# Patient Record
Sex: FEMALE | Race: OTHER | HISPANIC OR LATINO | Employment: OTHER | URBAN - METROPOLITAN AREA
[De-identification: names, ages, dates, MRNs, and addresses within clinical notes are randomized per-mention and may not be internally consistent; named-entity substitution may affect disease eponyms.]

---

## 2017-01-04 ENCOUNTER — HOSPITAL ENCOUNTER (OUTPATIENT)
Dept: CT IMAGING | Facility: HOSPITAL | Age: 62
Discharge: HOME | End: 2017-01-04
Payer: COMMERCIAL

## 2017-01-04 ENCOUNTER — GENERIC CONVERSION - ENCOUNTER (OUTPATIENT)
Dept: OTHER | Facility: OTHER | Age: 62
End: 2017-01-04

## 2017-01-04 DIAGNOSIS — R91.1 COIN LESION: ICD-10-CM

## 2017-01-04 DIAGNOSIS — R05.9 COUGH: ICD-10-CM

## 2017-01-04 PROCEDURE — 71250 CT THORAX DX C-: CPT

## 2017-01-16 ENCOUNTER — TRANSCRIBE ORDERS (OUTPATIENT)
Dept: ADMINISTRATIVE | Facility: HOSPITAL | Age: 62
End: 2017-01-16

## 2017-01-16 ENCOUNTER — ALLSCRIPTS OFFICE VISIT (OUTPATIENT)
Dept: OTHER | Facility: OTHER | Age: 62
End: 2017-01-16

## 2017-01-16 DIAGNOSIS — R05.9 COUGH: Primary | ICD-10-CM

## 2017-01-16 DIAGNOSIS — R93.89 ABNORMAL RADIOLOGICAL FINDINGS IN SKIN AND SUBCUTANEOUS TISSUE: ICD-10-CM

## 2017-01-18 ENCOUNTER — GENERIC CONVERSION - ENCOUNTER (OUTPATIENT)
Dept: OTHER | Facility: OTHER | Age: 62
End: 2017-01-18

## 2017-01-18 ENCOUNTER — HOSPITAL ENCOUNTER (OUTPATIENT)
Dept: PULMONOLOGY | Facility: HOSPITAL | Age: 62
Discharge: HOME | End: 2017-01-18
Payer: COMMERCIAL

## 2017-01-18 DIAGNOSIS — R93.89 ABNORMAL RADIOLOGICAL FINDINGS IN SKIN AND SUBCUTANEOUS TISSUE: ICD-10-CM

## 2017-01-18 DIAGNOSIS — R05.9 COUGH: ICD-10-CM

## 2017-01-18 PROCEDURE — 94729 DIFFUSING CAPACITY: CPT

## 2017-01-18 PROCEDURE — 94760 N-INVAS EAR/PLS OXIMETRY 1: CPT

## 2017-01-18 PROCEDURE — 94060 EVALUATION OF WHEEZING: CPT

## 2017-01-18 PROCEDURE — 94726 PLETHYSMOGRAPHY LUNG VOLUMES: CPT

## 2017-01-18 RX ORDER — SODIUM CHLORIDE FOR INHALATION 0.9 %
VIAL, NEBULIZER (ML) INHALATION
Status: DISCONTINUED
Start: 2017-01-18 | End: 2017-01-22 | Stop reason: HOSPADM

## 2017-01-31 ENCOUNTER — ALLSCRIPTS OFFICE VISIT (OUTPATIENT)
Dept: OTHER | Facility: OTHER | Age: 62
End: 2017-01-31

## 2017-02-03 ENCOUNTER — GENERIC CONVERSION - ENCOUNTER (OUTPATIENT)
Dept: OTHER | Facility: OTHER | Age: 62
End: 2017-02-03

## 2017-02-07 ENCOUNTER — ANESTHESIA EVENT (OUTPATIENT)
Dept: GASTROENTEROLOGY | Facility: HOSPITAL | Age: 62
End: 2017-02-07
Payer: COMMERCIAL

## 2017-02-08 ENCOUNTER — DOCUMENTATION (OUTPATIENT)
Dept: RESPIRATORY THERAPY | Facility: HOSPITAL | Age: 62
End: 2017-02-08

## 2017-02-08 ENCOUNTER — HOSPITAL ENCOUNTER (OUTPATIENT)
Facility: HOSPITAL | Age: 62
Setting detail: OUTPATIENT SURGERY
Discharge: HOME | End: 2017-02-08
Attending: INTERNAL MEDICINE | Admitting: INTERNAL MEDICINE
Payer: COMMERCIAL

## 2017-02-08 ENCOUNTER — ANESTHESIA (OUTPATIENT)
Dept: GASTROENTEROLOGY | Facility: HOSPITAL | Age: 62
End: 2017-02-08
Payer: COMMERCIAL

## 2017-02-08 VITALS
DIASTOLIC BLOOD PRESSURE: 68 MMHG | HEIGHT: 65 IN | TEMPERATURE: 97.8 F | BODY MASS INDEX: 37.15 KG/M2 | OXYGEN SATURATION: 98 % | RESPIRATION RATE: 20 BRPM | WEIGHT: 223 LBS | HEART RATE: 89 BPM | SYSTOLIC BLOOD PRESSURE: 129 MMHG

## 2017-02-08 DIAGNOSIS — R93.89 ABNORMAL CAT SCAN: ICD-10-CM

## 2017-02-08 LAB
EOSINOPHIL NFR FLD MANUAL: 1 %
LYMPHOCYTES NFR BLD AUTO: 9 %
MONO+MESO NFR FLD MANUAL: 80 %
MONOCYTES NFR BLD AUTO: 3 %
NEUTS SEG NFR BLD AUTO: 7 %
TOTAL CELLS COUNTED SPEC: 100
WBC # FLD MANUAL: 63 /UL

## 2017-02-08 PROCEDURE — 87075 CULTR BACTERIA EXCEPT BLOOD: CPT | Performed by: INTERNAL MEDICINE

## 2017-02-08 PROCEDURE — 87206 SMEAR FLUORESCENT/ACID STAI: CPT | Performed by: INTERNAL MEDICINE

## 2017-02-08 PROCEDURE — 87205 SMEAR GRAM STAIN: CPT | Performed by: INTERNAL MEDICINE

## 2017-02-08 PROCEDURE — 88112 CYTOPATH CELL ENHANCE TECH: CPT | Performed by: INTERNAL MEDICINE

## 2017-02-08 PROCEDURE — 87102 FUNGUS ISOLATION CULTURE: CPT | Performed by: INTERNAL MEDICINE

## 2017-02-08 PROCEDURE — 87116 MYCOBACTERIA CULTURE: CPT | Performed by: INTERNAL MEDICINE

## 2017-02-08 PROCEDURE — 87252 VIRUS INOCULATION TISSUE: CPT | Performed by: INTERNAL MEDICINE

## 2017-02-08 PROCEDURE — 88305 TISSUE EXAM BY PATHOLOGIST: CPT | Performed by: INTERNAL MEDICINE

## 2017-02-08 PROCEDURE — 89051 BODY FLUID CELL COUNT: CPT | Performed by: INTERNAL MEDICINE

## 2017-02-08 PROCEDURE — 87070 CULTURE OTHR SPECIMN AEROBIC: CPT | Performed by: INTERNAL MEDICINE

## 2017-02-08 RX ORDER — SODIUM CHLORIDE 9 MG/ML
20 INJECTION, SOLUTION INTRAVENOUS CONTINUOUS
Status: DISCONTINUED | OUTPATIENT
Start: 2017-02-08 | End: 2017-02-08 | Stop reason: HOSPADM

## 2017-02-08 RX ORDER — PROPOFOL 10 MG/ML
INJECTION, EMULSION INTRAVENOUS AS NEEDED
Status: DISCONTINUED | OUTPATIENT
Start: 2017-02-08 | End: 2017-02-08 | Stop reason: SURG

## 2017-02-08 RX ORDER — KETAMINE HYDROCHLORIDE 100 MG/ML
INJECTION, SOLUTION INTRAMUSCULAR; INTRAVENOUS AS NEEDED
Status: DISCONTINUED | OUTPATIENT
Start: 2017-02-08 | End: 2017-02-08 | Stop reason: SURG

## 2017-02-08 RX ORDER — LIDOCAINE HYDROCHLORIDE 10 MG/ML
INJECTION, SOLUTION INFILTRATION; PERINEURAL AS NEEDED
Status: DISCONTINUED | OUTPATIENT
Start: 2017-02-08 | End: 2017-02-08 | Stop reason: SURG

## 2017-02-08 RX ADMIN — PROPOFOL 100 MG: 10 INJECTION, EMULSION INTRAVENOUS at 13:33

## 2017-02-08 RX ADMIN — PROPOFOL 50 MG: 10 INJECTION, EMULSION INTRAVENOUS at 13:36

## 2017-02-08 RX ADMIN — PROPOFOL 50 MG: 10 INJECTION, EMULSION INTRAVENOUS at 13:42

## 2017-02-08 RX ADMIN — PROPOFOL 50 MG: 10 INJECTION, EMULSION INTRAVENOUS at 13:39

## 2017-02-08 RX ADMIN — SODIUM CHLORIDE 20 ML/HR: 0.9 INJECTION, SOLUTION INTRAVENOUS at 12:47

## 2017-02-08 RX ADMIN — KETAMINE HYDROCHLORIDE 25 MG: 100 INJECTION, SOLUTION INTRAMUSCULAR; INTRAVENOUS at 13:33

## 2017-02-08 RX ADMIN — PROPOFOL 50 MG: 10 INJECTION, EMULSION INTRAVENOUS at 13:45

## 2017-02-08 RX ADMIN — LIDOCAINE HYDROCHLORIDE 100 MG: 10 INJECTION, SOLUTION INFILTRATION; PERINEURAL at 13:33

## 2017-02-10 LAB
BACTERIA SPEC ANAEROBE CULT: NORMAL
BACTERIA SPEC BFLD CULT: NORMAL
GRAM STN SPEC: NORMAL

## 2017-02-17 LAB — VIRUS SPEC CULT: NORMAL

## 2017-03-02 DIAGNOSIS — R93.89 ABNORMAL CAT SCAN: Primary | ICD-10-CM

## 2017-03-13 LAB — FUNGUS SPEC CULT: NORMAL

## 2017-03-23 ENCOUNTER — TRANSCRIBE ORDERS (OUTPATIENT)
Dept: ADMINISTRATIVE | Facility: HOSPITAL | Age: 62
End: 2017-03-23

## 2017-03-23 ENCOUNTER — ALLSCRIPTS OFFICE VISIT (OUTPATIENT)
Dept: OTHER | Facility: OTHER | Age: 62
End: 2017-03-23

## 2017-03-23 DIAGNOSIS — J84.09 SIMPLE PULMONARY ALVEOLITIS (HCC): Primary | ICD-10-CM

## 2017-04-11 LAB
MYCOBACTERIUM SPEC CULT: ABNORMAL
MYCOBACTERIUM SPEC CULT: ABNORMAL
RHODAMINE-AURAMINE STN SPEC: ABNORMAL

## 2017-04-19 ENCOUNTER — GENERIC CONVERSION - ENCOUNTER (OUTPATIENT)
Dept: OTHER | Facility: OTHER | Age: 62
End: 2017-04-19

## 2017-06-26 DIAGNOSIS — J84.9 INTERSTITIAL PULMONARY DISEASE (HCC): ICD-10-CM

## 2018-01-11 NOTE — MISCELLANEOUS
Message  Mailed colono letter to patients home address  Tasked PCPs office  {Resulted order in [employer]  Updated pre-visit planning to reflect 10 year follow up  }       Active Problems    1  Encounter for colorectal cancer screening (V76 51) (Z12 11,Z12 12)   2  Lung nodule (793 11) (R91 1)   3  Obesity (278 00) (E66 9)   4  Psoriatic arthropathy (696 0) (L40 50)   5  Sinus infection (473 9) (J32 9)   6  Urinary tract infection (599 0) (N39 0)    Current Meds   1  Ciprofloxacin HCl - 250 MG Oral Tablet; Take 1 every 12 hours; Therapy: 25XVL0669 to (Last Rx:25Omg2895)  Requested for: 52MAJ2274 Ordered   2  Folic Acid 1 MG Oral Tablet; Therapy: (Recorded:64Cpt9907) to Recorded   3  Methotrexate 2 5 MG Oral Tablet; TAKE 6 TABLETS WEEKLY; Therapy: 00ZNU4176 to (Last Rx:30Mar2016) Ordered   4  Nystatin 174589 UNIT/GM External Cream; APPLY  AND RUB  IN A THIN FILM TO   AFFECTED AREAS TWICE DAILY  (AM AND PM) 1 tube per week for 2 weeks; Therapy: 74AFC6927 to (Last Rx:18Trw7362)  Requested for: 77Xqm3242 Ordered    Allergies    1  Aspirin TABS   2  Sulfa Drugs    Plan  Encounter for colorectal cancer screening    · COLONOSCOPY ; every 10 years;  Last 84HHN3030; Next 35Xae6568; Status:Active    Signatures   Electronically signed by : Meir Romero, ; Sep  1 2016  4:10PM EST                       (Author)

## 2018-01-12 NOTE — RESULT NOTES
Verified Results  * CT CHEST WO CONTRAST 69ZOZ9005 08:14AM Ingrid Reddynut     Test Name Result Flag Reference   CT CHEST WO CONTRAST (Report)     CT CHEST WITHOUT IV CONTRAST     INDICATION: History of lung nodule  Cough and chest tightness  Former smoker  COMPARISON: CT chest 5/5/2015     TECHNIQUE: CT examination of the chest was performed without intravenous contrast  Axial, sagittal and coronal reformatted images were submitted for interpretation  Coronal thick section MIP (maximal intensity projection) images were also created  This examination, like all CT scans performed in the [facility], was performed utilizing techniques to minimize radiation dose exposure, including the use of iterative reconstruction and automated exposure control  FINDINGS:     LUNGS: Mild progression of peripheral interlobular septal thickening with areas of peripheral groundglass and alveolar consolidation particularly in the right lung  Previously seen 2 mm left upper lobe nodule is no longer identified  No    endotracheal/endobronchial lesion  PLEURA: Unremarkable  HEART/GREAT VESSELS: Unremarkable for patient's age  MEDIASTINUM AND JAIME: Unremarkable  CHEST WALL AND LOWER NECK: Unremarkable  VISUALIZED STRUCTURES IN THE UPPER ABDOMEN: Unremarkable  OSSEOUS STRUCTURES: No acute fracture  No destructive osseous lesion  IMPRESSION:     Mild progression of peripheral interlobular septal thickening with areas of peripheral groundglass and alveolar consolidation particularly in the right lung  Findings are nonspecific but could be seen in the setting of chronic infection or BOOP  Previously seen 2 mm left upper lobe nodule is no longer identified  ##sigslh##sigslh       Workstation performed: [id number]     Signed by:    Carlos Gusman MD   1/4/17       Plan  Chronic cough, Lung nodule    · *1 - [facility] Physician Referral  Consult Only: the expectation is that  the referring provider will communicate back to the patient on treatment options  Evaluation and Treatment: the expectation is that the referred to provider will  communicate back to the patient on treatment options    Status: Active  Requested for:  84VDW3600  Care Summary provided  : Yes    Discussion/Summary   ct shows progression of lung process, want her  to see a lung specialist, look for  a slip

## 2018-01-12 NOTE — PROGRESS NOTES
Assessment    1  Encounter for preventive health examination (V70 0) (Z00 00)   2  Psoriatic arthropathy (696 0) (L40 50)   3  Obesity (278 00) (E66 9)   4  Encounter for screening mammogram for malignant neoplasm of breast (V76 12)   (Z12 31)   5  Screening for malignant neoplasm of cervix (V76 2) (Z12 4)   6  Encounter for colorectal cancer screening (V76 51) (Z12 11,Z12 12)    Plan  Encounter for colorectal cancer screening, Screening for malignant neoplasm of cervix    · 1 - Ingrid Monsivais MD  (General Surgery) Physician Referral  Consult  Status: Active   Requested for: 98AVN4913  Care Summary provided  : Yes  Health Maintenance    · *VB-Depression Screening; Status:Resulted - Requires Verification;   Done: 70AUO9602  11:20AM   · Follow-up visit in 1 year Evaluation and Treatment  Follow-up  Status: Hold For -  Scheduling  Requested for: 25EBD8048   · Alcohol misuse can be a problem with advancing age ; Status:Complete;   Done:  28WHE3781 11:21AM   · Always use a seat belt and shoulder strap when riding or driving a motor vehicle ;  Status:Complete;   Done: 89CEC9046 11:21AM   · Brush your teeth freq1 and floss at least once a day ; Status:Complete;   Done:  14HUR5707 11:21AM   · Decreasing the stress in your life may help your condition improve ; Status:Complete;    Done: 63TYU3563 11:21AM   · Stretch and warm up your muscles during the first 10 minutes , then cool down your  muscles for the last 10 minutes of exercise ; Status:Complete;   Done: 20VGW1850  11:21AM   · Use a sun block product with an SPF of 15 or more ; Status:Complete;   Done:  10PSV1353 11:21AM   · We recommend a colonoscopy ; Status:Complete;   Done: 38XRJ4684 11:21AM   · We recommend routine visits to a dentist ; Status:Complete;   Done: 21EAM6905 11:21AM   · Call [phone] if: You find a new or different kind of lump in your breast ;  Status:Complete;   Done: 33AIS4506 11:21AM   · Call [phone] if:  You have any bleeding from the vagina ; Status:Complete;    Done: 39OVB5585 11:21AM   · Call [phone] if: You have any warning signs of skin cancer ; Status:Complete;    Done: 23ELR2956 11:21AM   · Call 911 if: You experience a new kind of chest pain (angina) or pressure ;  Status:Complete;   Done: 54NYG8403 11:21AM   · [place] - Padmini Alexandra MD, Carolina Garibay  (Obstetrics/Gynecology) Physician Referral  Consult  Status:  Hold For - Scheduling  Requested for: 28XKH3609  Care Summary provided  : Yes   · Stop: Fluzone Quadrivalent Intramuscular Suspension   · Tdap; INJECT 0 5  ML Intramuscular; To Be Done: 54WDM8855   · Zoster (Zostavax); INJECT 0 5  ML Intramuscular; To Be Done: 48CWV2997  Health Maintenance, Encounter for screening mammogram for malignant neoplasm of  breast    · MAMMO SCREENING BILATERAL W 3D & CAD; Status:Hold For - Scheduling; Requested  for:30Mar2016; Health Maintenance, Obesity    · Diets that are low in carbohydrates and high in protein are very popular for weight loss ;  Status:Complete;   Done: 97YWQ7917 11:21AM   · Eat a low fat and low cholesterol diet ; Status:Complete;   Done: 35OQU5144 11:21AM  Obesity    · Call [phone] if: You are considering suicide ; Status:Complete;   Done:  23DAS2850 11:22AM   · Call [phone] if: You are having difficulty sleeping (insomnia) ; Status:Complete;    Done: 31SZG3768 11:22AM   · Call [phone] if: You are urinating too frequently ; Status:Complete;   Done:  74UWV6559 11:22AM   · Call [phone] if: You feel thirsty most of the time ; Status:Complete;   Done:  36QQE9456 11:22AM   · Call [phone] if: You feel your heart is beating very fast or skipping beats ;  Status:Complete;   Done: 26VIP7841 11:22AM   · Call [phone] if: You have feelings of extreme sadness and feelings of  hopelessness ; Status:Complete;   Done: 23KKP8581 11:22AM   · Call [phone] if:  You have pain in your abdomen ; Status:Complete;   Done:  29TNR8638 11:22AM   · Call [phone] if: You have symptoms of sleep apnea ; Status:Complete;   Done:  87UFE0361 11:22AM   · Call 911 if: You have sudden or severe chest pain with shortness of breath, rapid  breathing, or cough ; Status:Complete;   Done: 13BVR7735 11:22AM   · Begin or continue regular aerobic exercise  Gradually work up to at least 3 sessions of 30  minutes of exercise a week ; Status:Complete;   Done: 67GZG9059 11:22AM   · Some eating tips that can help you lose weight ; Status:Complete;   Done: 41CHS7463  11:22AM   · Stretch and warm up your muscles during the first 10 minutes , then cool down your  muscles for the last 10 minutes of exercise ; Status:Complete;   Done: 80BXX6923  11:22AM  Psoriatic arthropathy    · Methotrexate 2 5 MG Oral Tablet; TAKE 6 TABLETS WEEKLY    Discussion/Summary  health maintenance visit Currently, she eats a healthy diet and has an inadequate exercise regimen  the risks and benefits of cervical cancer screening were discussed cervical cancer screening is managed by Dr Jules Muñoz referred back Breast cancer screening: the risks and benefits of breast cancer screening were discussed, self breast exam technique was taught, monthly self breast exam was advised and mammogram has been ordered  Colorectal cancer screening: the risks and benefits of colorectal cancer screening were discussed and colonoscopy has been ordered  Osteoporosis screening: the risks and benefits of osteoporosis screening were discussed  The immunizations will be given as outlined in the orders  She was advised to be evaluated by an optometrist  Advice and education were given regarding aerobic exercise, calcium supplements, vitamin D supplements, cardiovascular risk reduction, self skin examination, helmet use and seat belt use  Patient discussion: discussed with the patient  Chief Complaint  CK UP  REVIEW BLOOD WORK      History of Present Illness  HM, Adult Female:  The patient is being seen for a health maintenance evaluation  The last health maintenance visit was 2 year(s) ago  Social History: Household members include spouse  She is   Work status: homemaker  The patient has never smoked cigarettes  She reports never drinking alcohol  She has never used illicit drugs  General Health: The patient's health since the last visit is described as good  She has regular dental visits  She denies vision problems  She denies hearing loss  Immunizations status: not up to date  Lifestyle:  She consumes a diverse and healthy diet  She has weight concerns  She does not exercise regularly  She does not use tobacco  She denies alcohol use  She denies drug use  Reproductive health: the patient is postmenopausal   she is sexually active  Screening: Cervical cancer screening includes a pap smear performed 3 yrs ago  Breast cancer screening includes a mammogram performed 2 yrs ago, a clinical breast exam performed 3 yrs ago and monthly breast self-exams performed  She hasn't been previously screened for colorectal cancer  Metabolic screening includes lipid profile performed 2 weeks ago, glucose screening performed 2 weeks ago, thyroid function test performed 2 weeks ago and no previous DEXA  Cardiovascular risk factors: obesity and family history of cardiovascular disease, but no hypertension, no diabetes, no high LDL cholesterol, no low HDL cholesterol, no stress, no tobacco use, no illicit drug use and no sedentary lifestyle  General health risks: no previous breast cancer, no abnormal cervical cytology, no positive screening for human papilloma virus, no hormone therapy, no diethylstilbestrol (NATE) exposure in utero, no previous colon polyps, no inflammatory bowel disease and no osteoporosis risk factors  Safety elements used: seat belt, safe driving habits, sunscreen and smoke detector, but no carbon monoxide detector     HPI: Patient is here for well checkup Patient is overall doing OK Patient is not up to date with immunizations and needs screening tests patient last labs were revieewed      Review of Systems    Constitutional: no fever, not feeling poorly, no chills and not feeling tired  Eyes: no eye pain and no eyesight problems  ENT: no complaints of earache, no loss of hearing, no nose bleeds, no nasal discharge, no sore throat, no hoarseness  Cardiovascular: no chest pain, no intermittent leg claudication, no palpitations and no lower extremity edema  Respiratory: No complaints of shortness of breath, no wheezing, no cough, no SOB on exertion, no orthopnea, no PND  Gastrointestinal: No complaints of abdominal pain, no constipation, no nausea or vomiting, no diarrhea, no bloody stools  Genitourinary: no dysuria and no incontinence  Musculoskeletal: no arthralgias and no myalgias  Integumentary: no rashes  Neurological: No complaints of headache, no confusion, no convulsions, no numbness, no dizziness or fainting, no tingling, no limb weakness, no difficulty walking  Psychiatric: Not suicidal, no sleep disturbance, no anxiety or depression, no change in personality, no emotional problems  Active Problems    1  Lung nodule (793 11) (R91 1)   2   Obesity (278 00) (E66 9)    Past Medical History    · History of Acute upper respiratory infection (465 9) (J06 9)   · History of Allergic rhinitis (477 9) (J30 9)   · History of Bilateral knee pain (719 46) (M25 561,M25 562)   · History of Candidiasis of breast (112 89) (B37 89)   · History of Community acquired pneumonia (486) (J18 9)   · History of abnormal weight loss (V13 89) (N99 361)   · History of atopic dermatitis (V13 3) (Z87 2)   · History of diarrhea (V12 79) (Z87 898)   · History of pneumonia (V12 61) (Z87 01)   · History of sinusitis (V12 69) (Z87 09)   · History of Pain in right foot (729 5) (M79 671)   · History of Synovitis Of Ankle/Foot (727 06)   · Urinary tract infection (599 0) (N39 0)    Surgical History    · History of Complete Colonoscopy   · History of Hernia Repair    Family History    · Family history of Hypertension (V17 49)   · Family history of Hypothyroidism    · Family history of Breast Cancer (V16 3)    · Family history of Arthritis (V17 7)   · Denied: Family history of Osteoporosis    Social History    · Denied: History of Alcohol Use (History)   · Caffeine Use   · Denied: History of Drug Use   · Never A Smoker   · Denied: History of Tobacco Use   · Uses Safety Equipment - Seatbelts    Current Meds   1  Nystatin 368142 UNIT/GM External Cream; APPLY  AND RUB  IN A THIN FILM TO   AFFECTED AREAS TWICE DAILY  (AM AND PM) 1 tube per week for 2 weeks; Therapy: 46CHO7908 to (Last Rx:53Oqf5293)  Requested for: 27Jan4122 Ordered    Allergies    1  Aspirin TABS   2  Sulfa Drugs    Vitals   Recorded: 78QVV0289 26:26HA   Systolic 710   Diastolic 78   Height 5 ft 6 in   Weight 220 lb 2 oz   BMI Calculated 35 53   BSA Calculated 2 08     Physical Exam    Constitutional   General appearance: No acute distress, well appearing and well nourished  Eyes   Conjunctiva and lids: No swelling, erythema or discharge  Pupils and irises: Equal, round, reactive to light  Ears, Nose, Mouth, and Throat   External inspection of ears and nose: Normal     Otoscopic examination: Tympanic membranes translucent with normal light reflex  Canals patent without erythema  Hearing: Normal     Nasal mucosa, septum, and turbinates: Normal without edema or erythema  Lips, teeth, and gums: Normal, good dentition  Oropharynx: Normal with no erythema, edema, exudate or lesions  Neck   Neck: Supple, symmetric, trachea midline, no masses  Thyroid: Normal, no thyromegaly  Pulmonary   Respiratory effort: No increased work of breathing or signs of respiratory distress  Auscultation of lungs: Clear to auscultation  Cardiovascular   Auscultation of heart: Normal rate and rhythm, normal S1 and S2, no murmurs  Carotid pulses: 2+ bilaterally  Abdominal aorta: Normal     Examination of extremities for edema and/or varicosities: Normal     Abdomen   Abdomen: Non-tender, no masses  Liver and spleen: No hepatomegaly or splenomegaly  Lymphatic   Palpation of lymph nodes in neck: No lymphadenopathy  Musculoskeletal   Gait and station: Normal     Skin   Skin and subcutaneous tissue: Normal without rashes or lesions  Palpation of skin and subcutaneous tissue: Normal turgor  Neurologic   Cranial nerves: Cranial nerves II-XII intact  Psychiatric   Judgment and insight: Normal     Orientation to person, place, and time: Normal     Recent and remote memory: Intact      Mood and affect: Normal        Signatures   Electronically signed by : Juan Antonio Almaraz DO; Mar 30 2016 11:25AM EST                       (Author)

## 2018-01-13 VITALS
SYSTOLIC BLOOD PRESSURE: 124 MMHG | WEIGHT: 222 LBS | DIASTOLIC BLOOD PRESSURE: 78 MMHG | BODY MASS INDEX: 36.94 KG/M2 | TEMPERATURE: 98.7 F

## 2018-01-14 VITALS
HEIGHT: 64 IN | BODY MASS INDEX: 37.41 KG/M2 | OXYGEN SATURATION: 99 % | RESPIRATION RATE: 16 BRPM | DIASTOLIC BLOOD PRESSURE: 80 MMHG | HEART RATE: 76 BPM | SYSTOLIC BLOOD PRESSURE: 126 MMHG | WEIGHT: 219.13 LBS | TEMPERATURE: 96.3 F

## 2018-01-14 VITALS
HEIGHT: 64 IN | OXYGEN SATURATION: 100 % | TEMPERATURE: 99.5 F | WEIGHT: 223.5 LBS | BODY MASS INDEX: 38.16 KG/M2 | RESPIRATION RATE: 18 BRPM | DIASTOLIC BLOOD PRESSURE: 58 MMHG | SYSTOLIC BLOOD PRESSURE: 116 MMHG | HEART RATE: 113 BPM

## 2018-01-17 NOTE — MISCELLANEOUS
Message   Date: 19 Apr 2017 8:32 AM EST, Recorded By: Shay Briceño For: Jacquline Cabot: [place] Beyer, Self   Phone: [phone] (Home)   Reason: Medical Record Request   Mina Vigil called the office on 4/18/17, requesting that we send pulmonary records to her rheumatologist    i faxed 2 office notes, a spirogram and 6MWT to Dr Edison Case thru the [employer] program today  fax [phone]        Active Problems    1  Abnormal chest CT (793 2) (R93 8)   2  Abnormal PFTs (794 2) (R94 2)   3  Chronic cough (786 2) (R05)   4  ILD (interstitial lung disease) (515) (J84 9)   5  Lung nodule (793 11) (R91 1)   6  Obesity (278 00) (E66 9)   7  Psoriatic arthropathy (696 0) (L40 50)    Current Meds   1  ProAir  (90 Base) MCG/ACT Inhalation Aerosol Solution; 2 puffs 4 times per day   as needed; Therapy: 16Cxo7700 to (Last Rx:87Vyt9852)  Requested for: 87Cdo3832 Ordered    Allergies    1  Aspirin TABS   2   Sulfa Drugs    Signatures   Electronically signed by : Moncho Hanson, ; Apr 19 2017  8:35AM EST                       (Author)

## 2018-01-18 NOTE — MISCELLANEOUS
Message   Date: 17 Aug 2016 8:07 AM EST, Recorded By: Rosalind Khan For: [facility],Team   Routine post colonoscopy call placed to patient  Procedure done 8/16/16  Patient answered and states she is doing well  Informed her that her next colonoscopy will be due in 10 years and she will receive a letter in the mail shortly with the reminder but she will need to remember to call in 10 years to schedule an appointment  Patient verbalized understand and had no questions or concerns  Active Problems    1  Encounter for colorectal cancer screening (V76 51) (Z12 11,Z12 12)   2  Lung nodule (793 11) (R91 1)   3  Obesity (278 00) (E66 9)   4  Psoriatic arthropathy (696 0) (L40 50)   5  Sinus infection (473 9) (J32 9)   6  Urinary tract infection (599 0) (N39 0)    Current Meds   1  Ciprofloxacin HCl - 250 MG Oral Tablet; Take 1 every 12 hours; Therapy: 80JHL9977 to (Last Rx:09Wbf7754)  Requested for: 89HKD6640 Ordered   2  Folic Acid 1 MG Oral Tablet; Therapy: (Recorded:19Bzk5710) to Recorded   3  Methotrexate 2 5 MG Oral Tablet; TAKE 6 TABLETS WEEKLY; Therapy: 44SOC5588 to (Last Rx:30Mar2016) Ordered   4  Nystatin 907229 UNIT/GM External Cream; APPLY  AND RUB  IN A THIN FILM TO   AFFECTED AREAS TWICE DAILY  (AM AND PM) 1 tube per week for 2 weeks; Therapy: 20AWF9691 to (Last Rx:62Squ8772)  Requested for: 55Lsg2989 Ordered    Allergies    1  Aspirin TABS   2   Sulfa Drugs    Signatures   Electronically signed by : Thelma Solorzano, ; Aug 17 2016  8:09AM EST                       (Author)

## 2018-03-07 NOTE — PROGRESS NOTES
History of Present Illness    Revaccination   Vaccine Information: Vaccine(s) Given (names): ZOSTER  Vaccine(s) Given (names): PNEUMOVAX  Spoke with patient regarding vaccine out of temperature range  Action(s): Revaccination declined  Pt called (attempt 1): [id number] 9036 BL  Revaccination Completed: not immunization  Active Problems    1  Abnormal chest CT (793 2) (R93 8)   2  Abnormal PFTs (794 2) (R94 2)   3  Chronic cough (786 2) (R05)   4  Lung nodule (793 11) (R91 1)   5  Obesity (278 00) (E66 9)   6  Psoriatic arthropathy (696 0) (L40 50)    Immunizations  Influenza --- Lila De Guzman: Temporarily Deferred: Pt refuses   PPSV --- Lila De Guzman: Hold For Documentation, 11-May-2015   Tdap --- Lila De Guzman: 30-Mar-2016   Zoster --- Series1: 30-Mar-2016     Current Meds   1  Fluticasone Propionate 50 MCG/ACT Nasal Suspension; USE 2 SPRAYS IN EACH   NOSTRIL ONCE DAILY   2  Folic Acid 1 MG Oral Tablet   3  Methotrexate 2 5 MG Oral Tablet; TAKE 6 TABLETS WEEKLY   4  ProAir  (90 Base) MCG/ACT Inhalation Aerosol Solution; 2 puffs 4 times per day   as needed    Allergies    1  Aspirin TABS   2   Sulfa Drugs    Future Appointments    Date/Time Provider Specialty Site   03/23/2017 02:00 PM Desi Rush MD Pulmonary Medicine [facility]     Signatures   Electronically signed by : Amaury Ruiz DO; Feb  3 2017 11:12AM EST                       (Author)

## 2018-05-08 ENCOUNTER — OFFICE VISIT (OUTPATIENT)
Dept: FAMILY MEDICINE CLINIC | Facility: CLINIC | Age: 63
End: 2018-05-08
Payer: COMMERCIAL

## 2018-05-08 ENCOUNTER — HOSPITAL ENCOUNTER (OUTPATIENT)
Dept: RADIOLOGY | Facility: HOSPITAL | Age: 63
Discharge: HOME | End: 2018-05-08
Payer: COMMERCIAL

## 2018-05-08 VITALS
DIASTOLIC BLOOD PRESSURE: 78 MMHG | SYSTOLIC BLOOD PRESSURE: 124 MMHG | TEMPERATURE: 98.6 F | HEIGHT: 65 IN | WEIGHT: 211.2 LBS | BODY MASS INDEX: 35.19 KG/M2

## 2018-05-08 DIAGNOSIS — J40 BRONCHITIS: ICD-10-CM

## 2018-05-08 DIAGNOSIS — R94.2 ABNORMAL PFTS: ICD-10-CM

## 2018-05-08 DIAGNOSIS — J84.9 ILD (INTERSTITIAL LUNG DISEASE) (HCC): ICD-10-CM

## 2018-05-08 DIAGNOSIS — J84.9 ILD (INTERSTITIAL LUNG DISEASE) (HCC): Primary | ICD-10-CM

## 2018-05-08 PROBLEM — J84.89 BOOP (BRONCHIOLITIS OBLITERANS WITH ORGANIZING PNEUMONIA) (HCC): Status: ACTIVE | Noted: 2017-04-05

## 2018-05-08 PROBLEM — M35.00 SJOGREN'S SYNDROME (HCC): Status: ACTIVE | Noted: 2017-04-20

## 2018-05-08 PROBLEM — R76.8 ANA POSITIVE: Status: ACTIVE | Noted: 2017-08-03

## 2018-05-08 PROBLEM — R93.89 ABNORMAL CHEST CT: Status: ACTIVE | Noted: 2017-01-04

## 2018-05-08 PROCEDURE — 99214 OFFICE O/P EST MOD 30 MIN: CPT | Performed by: FAMILY MEDICINE

## 2018-05-08 PROCEDURE — 71046 X-RAY EXAM CHEST 2 VIEWS: CPT

## 2018-05-08 RX ORDER — LEVOFLOXACIN 500 MG/1
500 TABLET, FILM COATED ORAL EVERY 24 HOURS
Qty: 10 TABLET | Refills: 0 | Status: SHIPPED | OUTPATIENT
Start: 2018-05-08 | End: 2018-05-18

## 2018-05-08 RX ORDER — MAGNESIUM OXIDE/MAG AA CHELATE 133 MG
133 TABLET ORAL
COMMUNITY

## 2018-05-08 RX ORDER — ALBUTEROL SULFATE 90 UG/1
2 AEROSOL, METERED RESPIRATORY (INHALATION) 4 TIMES DAILY PRN
COMMUNITY
Start: 2016-12-22

## 2018-05-08 RX ORDER — BROMPHENIRAMINE MALEATE, PSEUDOEPHEDRINE HYDROCHLORIDE, AND DEXTROMETHORPHAN HYDROBROMIDE 2; 30; 10 MG/5ML; MG/5ML; MG/5ML
5 SYRUP ORAL 4 TIMES DAILY PRN
Qty: 240 ML | Refills: 0 | Status: SHIPPED | OUTPATIENT
Start: 2018-05-08 | End: 2018-11-06 | Stop reason: SDUPTHER

## 2018-05-08 RX ORDER — MONTELUKAST SODIUM 10 MG/1
10 TABLET ORAL
Qty: 90 TABLET | Refills: 0 | Status: SHIPPED | OUTPATIENT
Start: 2018-05-08

## 2018-05-08 RX ORDER — NICOTINE POLACRILEX 2 MG
GUM BUCCAL
COMMUNITY

## 2018-05-08 NOTE — PROGRESS NOTES
Assessment/Plan:         Diagnoses and all orders for this visit:    ILD (interstitial lung disease) ([facility] [address] )  -     XR chest pa & lateral; Future  -     Complete pulmonary function test; Future    Abnormal PFTs  -     Complete pulmonary function test; Future    Bronchitis  -     levofloxacin (LEVAQUIN) 500 mg tablet; Take 1 tablet (500 mg total) by mouth every 24 hours for 10 days  -     brompheniramine-pseudoephedrine-DM 30-2-10 MG/5ML syrup; Take 5 mL by mouth 4 (four) times a day as needed for congestion or cough  -     XR chest pa & lateral; Future  -     montelukast (SINGULAIR) 10 mg tablet; Take 1 tablet (10 mg total) by mouth daily at bedtime    Other orders  -     Biotin 1 MG CAPS; Take by mouth  -     Specialty Vitamins Products (MAGNESIUM, AMINO ACID CHELATE,) 133 MG tablet; Take 133 mg by mouth  -     albuterol (PROAIR HFA) 90 mcg/act inhaler; Inhale 2 puffs 4 (four) times a day as needed          Subjective:   Chief Complaint   Patient presents with    Cough    Nasal Congestion    Earache     x  1 wk           Patient ID: Lonnie Plata is a 57 y o  female  56 yo female c/o productive cough , sinus congetion, dyspnea fatigue for ave a week  Also , has had a chronic dry cough for over a year  Last year she was diagnose [facility] interstitial lung disease, thought to be from methotrexate  This was discontinued, and she initially improved, was suppose to follow up with a chest xray, pft's and repeat eval  With pulmonary  Unfortunately, she failed to do so  The following portions of the patient's history were reviewed and updated as appropriate: allergies, current medications, past family history, past medical history, past social history, past surgical history and problem list     Review of Systems   Constitutional: Positive for activity change, appetite change, chills and fatigue  Negative for diaphoresis, fever and unexpected weight change  HENT: Positive for postnasal drip and rhinorrhea  Negative for congestion, ear pain, hearing loss, nosebleeds, sinus pressure, sore throat and tinnitus  Eyes: Negative for photophobia, pain, discharge, redness and visual disturbance  Respiratory: Positive for cough, chest tightness, shortness of breath and wheezing  Cardiovascular: Negative for chest pain, palpitations and leg swelling  Denies DUNN   Gastrointestinal: Negative for abdominal pain, blood in stool, constipation, diarrhea, nausea and vomiting  Endocrine: Negative  Genitourinary: Negative for difficulty urinating, dysuria, frequency, hematuria and urgency  Denies dysmenorrhea, menstrual difficulties   Musculoskeletal: Negative for arthralgias, joint swelling and myalgias  Skin: Negative for rash and wound  Denies skin lesions, change in birthmarks   Allergic/Immunologic: Negative for environmental allergies, food allergies and immunocompromised state  Neurological: Negative for dizziness, tremors, seizures, syncope, weakness, numbness and headaches  Hematological: Negative  Psychiatric/Behavioral: Negative for behavioral problems, confusion, decreased concentration and sleep disturbance  The patient is not nervous/anxious  Objective:      /78   Temp 98 6 °F (37 °C)   Ht 5' 5" (1 651 m)   Wt 95 8 kg (211 lb 3 2 oz)   BMI 35 15 kg/m²          Physical Exam   Constitutional: She is oriented to person, place, and time  She appears well-developed and well-nourished  No distress  Obese   HENT:   Head: Normocephalic and atraumatic  Right Ear: External ear normal    Left Ear: External ear normal    Nose: Nose normal    Mouth/Throat: Oropharynx is clear and moist    Eyes: Conjunctivae and EOM are normal  Pupils are equal, round, and reactive to light  Neck: Normal range of motion  Neck supple  No JVD present  No tracheal deviation present  No thyromegaly present  Cardiovascular: Normal rate, regular rhythm and normal heart sounds      No murmur heard   Pulmonary/Chest: Effort normal  She has decreased breath sounds in the left upper field, the left middle field and the left lower field  She has wheezes in the right upper field, the right middle field, the right lower field, the left upper field, the left middle field and the left lower field  She has no rales  Abdominal: Soft  Bowel sounds are normal  She exhibits no mass  There is no tenderness  There is no rebound and no guarding  Musculoskeletal: Normal range of motion  She exhibits no edema or tenderness  Lymphadenopathy:     She has no cervical adenopathy  Neurological: She is alert and oriented to person, place, and time  She has normal reflexes  No cranial nerve deficit  Skin: Skin is warm and dry  No lesion and no rash noted  Psychiatric: She has a normal mood and affect  Judgment normal    Nursing note and vitals reviewed

## 2018-05-09 NOTE — PATIENT INSTRUCTIONS
Patient is overdue for workup, will treat for an acute bronchitis get her back on some other medications and follow-up with studies

## 2018-05-10 ENCOUNTER — TELEPHONE (OUTPATIENT)
Dept: FAMILY MEDICINE CLINIC | Facility: CLINIC | Age: 63
End: 2018-05-10

## 2018-05-10 DIAGNOSIS — J40 BRONCHITIS: Primary | ICD-10-CM

## 2018-05-10 RX ORDER — CEFUROXIME AXETIL 500 MG/1
500 TABLET ORAL EVERY 12 HOURS SCHEDULED
Qty: 20 TABLET | Refills: 0 | Status: SHIPPED | OUTPATIENT
Start: 2018-05-10 | End: 2018-05-20

## 2018-05-11 ENCOUNTER — TELEPHONE (OUTPATIENT)
Dept: FAMILY MEDICINE CLINIC | Facility: CLINIC | Age: 63
End: 2018-05-11

## 2018-06-04 RX ORDER — ALBUTEROL SULFATE 2.5 MG/3ML
2.5 SOLUTION RESPIRATORY (INHALATION) ONCE AS NEEDED
Status: CANCELLED | OUTPATIENT
Start: 2018-06-04

## 2018-06-05 ENCOUNTER — HOSPITAL ENCOUNTER (OUTPATIENT)
Dept: PULMONOLOGY | Facility: HOSPITAL | Age: 63
Discharge: HOME | End: 2018-06-05

## 2018-08-02 ENCOUNTER — TRANSITIONAL CARE MANAGEMENT (OUTPATIENT)
Dept: FAMILY MEDICINE CLINIC | Facility: CLINIC | Age: 63
End: 2018-08-02

## 2018-08-10 ENCOUNTER — OFFICE VISIT (OUTPATIENT)
Dept: FAMILY MEDICINE CLINIC | Facility: CLINIC | Age: 63
End: 2018-08-10

## 2018-08-10 VITALS
TEMPERATURE: 95.2 F | SYSTOLIC BLOOD PRESSURE: 104 MMHG | OXYGEN SATURATION: 95 % | HEIGHT: 65 IN | WEIGHT: 200 LBS | BODY MASS INDEX: 33.32 KG/M2 | DIASTOLIC BLOOD PRESSURE: 70 MMHG

## 2018-08-10 DIAGNOSIS — M35.00 SJOGREN'S SYNDROME, WITH UNSPECIFIED ORGAN INVOLVEMENT (HCC): ICD-10-CM

## 2018-08-10 DIAGNOSIS — L40.50 PSORIATIC ARTHRITIS (HCC): ICD-10-CM

## 2018-08-10 DIAGNOSIS — J84.89 BOOP (BRONCHIOLITIS OBLITERANS WITH ORGANIZING PNEUMONIA) (HCC): Primary | ICD-10-CM

## 2018-08-10 DIAGNOSIS — J84.9 ILD (INTERSTITIAL LUNG DISEASE) (HCC): ICD-10-CM

## 2018-08-10 PROCEDURE — 99495 TRANSJ CARE MGMT MOD F2F 14D: CPT | Performed by: FAMILY MEDICINE

## 2018-08-10 NOTE — ASSESSMENT & PLAN NOTE
Biopsies pending, pulmonary considering using an immune modulator if the Sjogren's is found to be in her lungs

## 2018-08-10 NOTE — ASSESSMENT & PLAN NOTE
Follow-up with Rheumatology  Rheumatology in pulmonary are in contact with each other, there may use a joint immune modulator to help with both of her inflammatory issues

## 2018-08-10 NOTE — ASSESSMENT & PLAN NOTE
Bronchoscopic cultures were negative  Biopsy is pending  Patient has follow-up scheduled with Pulmonary  They are thinking about using some type of immune modulator for her, follow up as scheduled  I will see her in 3 months to see how she is doing with all this

## 2018-08-10 NOTE — PROGRESS NOTES
Assessment/Plan:     BO[facility] ([facility]olitis obliterans with organizing pneumonia) [facility] )  Bronchoscopic cultures were negative  Biopsy is pending  Patient has follow-up scheduled with Pulmonary  They are thinking about using some type of immune modulator for her, follow up as scheduled  I will see her in 3 months to see how she is doing with all this  ILD (interstitial lung disease) ([facility] )  Follow-up with Pulmonary as scheduled  Psoriatic arthritis ([facility] )  Follow-up with Rheumatology  Rheumatology in pulmonary are in contact with each other, there may use a joint immune modulator to help with both of her inflammatory issues  Sjogren's syndrome ([facility] )  Biopsies pending, pulmonary considering using an immune modulator if the Sjogren's is found to be in her lungs  Diagnoses and all orders for this visit:    BOOP (bronchiolitis obliterans with organizing pneumonia) ([facility] )    ILD (interstitial lung disease) ([facility] )    Sjogren's syndrome, with unspecified organ involvement ([facility] )    Psoriatic arthritis ([facility] )         Subjective:  No chief complaint on file  Patient ID: Barry Rehman is a 57 y o  female  Patient is a 68-year-old female seen in follow-up after recent hospitalization at [facility] for BOOP  She has had issues with chronic cough, and got profoundly short of breath and had some chest discomfort  She was admitted and evaluated, seen by multiple specialist   Biopsy from her bronchoscopy is still pending however the cultures were negative  Review of Systems   Constitutional: Positive for activity change and fatigue  Negative for chills, diaphoresis, fever and unexpected weight change  HENT: Negative for congestion, ear pain, hearing loss, nosebleeds, postnasal drip, rhinorrhea, sinus pressure, sore throat and tinnitus  Eyes: Negative for photophobia, pain, discharge, redness and visual disturbance     Respiratory: Negative for cough (Improved), chest tightness, shortness of breath (Improved) and wheezing  Cardiovascular: Negative for chest pain, palpitations and leg swelling  Denies DUNN   Gastrointestinal: Negative for abdominal pain, blood in stool, constipation, diarrhea, nausea and vomiting  Endocrine: Negative  Genitourinary: Negative for difficulty urinating, dysuria, frequency, hematuria and urgency  Denies dysmenorrhea, menstrual difficulties   Musculoskeletal: Positive for arthralgias and joint swelling  Negative for myalgias  Skin: Negative for rash and wound  Denies skin lesions, change in birthmarks   Allergic/Immunologic: Negative for environmental allergies, food allergies and immunocompromised state  Neurological: Negative for dizziness, tremors, seizures, syncope, weakness, numbness and headaches  Hematological: Negative  Psychiatric/Behavioral: Negative for behavioral problems, confusion, decreased concentration and sleep disturbance  The patient is not nervous/anxious  Objective:     Physical Exam   Constitutional: She is oriented to person, place, and time  She appears well-developed and well-nourished  No distress  HENT:   Head: Normocephalic and atraumatic  Right Ear: External ear normal    Left Ear: External ear normal    Nose: Nose normal    Mouth/Throat: Oropharynx is clear and moist    Eyes: Conjunctivae and EOM are normal  Pupils are equal, round, and reactive to light  Neck: Normal range of motion  Neck supple  No JVD present  No tracheal deviation present  No thyromegaly present  Cardiovascular: Normal rate, regular rhythm and normal heart sounds  No murmur heard  Pulmonary/Chest: Effort normal and breath sounds normal  She has no wheezes  She has no rales  Abdominal: Soft  Bowel sounds are normal  She exhibits no mass  There is no tenderness  There is no rebound and no guarding  Musculoskeletal: Normal range of motion  She exhibits no edema or tenderness     Arthritic changes of the hands and knees  Lymphadenopathy:     She has no cervical adenopathy  Neurological: She is alert and oriented to person, place, and time  She has normal reflexes  No cranial nerve deficit  Skin: Skin is warm and dry  No lesion and no rash noted  Psychiatric: She has a normal mood and affect  Judgment normal    Nursing note and vitals reviewed  Vitals:    08/10/18 0955   BP: 104/70   Temp: (!) 95 2 °F (35 1 °C)   Weight: 90 7 kg (200 lb)   Height: 5' 5" (1 651 m)       Transitional Care Management Review:  Shaquille Rice is a 57 y o  female here for TCM follow up       During the TCM phone call patient stated:    Date and time hospital follow up call was made:  8/2/2018  3:17 PM  Hospital care reviewed:  Records reviewed  Patient was hopsitalized at:  [facility]  Date of admission:  7/29/18  Date of discharge:  8/2/18  Diagnosis:  CHEST PAIN, DYSPNEA  Disposition:  Home  Were the patients medicaitons reviewed and updated:  No  Current symptoms:  None  Post hospital issues:  None  Should patient be enrolled in anticoag monitoring?:  No  Scheduled for follow up?:  Yes  Did you obtain your prescribed medications:  Yes  Do you need help managing your perscriptions or medications:  No  Is transportation to your appointments needed:  No  I have advised the patient to call PCP with any new or worsening symptoms (please type in name along with any credentials):  Jonelle Britt,

## 2018-11-05 ENCOUNTER — OFFICE VISIT (OUTPATIENT)
Dept: FAMILY MEDICINE CLINIC | Facility: CLINIC | Age: 63
End: 2018-11-05
Payer: COMMERCIAL

## 2018-11-05 VITALS
DIASTOLIC BLOOD PRESSURE: 78 MMHG | HEIGHT: 65 IN | WEIGHT: 189 LBS | BODY MASS INDEX: 31.49 KG/M2 | SYSTOLIC BLOOD PRESSURE: 122 MMHG

## 2018-11-05 DIAGNOSIS — Z12.39 SCREENING FOR MALIGNANT NEOPLASM OF BREAST: ICD-10-CM

## 2018-11-05 DIAGNOSIS — Z00.00 MEDICARE ANNUAL WELLNESS VISIT, INITIAL: ICD-10-CM

## 2018-11-05 DIAGNOSIS — R76.0 ANTI-CARDIOLIPIN ANTIBODY POSITIVE: ICD-10-CM

## 2018-11-05 DIAGNOSIS — M35.00 SJOGREN'S SYNDROME, WITH UNSPECIFIED ORGAN INVOLVEMENT (HCC): ICD-10-CM

## 2018-11-05 DIAGNOSIS — J84.9 ILD (INTERSTITIAL LUNG DISEASE) (HCC): Primary | ICD-10-CM

## 2018-11-05 DIAGNOSIS — Z78.0 POSTMENOPAUSAL: ICD-10-CM

## 2018-11-05 DIAGNOSIS — L40.50 PSORIATIC ARTHRITIS (HCC): ICD-10-CM

## 2018-11-05 DIAGNOSIS — R76.8 ANA POSITIVE: ICD-10-CM

## 2018-11-05 PROCEDURE — 4040F PNEUMOC VAC/ADMIN/RCVD: CPT | Performed by: FAMILY MEDICINE

## 2018-11-05 PROCEDURE — 1036F TOBACCO NON-USER: CPT | Performed by: FAMILY MEDICINE

## 2018-11-05 PROCEDURE — G0438 PPPS, INITIAL VISIT: HCPCS | Performed by: FAMILY MEDICINE

## 2018-11-05 PROCEDURE — 3008F BODY MASS INDEX DOCD: CPT | Performed by: FAMILY MEDICINE

## 2018-11-05 PROCEDURE — 99214 OFFICE O/P EST MOD 30 MIN: CPT | Performed by: FAMILY MEDICINE

## 2018-11-05 NOTE — PROGRESS NOTES
Assessment and Plan:    Problem List Items Addressed This Visit        Digestive    Sjogren's syndrome [facility])     Follow-up with Rheumatology  Recheck in 6 months            Respiratory    ILD (interstitial lung disease) ([facility] [address] ) - Primary     I will refill her cough medicine for now  Follow up with Pulmonary  Musculoskeletal and Integument    Psoriatic arthritis ([facility] [address] )     Continue to follow up with Rheumatology  Other    MALINI positive    Anti-cardiolipin antibody positive      Other Visit Diagnoses     Medicare annual wellness visit, initial        Screening for malignant neoplasm of breast        Relevant Orders    Mammo screening bilateral w cad    Postmenopausal        Relevant Orders    DXA bone density spine hip and pelvis        Health Maintenance Due   Topic Date Due    Depression Screening PHQ  1955    INFLUENZA VACCINE  07/01/2018    Fall Risk  10/08/2018    Urinary Incontinence Screening  10/08/2018    Pneumococcal PPSV23/PCV13 65+ Years / Low and Medium Risk (1 of 2 - PCV13) 10/08/2018         HPI:  Lavon Álvarez is a 70 y o  female here for her Initial Wellness Visit      Patient Active Problem List   Diagnosis    Abnormal chest CT    Abnormal PFTs    Adult BMI 30+    MALINI positive    BOOP (bronchiolitis obliterans with organizing pneumonia) ([facility] [address] )    [facility]    Primary osteoarthritis of both knees    P[facility]    Sj[facility]ren's syndrome [facility]    Anti-cardiolipin antibody positive     Past Medical History:   Diagnosis Date    Allergic rhinitis     [id number]  RESOLVED    Candidiasis of breast     [id number] RESOLVED    Diverticulosis     Hemorrhoids     Lung nodule     Pneumonia     PONV (postoperative nausea and vomiting)     Psoriatic arthritis ([facility] [address] )      Past Surgical History:   Procedure Laterality Date    BREAST LUMPECTOMY Left     COLONOSCOPY      AL BRONCHOSCOPY,DIAGNOSTIC N/A 2/8/2017    Procedure: BRONCHOSCOPY FLEXIBLE;  Surgeon: Jasbir Humphrey MD;  Location: [facility]; Service: Pulmonary    WV COLONOSCOPY FLX DX W/COLLJ SPEC WHEN PFRMD N/A 8/16/2016    Procedure: COLONOSCOPY;  Surgeon: Thiago Saini MD;  [facility]; Service: General    UMBILICAL HERNIA REPAIR       Family History   Problem Relation Age of Onset    Hypertension Mother     Hypothyroidism Mother     Breast cancer Sister     Arthritis Family      History   Smoking Status    Former Smoker    Packs/day: 0 50    Years: 15 00   Smokeless Tobacco    Never Used     Comment: DENIED HISTORY OF TOBACCO USE     History   Alcohol Use No      History   Drug Use No       Current Outpatient Prescriptions   Medication Sig Dispense Refill    albuterol (PROAIR HFA) 90 mcg/act inhaler Inhale 2 puffs 4 (four) times a day as needed      Biotin 1 MG CAPS Take by mouth      Chromium Picolinate (CHROMIUM PICOLATE PO) Take by mouth daily   COD LIVER OIL PO Take by mouth daily   folic acid (FOLVITE) 1 mg tablet Take 1 mg by mouth daily   ibuprofen (MOTRIN) 600 mg tablet Take 600 mg by mouth every 6 (six) hours as needed for mild pain   montelukast (SINGULAIR) 10 mg tablet Take 1 tablet (10 mg total) by mouth daily at bedtime 90 tablet 0    Multiple Minerals-Vitamins (CALCIUM-MAGNESIUM-ZINC-D3) TABS Take by mouth daily   Multiple Vitamins-Minerals (MULTIVITAMIN WITH MINERALS) tablet Take 1 tablet by mouth daily   omega-3-acid ethyl esters (LOVAZA) 1 g capsule Take 2 g by mouth 2 (two) times a day   Specialty Vitamins Products (MAGNESIUM, AMINO ACID CHELATE,) 133 MG tablet Take 133 mg by mouth      brompheniramine-pseudoephedrine-DM 30-2-10 MG/5ML syrup Take 5 mL by mouth 4 (four) times a day as needed for congestion or cough (Patient not taking: Reported on 11/5/2018 ) 240 mL 0     No current facility-administered medications for this visit        Allergies   Allergen Reactions    Aspirin GI Intolerance     nausea    Sulfa Antibiotics Rash     Immunization History   Administered Date(s) Administered    Influenza Split High Dose Preservative Free IM 10/30/2018    Influenza TIV (IM) 1955    Pneumococcal Conjugate 13-Valent 10/30/2018    Tdap 03/30/2016    Zoster 03/30/2016       Patient Care Team:  Marcos Canavan, DO as PCP - General  Henrene Gallus, MD Leroy Corners, MD Marcos Canavan,     Medicare Screening Tests and Risk Assessments:      Health Risk Assessment:  Patient rates overall health as very good  Patient feels that their physical health rating is Slightly better  Eyesight was rated as Same  Hearing was rated as Same  Patient feels that their emotional and mental health rating is Same  Pain experienced by patient in the last 7 days has been None  Patient states that she has experienced no weight loss or gain in last 6 months  Emotional/Mental Health:  Patient has been feeling nervous/anxious  PHQ-9 Depression Screening:    Frequency of the following problems over the past two weeks:      1  Little interest or pleasure in doing things: 0 - not at all      2  Feeling down, depressed, or hopeless: 0 - not at all  PHQ-2 Score: 0          Broken Bones/Falls: Fall Risk Assessment:    In the past year, patient has experienced: History of falling in past year     Number of falls: 1  Patient does not feel she is unsteady standing  Patient is not taking medication that can cause feelings of lightheadedness or tiredness  Patient often has no need to rush to the toilet  Chronic conditions that may contribute to falls musculoskeletal diseases  Bladder/Bowel:  Patient has not leaked urine accidently in the last six months  Patient reports no loss of bowel control  Immunizations:  Patient has had a flu vaccination within the last year  Patient has received a pneumonia shot  Patient has received a shingles shot  Home Safety:  Patient does not have trouble with stairs inside or outside of their home  Patient currently reports that there are no safety hazards present in home, working smoke alarms, working carbon monoxide detectors  Preventative Screenings:   Breast cancer screening performed, colon cancer screen completed, cholesterol screen completed, glaucoma eye exam completed,     Nutrition:  Current diet: Low Carb with servings of the following:    Medications:  Patient is able to manage medications  Lifestyle Choices:  Patient reports no tobacco use  Patient has smoked or used tobacco in the past   Patient has stopped her tobacco use  Patient reports no alcohol use  Patient drives a vehicle  Patient wears seat belt  Activities of Daily Living:  Can get out of bed by his or her self, able to dress self, able to make own meals, able to do own shopping, able to bathe self, can do own laundry/housekeeping, can manage own money, pay bills and track expenses    Previous Hospitalizations:  Hospitalization or ED visit in past 12 months  Number of hospitalizations within the last year: 1-2        Advanced Directives:  Patient has not decided on power of   Patient has not completed advanced directive  Preventative Screening/Counseling:      Cardiovascular:      General: Risks and Benefits Discussed      Comments: Patient has referral to Cardiology          Diabetes:      General: Screening Current          Colorectal Cancer:      General: Screening Current          Breast Cancer:      General: Risks and Benefits Discussed          Cervical Cancer:      General: Screening Not Indicated          Osteoporosis:      General: Risks and Benefits Discussed      Due for studies: DXA Appendicular          AAA:      General: Screening Not Indicated          Glaucoma:      General: Screening Current          HIV:      General: Screening Not Indicated          Hepatitis C:      General: Screening Current        Advanced Directives:   Patient has no living will for healthcare, Information on ACP and/or AD provided  5 wishes given  End of life assessment reviewed with patient  Provider agrees with end of life decisions   Additional Comments: Patient does not have any advanced directives at this time  I spoke to her at length, at this point in her life she would like to be resuscitated if she is found unconscious without a pulse  However, if she is deemed terminal or non recoverable she would want to have the life support withdrawn and to be made comfortable

## 2018-11-05 NOTE — PROGRESS NOTES
Assessment/Plan:    BOOP (bronchiolitis obliterans with organizing pneumonia) ([facility] )  Follow up with Pulmonary as scheduled  Recheck in this office in 6 months    Sjogren's syndrome [facility])  Follow-up with Rheumatology  Recheck in 6 months    ILD (interstitial lung disease) ([facility] )  I will refill her cough medicine for now  Follow up with Pulmonary  Psoriatic arthritis ([place] [address] )  Continue to follow up with Rheumatology  Diagnoses and all orders for this visit:    ILD (interstitial lung disease) ([facility] )    Psoriatic arthritis ([place] [address] )    Sjogren's syndrome, with unspecified organ involvement ([place] [address] )    MALINI positive    Anti-cardiolipin antibody positive    Medicare annual wellness visit, initial    Screening for malignant neoplasm of breast  -     Mammo screening bilateral w cad; Future    Postmenopausal  -     DXA bone density spine hip and pelvis; Future          Subjective:      Patient ID: Tashia Minor is a 70 y o  female  Patient is a 53-year-old female presenting to the office for follow-up on her arthritis, lung disease, and general health  She got a pneumonia shot and a flu shot at her rheumatologist's office 2 weeks ago  Overall her lung function is improving a little bit  Joints continue to bother her but they are not debilitating  The following portions of the patient's history were reviewed and updated as appropriate: allergies, current medications, past family history, past medical history, past social history, past surgical history and problem list     Review of Systems   Constitutional: Negative for activity change, chills, diaphoresis, fatigue, fever and unexpected weight change  HENT: Negative for congestion, ear pain, hearing loss, nosebleeds, postnasal drip, rhinorrhea, sinus pressure, sore throat and tinnitus  Eyes: Negative for photophobia, pain, discharge, redness and visual disturbance  Respiratory: Positive for cough   Negative for choking, chest tightness, shortness of breath and wheezing  Cardiovascular: Negative for chest pain, palpitations and leg swelling  Denies DUNN   Gastrointestinal: Negative for abdominal pain, blood in stool, constipation, diarrhea, nausea and vomiting  Endocrine: Negative  Genitourinary: Negative for difficulty urinating, dysuria, frequency, hematuria and urgency  Last mammogram was over 2 years ago  Musculoskeletal: Positive for arthralgias, gait problem, joint swelling and myalgias  Skin: Negative for rash and wound  Denies skin lesions, change in birthmarks   Allergic/Immunologic: Negative for environmental allergies, food allergies and immunocompromised state  Neurological: Negative for dizziness, tremors, seizures, syncope, weakness, numbness and headaches  Hematological: Negative  Psychiatric/Behavioral: Negative for behavioral problems, confusion, decreased concentration and sleep disturbance  The patient is not nervous/anxious  Objective:      /78   Ht 5' 5" (1 651 m)   Wt 85 7 kg (189 lb)   BMI 31 45 kg/m²          Physical Exam   Constitutional: She is oriented to person, place, and time  She appears well-developed and well-nourished  No distress  Over weight   HENT:   Head: Normocephalic and atraumatic  Right Ear: External ear normal    Left Ear: External ear normal    Nose: Nose normal    Mouth/Throat: Oropharynx is clear and moist    Eyes: Pupils are equal, round, and reactive to light  Conjunctivae and EOM are normal    Neck: Normal range of motion  Neck supple  No JVD present  No tracheal deviation present  No thyromegaly present  Cardiovascular: Normal rate, regular rhythm and normal heart sounds  No murmur heard  Pulmonary/Chest: Effort normal and breath sounds normal  She has no wheezes  She has no rales  Abdominal: Soft  Bowel sounds are normal  She exhibits no mass  There is no tenderness  There is no rebound and no guarding  Musculoskeletal: Normal range of motion   She exhibits no edema or tenderness  Arthritic changes of her hands, knees  Lymphadenopathy:     She has no cervical adenopathy  Neurological: She is alert and oriented to person, place, and time  She has normal reflexes  No cranial nerve deficit  Skin: Skin is warm and dry  No lesion and no rash noted  Psychiatric: She has a normal mood and affect  Judgment normal    Nursing note and vitals reviewed

## 2018-11-06 ENCOUNTER — TELEPHONE (OUTPATIENT)
Dept: FAMILY MEDICINE CLINIC | Facility: CLINIC | Age: 63
End: 2018-11-06

## 2018-11-06 DIAGNOSIS — J40 BRONCHITIS: ICD-10-CM

## 2018-11-06 RX ORDER — BROMPHENIRAMINE MALEATE, PSEUDOEPHEDRINE HYDROCHLORIDE, AND DEXTROMETHORPHAN HYDROBROMIDE 2; 30; 10 MG/5ML; MG/5ML; MG/5ML
5 SYRUP ORAL 4 TIMES DAILY PRN
Qty: 240 ML | Refills: 0 | Status: SHIPPED | OUTPATIENT
Start: 2018-11-06 | End: 2019-01-15

## 2019-01-03 ENCOUNTER — OFFICE VISIT (OUTPATIENT)
Dept: FAMILY MEDICINE CLINIC | Facility: CLINIC | Age: 64
End: 2019-01-03
Payer: COMMERCIAL

## 2019-01-03 ENCOUNTER — HOSPITAL ENCOUNTER (OUTPATIENT)
Dept: RADIOLOGY | Facility: HOSPITAL | Age: 64
Discharge: HOME | End: 2019-01-03
Payer: COMMERCIAL

## 2019-01-03 VITALS
OXYGEN SATURATION: 99 % | DIASTOLIC BLOOD PRESSURE: 62 MMHG | WEIGHT: 181.25 LBS | SYSTOLIC BLOOD PRESSURE: 116 MMHG | BODY MASS INDEX: 30.2 KG/M2 | HEIGHT: 65 IN

## 2019-01-03 DIAGNOSIS — M79.641 RIGHT HAND PAIN: ICD-10-CM

## 2019-01-03 DIAGNOSIS — J84.9 ILD (INTERSTITIAL LUNG DISEASE) (HCC): ICD-10-CM

## 2019-01-03 DIAGNOSIS — R22.31 LOCALIZED SWELLING ON RIGHT HAND: ICD-10-CM

## 2019-01-03 DIAGNOSIS — M35.02 SJOGREN'S SYNDROME WITH LUNG INVOLVEMENT (HCC): ICD-10-CM

## 2019-01-03 DIAGNOSIS — M35.02 SJOGREN'S SYNDROME WITH LUNG INVOLVEMENT (HCC): Primary | ICD-10-CM

## 2019-01-03 PROBLEM — J84.89 BOOP (BRONCHIOLITIS OBLITERANS WITH ORGANIZING PNEUMONIA) (HCC): Status: RESOLVED | Noted: 2017-04-05 | Resolved: 2019-01-03

## 2019-01-03 PROCEDURE — 73130 X-RAY EXAM OF HAND: CPT

## 2019-01-03 PROCEDURE — 99214 OFFICE O/P EST MOD 30 MIN: CPT | Performed by: FAMILY MEDICINE

## 2019-01-03 RX ORDER — PREDNISONE 10 MG/1
TABLET ORAL
Qty: 30 TABLET | Refills: 0 | Status: SHIPPED | OUTPATIENT
Start: 2019-01-03 | End: 2019-01-15

## 2019-01-03 NOTE — PROGRESS NOTES
Assessment/Plan:    Right hand pain  Trial of prednisone, check an x-ray  ILD (interstitial lung disease) (HCC)  Lungs sound clear, pulse ox 99% despite her complaints of dyspnea  Sjogren's syndrome with lung involvement [facility])  Follow-up with Rheumatology scheduled for February  If no improvement, may need to be seen sooner  Diagnoses and all orders for this visit:    Sjogren's syndrome with lung involvement ([address] )  -     XR hand 3+ vw right; Future  -     predniSONE 10 mg tablet; 5 x 2 days, 4 x 2 days, 3 x 2 days,2 x 2 days, 1 x 2 days  ILD (interstitial lung disease) (HCC)    Right hand pain  -     XR hand 3+ vw right; Future  -     predniSONE 10 mg tablet; 5 x 2 days, 4 x 2 days, 3 x 2 days,2 x 2 days, 1 x 2 days  Localized swelling on right hand  -     XR hand 3+ vw right; Future  -     predniSONE 10 mg tablet; 5 x 2 days, 4 x 2 days, 3 x 2 days,2 x 2 days, 1 x 2 days  Subjective:   Chief Complaint   Patient presents with    Hand Pain     right-also,fifth finger swollen        Patient ID: Sammi Whittington is a 70 y o  female  Patient is a 69-year-old female presenting to the office complaining of swelling of her right hand and left 4th finger  Patient states that she had recently started CellCept from Rheumatology  Her hand swelled up considerably at that time  She stops the medication the left hand improved somewhat, the right hand improved a little bit  He continues to complain of swelling mostly around the MCPs, with decreased strength, and some warmth  She has longstanding history of Sjogren's disease with lung involvement, and psoriatic arthritis  Currently taking Plaquenil and p r n  Ibuprofen  Ibuprofen does help somewhat but upsets her stomach greatly          The following portions of the patient's history were reviewed and updated as appropriate: allergies, current medications, past medical history, past social history and problem list     Review of Systems Constitutional: Positive for activity change and appetite change  Negative for fatigue  HENT: Negative  Eyes: Negative for visual disturbance  Respiratory: Negative  Cardiovascular: Negative  Gastrointestinal: Negative  Endocrine: Negative  Genitourinary: Negative  Musculoskeletal: Positive for arthralgias and joint swelling  Negative for gait problem and myalgias  Skin: Negative for color change and wound  Allergic/Immunologic: Positive for immunocompromised state  Neurological: Negative  Hematological: Negative  Objective:      /62   Ht 5' 5" (1 651 m)   Wt 82 2 kg (181 lb 4 oz)   SpO2 99%   BMI 30 16 kg/m²          Physical Exam   Constitutional: She is oriented to person, place, and time  She appears well-developed and well-nourished  No distress  HENT:   Head: Normocephalic and atraumatic  Right Ear: External ear normal    Left Ear: External ear normal    Nose: Nose normal    Mouth/Throat: Oropharynx is clear and moist    Eyes: Pupils are equal, round, and reactive to light  Conjunctivae and EOM are normal    Neck: Normal range of motion  Neck supple  No JVD present  No tracheal deviation present  No thyromegaly present  Cardiovascular: Normal rate, regular rhythm and normal heart sounds  No murmur heard  Pulmonary/Chest: Effort normal and breath sounds normal  She has no wheezes  She has no rales  Abdominal: Soft  Bowel sounds are normal  There is no tenderness  There is no rebound  Musculoskeletal: Normal range of motion  She exhibits edema (Over the MCPs on the right hand and the left 4th MCP PIP )  She exhibits no tenderness  Decreased hand strength  Decreased range of motion  Lymphadenopathy:     She has no cervical adenopathy  Neurological: She is alert and oriented to person, place, and time  She has normal reflexes  No cranial nerve deficit  Skin: Skin is warm and dry  No lesion and no rash noted   There is erythema (Of the right hand over the MCPs and proximal fingers  )  Psychiatric: She has a normal mood and affect  Judgment normal    Nursing note and vitals reviewed

## 2019-01-15 ENCOUNTER — OFFICE VISIT (OUTPATIENT)
Dept: FAMILY MEDICINE CLINIC | Facility: CLINIC | Age: 64
End: 2019-01-15
Payer: COMMERCIAL

## 2019-01-15 VITALS
HEIGHT: 65 IN | SYSTOLIC BLOOD PRESSURE: 110 MMHG | DIASTOLIC BLOOD PRESSURE: 72 MMHG | BODY MASS INDEX: 31.65 KG/M2 | TEMPERATURE: 97.5 F | WEIGHT: 190 LBS

## 2019-01-15 DIAGNOSIS — J01.00 ACUTE NON-RECURRENT MAXILLARY SINUSITIS: Primary | ICD-10-CM

## 2019-01-15 DIAGNOSIS — J84.9 ILD (INTERSTITIAL LUNG DISEASE) (HCC): ICD-10-CM

## 2019-01-15 PROCEDURE — 1160F RVW MEDS BY RX/DR IN RCRD: CPT | Performed by: FAMILY MEDICINE

## 2019-01-15 PROCEDURE — 3008F BODY MASS INDEX DOCD: CPT | Performed by: FAMILY MEDICINE

## 2019-01-15 PROCEDURE — 99213 OFFICE O/P EST LOW 20 MIN: CPT | Performed by: FAMILY MEDICINE

## 2019-01-15 RX ORDER — BROMPHENIRAMINE MALEATE, PSEUDOEPHEDRINE HYDROCHLORIDE, AND DEXTROMETHORPHAN HYDROBROMIDE 2; 30; 10 MG/5ML; MG/5ML; MG/5ML
5 SYRUP ORAL 4 TIMES DAILY PRN
Qty: 240 ML | Refills: 0 | Status: SHIPPED | OUTPATIENT
Start: 2019-01-15 | End: 2019-04-09

## 2019-01-15 RX ORDER — CEFDINIR 300 MG/1
300 CAPSULE ORAL EVERY 12 HOURS SCHEDULED
Qty: 20 CAPSULE | Refills: 0 | Status: SHIPPED | OUTPATIENT
Start: 2019-01-15 | End: 2019-01-25

## 2019-01-15 NOTE — PROGRESS NOTES
Assessment/Plan:    ILD (interstitial lung disease) ([facility] )  Follow-up with Pulmonary early next month as scheduled  Has a CT scan scheduled for later this week  Diagnoses and all orders for this visit:    Acute non-recurrent maxillary sinusitis  Comments:  Take medication as directed, call if symptoms do not improve  Orders:  -     cefdinir (OMNICEF) 300 mg capsule; Take 1 capsule (300 mg total) by mouth every 12 (twelve) hours for 10 days  -     brompheniramine-pseudoephedrine-DM 30-2-10 MG/5ML syrup; Take 5 mL by mouth 4 (four) times a day as needed for congestion or cough    ILD (interstitial lung disease) ([facility] )          Subjective:   Chief Complaint   Patient presents with    Cold Like Symptoms     x 1 week          Patient ID: Earney Hashimoto is a 70 y o  female  Patient is a 47-year-old female presenting to the office complaining of sinus pressure, cough, rhinitis, postnasal drainage, difficulty breathing through her nose for over a week  Took some over-the-counter medication without much success  Has had some low-grade temperatures and chills as well  The following portions of the patient's history were reviewed and updated as appropriate: allergies, current medications, past family history, past medical history, past social history, past surgical history and problem list     Review of Systems   Constitutional: Positive for activity change, appetite change, chills, fatigue and fever  HENT: Positive for congestion, postnasal drip, rhinorrhea, sinus pain, sinus pressure and sore throat  Negative for ear pain  Eyes: Negative for visual disturbance  Respiratory: Positive for cough  Negative for chest tightness and shortness of breath  Cardiovascular: Negative  Gastrointestinal: Negative  Endocrine: Positive for cold intolerance and heat intolerance  Genitourinary: Negative  Musculoskeletal: Negative for myalgias  Skin: Negative for rash     Allergic/Immunologic: Negative for immunocompromised state  Neurological: Negative for dizziness, weakness, light-headedness and numbness  Hematological: Negative for adenopathy  Does not bruise/bleed easily  Psychiatric/Behavioral: Negative for dysphoric mood and sleep disturbance  The patient is not nervous/anxious            Objective:      /72   Temp 97 5 °F (36 4 °C)   Ht 5' 5" (1 651 m)   Wt 86 2 kg (190 lb)   BMI 31 62 kg/m²          Physical Exam

## 2019-01-15 NOTE — ASSESSMENT & PLAN NOTE
Follow-up with Pulmonary early next month as scheduled  Has a CT scan scheduled for later this week

## 2019-02-18 ENCOUNTER — HOSPITAL ENCOUNTER (OUTPATIENT)
Dept: BONE DENSITY | Facility: CLINIC | Age: 64
Discharge: HOME | End: 2019-02-18
Payer: COMMERCIAL

## 2019-02-18 ENCOUNTER — HOSPITAL ENCOUNTER (OUTPATIENT)
Dept: MAMMOGRAPHY | Facility: CLINIC | Age: 64
Discharge: HOME | End: 2019-02-18
Payer: COMMERCIAL

## 2019-02-18 VITALS — BODY MASS INDEX: 31.65 KG/M2 | WEIGHT: 190 LBS | HEIGHT: 65 IN

## 2019-02-18 DIAGNOSIS — Z78.0 POSTMENOPAUSAL: ICD-10-CM

## 2019-02-18 DIAGNOSIS — Z12.39 SCREENING FOR MALIGNANT NEOPLASM OF BREAST: ICD-10-CM

## 2019-02-18 PROCEDURE — 77080 DXA BONE DENSITY AXIAL: CPT

## 2019-02-18 PROCEDURE — 77067 SCR MAMMO BI INCL CAD: CPT

## 2019-02-26 ENCOUNTER — TRANSCRIBE ORDERS (OUTPATIENT)
Dept: ADMINISTRATIVE | Facility: HOSPITAL | Age: 64
End: 2019-02-26

## 2019-02-26 ENCOUNTER — APPOINTMENT (OUTPATIENT)
Dept: LAB | Facility: HOSPITAL | Age: 64
End: 2019-02-26
Payer: COMMERCIAL

## 2019-02-26 DIAGNOSIS — M35.02 SICCA SYNDROME WITH LUNG INVOLVEMENT (HCC): Primary | ICD-10-CM

## 2019-02-26 DIAGNOSIS — Z11.59 NEED FOR HEPATITIS C SCREENING TEST: ICD-10-CM

## 2019-02-26 DIAGNOSIS — M35.02 SICCA SYNDROME WITH LUNG INVOLVEMENT (HCC): ICD-10-CM

## 2019-02-26 LAB
HBV CORE AB SER QL: NORMAL
HBV SURFACE AB SER-ACNC: 3.2 MIU/ML
HBV SURFACE AG SER QL: NORMAL
HCV AB SER QL: NORMAL

## 2019-02-26 PROCEDURE — 86706 HEP B SURFACE ANTIBODY: CPT

## 2019-02-26 PROCEDURE — 36415 COLL VENOUS BLD VENIPUNCTURE: CPT

## 2019-02-26 PROCEDURE — 83516 IMMUNOASSAY NONANTIBODY: CPT

## 2019-02-26 PROCEDURE — 87340 HEPATITIS B SURFACE AG IA: CPT

## 2019-02-26 PROCEDURE — 85613 RUSSELL VIPER VENOM DILUTED: CPT

## 2019-02-26 PROCEDURE — 86480 TB TEST CELL IMMUN MEASURE: CPT

## 2019-02-26 PROCEDURE — 86704 HEP B CORE ANTIBODY TOTAL: CPT

## 2019-02-26 PROCEDURE — 86146 BETA-2 GLYCOPROTEIN ANTIBODY: CPT

## 2019-02-26 PROCEDURE — 86147 CARDIOLIPIN ANTIBODY EA IG: CPT

## 2019-02-26 PROCEDURE — 86235 NUCLEAR ANTIGEN ANTIBODY: CPT

## 2019-02-26 PROCEDURE — 86803 HEPATITIS C AB TEST: CPT

## 2019-02-27 LAB
CARDIOLIPIN IGA SER IA-ACNC: <9 APL U/ML (ref 0–11)
CARDIOLIPIN IGG SER IA-ACNC: 19 GPL U/ML (ref 0–14)
CARDIOLIPIN IGM SER IA-ACNC: <9 MPL U/ML (ref 0–12)
GAMMA INTERFERON BACKGROUND BLD IA-ACNC: 0.04 IU/ML
M TB IFN-G BLD-IMP: NEGATIVE
M TB IFN-G CD4+ BCKGRND COR BLD-ACNC: 0 IU/ML
M TB IFN-G CD4+ BCKGRND COR BLD-ACNC: 0.01 IU/ML
MITOGEN IGNF BCKGRD COR BLD-ACNC: >10 IU/ML

## 2019-02-28 LAB
B2 GLYCOPROT1 IGA SER-ACNC: <9 GPI IGA UNITS (ref 0–25)
B2 GLYCOPROT1 IGG SER-ACNC: <9 GPI IGG UNITS (ref 0–20)
B2 GLYCOPROT1 IGM SER-ACNC: <9 GPI IGM UNITS (ref 0–32)

## 2019-03-01 LAB — ENA PM/SCL AB SER-ACNC: <2 EU/ML

## 2019-03-05 LAB — SCREEN DRVVT: NORMAL S

## 2019-03-17 LAB — MISCELLANEOUS LAB TEST RESULT: NORMAL

## 2019-04-05 LAB
MISCELLANEOUS LAB TEST RESULT: NORMAL
MISCELLANEOUS LAB TEST RESULT: NORMAL

## 2019-04-09 ENCOUNTER — TELEPHONE (OUTPATIENT)
Dept: FAMILY MEDICINE CLINIC | Facility: CLINIC | Age: 64
End: 2019-04-09

## 2019-04-09 DIAGNOSIS — R68.89 ABNORMAL FINDING ON SCREENING PROCEDURE: Primary | ICD-10-CM

## 2019-04-11 ENCOUNTER — APPOINTMENT (OUTPATIENT)
Dept: LAB | Facility: HOSPITAL | Age: 64
End: 2019-04-11
Payer: COMMERCIAL

## 2019-04-11 ENCOUNTER — TRANSCRIBE ORDERS (OUTPATIENT)
Dept: ADMINISTRATIVE | Facility: HOSPITAL | Age: 64
End: 2019-04-11

## 2019-04-11 DIAGNOSIS — M35.02 SJOGREN'S SYNDROME WITH LUNG INVOLVEMENT (HCC): ICD-10-CM

## 2019-04-11 DIAGNOSIS — M35.02 SJOGREN'S SYNDROME WITH LUNG INVOLVEMENT (HCC): Primary | ICD-10-CM

## 2019-04-11 DIAGNOSIS — Z51.81 THERAPEUTIC DRUG MONITORING: ICD-10-CM

## 2019-04-11 LAB
BASOPHILS # BLD AUTO: 0.01 THOUSANDS/ΜL (ref 0–0.1)
BASOPHILS NFR BLD AUTO: 0 % (ref 0–1)
EOSINOPHIL # BLD AUTO: 0.02 THOUSAND/ΜL (ref 0–0.61)
EOSINOPHIL NFR BLD AUTO: 0 % (ref 0–6)
ERYTHROCYTE [DISTWIDTH] IN BLOOD BY AUTOMATED COUNT: 14.9 % (ref 11.6–15.1)
HCT VFR BLD AUTO: 37.2 % (ref 34.8–46.1)
HGB BLD-MCNC: 11.8 G/DL (ref 11.5–15.4)
IMM GRANULOCYTES # BLD AUTO: 0.01 THOUSAND/UL (ref 0–0.2)
IMM GRANULOCYTES NFR BLD AUTO: 0 % (ref 0–2)
LYMPHOCYTES # BLD AUTO: 0.94 THOUSANDS/ΜL (ref 0.6–4.47)
LYMPHOCYTES NFR BLD AUTO: 19 % (ref 14–44)
MCH RBC QN AUTO: 30.2 PG (ref 26.8–34.3)
MCHC RBC AUTO-ENTMCNC: 31.7 G/DL (ref 31.4–37.4)
MCV RBC AUTO: 95 FL (ref 82–98)
MONOCYTES # BLD AUTO: 0.5 THOUSAND/ΜL (ref 0.17–1.22)
MONOCYTES NFR BLD AUTO: 10 % (ref 4–12)
NEUTROPHILS # BLD AUTO: 3.47 THOUSANDS/ΜL (ref 1.85–7.62)
NEUTS SEG NFR BLD AUTO: 71 % (ref 43–75)
NRBC BLD AUTO-RTO: 0 /100 WBCS
PLATELET # BLD AUTO: 227 THOUSANDS/UL (ref 149–390)
PMV BLD AUTO: 9.3 FL (ref 8.9–12.7)
RBC # BLD AUTO: 3.91 MILLION/UL (ref 3.81–5.12)
WBC # BLD AUTO: 4.95 THOUSAND/UL (ref 4.31–10.16)

## 2019-04-11 PROCEDURE — 85025 COMPLETE CBC W/AUTO DIFF WBC: CPT

## 2019-04-11 PROCEDURE — 36415 COLL VENOUS BLD VENIPUNCTURE: CPT

## 2019-04-23 ENCOUNTER — HOSPITAL ENCOUNTER (OUTPATIENT)
Dept: NON INVASIVE DIAGNOSTICS | Facility: CLINIC | Age: 64
Discharge: HOME | End: 2019-04-23
Payer: COMMERCIAL

## 2019-04-23 DIAGNOSIS — R68.89 ABNORMAL FINDING ON SCREENING PROCEDURE: ICD-10-CM

## 2019-04-23 PROCEDURE — 93925 LOWER EXTREMITY STUDY: CPT | Performed by: SURGERY

## 2019-04-23 PROCEDURE — 93925 LOWER EXTREMITY STUDY: CPT

## 2019-04-23 PROCEDURE — 93922 UPR/L XTREMITY ART 2 LEVELS: CPT | Performed by: SURGERY

## 2019-04-23 PROCEDURE — 93923 UPR/LXTR ART STDY 3+ LVLS: CPT

## 2019-05-20 ENCOUNTER — OFFICE VISIT (OUTPATIENT)
Dept: FAMILY MEDICINE CLINIC | Facility: CLINIC | Age: 64
End: 2019-05-20
Payer: COMMERCIAL

## 2019-05-20 VITALS
SYSTOLIC BLOOD PRESSURE: 100 MMHG | TEMPERATURE: 95 F | HEIGHT: 65 IN | BODY MASS INDEX: 29.91 KG/M2 | WEIGHT: 179.5 LBS | DIASTOLIC BLOOD PRESSURE: 70 MMHG

## 2019-05-20 DIAGNOSIS — H61.23 BILATERAL IMPACTED CERUMEN: ICD-10-CM

## 2019-05-20 DIAGNOSIS — J00 COMMON COLD: Primary | ICD-10-CM

## 2019-05-20 PROCEDURE — 99213 OFFICE O/P EST LOW 20 MIN: CPT | Performed by: FAMILY MEDICINE

## 2019-05-20 RX ORDER — BROMPHENIRAMINE MALEATE, PSEUDOEPHEDRINE HYDROCHLORIDE, AND DEXTROMETHORPHAN HYDROBROMIDE 2; 30; 10 MG/5ML; MG/5ML; MG/5ML
5 SYRUP ORAL 4 TIMES DAILY PRN
Qty: 240 ML | Refills: 0 | Status: SHIPPED | OUTPATIENT
Start: 2019-05-20 | End: 2019-05-28

## 2019-05-22 ENCOUNTER — TELEPHONE (OUTPATIENT)
Dept: FAMILY MEDICINE CLINIC | Facility: CLINIC | Age: 64
End: 2019-05-22

## 2019-05-22 DIAGNOSIS — J32.9 SINUSITIS, UNSPECIFIED CHRONICITY, UNSPECIFIED LOCATION: Primary | ICD-10-CM

## 2019-05-22 RX ORDER — CEFUROXIME AXETIL 500 MG/1
500 TABLET ORAL EVERY 12 HOURS SCHEDULED
Qty: 20 TABLET | Refills: 0 | Status: SHIPPED | OUTPATIENT
Start: 2019-05-22 | End: 2019-05-28

## 2019-05-28 ENCOUNTER — OFFICE VISIT (OUTPATIENT)
Dept: FAMILY MEDICINE CLINIC | Facility: CLINIC | Age: 64
End: 2019-05-28
Payer: COMMERCIAL

## 2019-05-28 ENCOUNTER — DOCTOR'S OFFICE (OUTPATIENT)
Dept: URBAN - METROPOLITAN AREA CLINIC 136 | Facility: CLINIC | Age: 64
Setting detail: OPHTHALMOLOGY
End: 2019-05-28
Payer: COMMERCIAL

## 2019-05-28 ENCOUNTER — RX ONLY (RX ONLY)
Age: 64
End: 2019-05-28

## 2019-05-28 VITALS
SYSTOLIC BLOOD PRESSURE: 110 MMHG | HEIGHT: 65 IN | TEMPERATURE: 98.4 F | BODY MASS INDEX: 29.82 KG/M2 | WEIGHT: 179 LBS | DIASTOLIC BLOOD PRESSURE: 72 MMHG

## 2019-05-28 DIAGNOSIS — M35.00: ICD-10-CM

## 2019-05-28 DIAGNOSIS — Z79.891: ICD-10-CM

## 2019-05-28 DIAGNOSIS — H11.063: ICD-10-CM

## 2019-05-28 DIAGNOSIS — H10.13 ALLERGIC CONJUNCTIVITIS OF BOTH EYES: ICD-10-CM

## 2019-05-28 DIAGNOSIS — J30.1 SEASONAL ALLERGIC RHINITIS DUE TO POLLEN: Primary | ICD-10-CM

## 2019-05-28 PROCEDURE — 92004 COMPRE OPH EXAM NEW PT 1/>: CPT | Performed by: OPTOMETRIST

## 2019-05-28 PROCEDURE — 99214 OFFICE O/P EST MOD 30 MIN: CPT | Performed by: FAMILY MEDICINE

## 2019-05-28 PROCEDURE — 1160F RVW MEDS BY RX/DR IN RCRD: CPT | Performed by: FAMILY MEDICINE

## 2019-05-28 PROCEDURE — 92134 CPTRZ OPH DX IMG PST SGM RTA: CPT | Performed by: OPTOMETRIST

## 2019-05-28 PROCEDURE — 1036F TOBACCO NON-USER: CPT | Performed by: FAMILY MEDICINE

## 2019-05-28 PROCEDURE — 3008F BODY MASS INDEX DOCD: CPT | Performed by: FAMILY MEDICINE

## 2019-05-28 RX ORDER — DESLORATADINE 5 MG/1
5 TABLET ORAL DAILY
Qty: 30 TABLET | Refills: 5 | Status: SHIPPED | OUTPATIENT
Start: 2019-05-28

## 2019-05-28 RX ORDER — OLOPATADINE HYDROCHLORIDE 1 MG/ML
1 SOLUTION/ DROPS OPHTHALMIC 2 TIMES DAILY
Qty: 5 ML | Refills: 2 | Status: SHIPPED | OUTPATIENT
Start: 2019-05-28

## 2019-05-28 ASSESSMENT — REFRACTION_AUTOREFRACTION
OS_SPHERE: +3.00
OS_AXIS: 099
OD_CYLINDER: -0.25
OD_AXIS: 118
OD_SPHERE: +2.25
OS_CYLINDER: -0.75

## 2019-05-28 ASSESSMENT — REFRACTION_CURRENTRX
OD_ADD: +2.50
OS_OVR_VA: 20/
OD_VPRISM_DIRECTION: BF
OS_CYLINDER: -0.75
OD_AXIS: 095
OD_CYLINDER: -0.50
OS_VPRISM_DIRECTION: BF
OS_OVR_VA: 20/
OS_ADD: +2.50
OD_OVR_VA: 20/
OD_SPHERE: +2.00
OD_OVR_VA: 20/
OS_OVR_VA: 20/
OS_AXIS: 080
OS_SPHERE: +2.75
OD_OVR_VA: 20/

## 2019-05-28 ASSESSMENT — VISUAL ACUITY
OD_BCVA: 20/20
OS_BCVA: 20/20-2

## 2019-05-28 ASSESSMENT — REFRACTION_MANIFEST
OS_CYLINDER: -0.75
OD_CYLINDER: -0.50
OS_VA1: 20/20
OS_VA1: 20/
OD_VA1: 20/20
OU_VA: 20/20
OD_VA2: 20/
OS_AXIS: 080
OD_ADD: +2.50
OD_VA3: 20/
OS_VA3: 20/
OD_VA2: 20/
OD_SPHERE: +2.00
OD_VA3: 20/
OD_AXIS: 095
OU_VA: 20/
OD_VA1: 20/
OS_VA2: 20/
OS_SPHERE: +2.75
OS_VA3: 20/
OS_VA2: 20/
OS_ADD: +2.50

## 2019-05-28 ASSESSMENT — TEAR BREAK UP TIME (TBUT)
OD_TBUT: 2+
OS_TBUT: 2+

## 2019-05-28 ASSESSMENT — CONFRONTATIONAL VISUAL FIELD TEST (CVF)
OD_FINDINGS: FULL
OS_FINDINGS: FULL

## 2019-05-28 ASSESSMENT — SPHEQUIV_DERIVED
OS_SPHEQUIV: 2.375
OD_SPHEQUIV: 1.75
OD_SPHEQUIV: 2.125
OS_SPHEQUIV: 2.625

## 2019-05-28 ASSESSMENT — CORNEAL PTERYGIUM
OD_PTERYGIUM: NASAL 1MM
OS_PTERYGIUM: NASAL 1MM

## 2019-06-18 ENCOUNTER — OPTICAL OFFICE (OUTPATIENT)
Dept: URBAN - METROPOLITAN AREA CLINIC 143 | Facility: CLINIC | Age: 64
Setting detail: OPHTHALMOLOGY
End: 2019-06-18
Payer: COMMERCIAL

## 2019-06-18 DIAGNOSIS — H52.223: ICD-10-CM

## 2019-06-18 PROCEDURE — V2201 LENS SPHERE BIFOCAL 4.12-7.0: HCPCS | Performed by: OPTOMETRIST

## 2019-06-18 PROCEDURE — V2020 VISION SVCS FRAMES PURCHASES: HCPCS | Performed by: OPTOMETRIST

## 2019-06-18 PROCEDURE — V2744 TINT PHOTOCHROMATIC LENS/ES: HCPCS | Performed by: OPTOMETRIST

## 2019-09-03 ENCOUNTER — TRANSCRIBE ORDERS (OUTPATIENT)
Dept: ADMINISTRATIVE | Facility: HOSPITAL | Age: 64
End: 2019-09-03

## 2019-09-03 ENCOUNTER — APPOINTMENT (OUTPATIENT)
Dept: LAB | Facility: HOSPITAL | Age: 64
End: 2019-09-03
Payer: COMMERCIAL

## 2019-09-03 DIAGNOSIS — Z51.81 ENCOUNTER FOR THERAPEUTIC DRUG MONITORING: ICD-10-CM

## 2019-09-03 DIAGNOSIS — R76.8 ANA POSITIVE: ICD-10-CM

## 2019-09-03 DIAGNOSIS — Z51.81 ENCOUNTER FOR THERAPEUTIC DRUG MONITORING: Primary | ICD-10-CM

## 2019-09-03 LAB
ALBUMIN SERPL BCP-MCNC: 3.7 G/DL (ref 3.5–5)
ALP SERPL-CCNC: 110 U/L (ref 46–116)
ALT SERPL W P-5'-P-CCNC: 25 U/L (ref 12–78)
ANION GAP SERPL CALCULATED.3IONS-SCNC: 8 MMOL/L (ref 4–13)
AST SERPL W P-5'-P-CCNC: 26 U/L (ref 5–45)
BACTERIA UR QL AUTO: ABNORMAL /HPF
BASOPHILS # BLD AUTO: 0.01 THOUSANDS/ΜL (ref 0–0.1)
BASOPHILS NFR BLD AUTO: 0 % (ref 0–1)
BILIRUB SERPL-MCNC: 0.32 MG/DL (ref 0.2–1)
BILIRUB UR QL STRIP: NEGATIVE
BUN SERPL-MCNC: 17 MG/DL (ref 5–25)
C3 SERPL-MCNC: 94 MG/DL (ref 90–180)
C4 SERPL-MCNC: 15 MG/DL (ref 10–40)
CALCIUM SERPL-MCNC: 9.2 MG/DL (ref 8.3–10.1)
CHLORIDE SERPL-SCNC: 105 MMOL/L (ref 100–108)
CLARITY UR: CLEAR
CO2 SERPL-SCNC: 29 MMOL/L (ref 21–32)
COLOR UR: YELLOW
CREAT SERPL-MCNC: 0.62 MG/DL (ref 0.6–1.3)
EOSINOPHIL # BLD AUTO: 0.03 THOUSAND/ΜL (ref 0–0.61)
EOSINOPHIL NFR BLD AUTO: 1 % (ref 0–6)
ERYTHROCYTE [DISTWIDTH] IN BLOOD BY AUTOMATED COUNT: 14.1 % (ref 11.6–15.1)
GFR SERPL CREATININE-BSD FRML MDRD: 95 ML/MIN/1.73SQ M
GLUCOSE P FAST SERPL-MCNC: 84 MG/DL (ref 65–99)
GLUCOSE UR STRIP-MCNC: NEGATIVE MG/DL
HCT VFR BLD AUTO: 37.3 % (ref 34.8–46.1)
HGB BLD-MCNC: 11.5 G/DL (ref 11.5–15.4)
HGB UR QL STRIP.AUTO: NEGATIVE
IMM GRANULOCYTES # BLD AUTO: 0.01 THOUSAND/UL (ref 0–0.2)
IMM GRANULOCYTES NFR BLD AUTO: 0 % (ref 0–2)
KETONES UR STRIP-MCNC: NEGATIVE MG/DL
LEUKOCYTE ESTERASE UR QL STRIP: NEGATIVE
LYMPHOCYTES # BLD AUTO: 0.74 THOUSANDS/ΜL (ref 0.6–4.47)
LYMPHOCYTES NFR BLD AUTO: 20 % (ref 14–44)
MCH RBC QN AUTO: 30.7 PG (ref 26.8–34.3)
MCHC RBC AUTO-ENTMCNC: 30.8 G/DL (ref 31.4–37.4)
MCV RBC AUTO: 100 FL (ref 82–98)
MONOCYTES # BLD AUTO: 0.45 THOUSAND/ΜL (ref 0.17–1.22)
MONOCYTES NFR BLD AUTO: 12 % (ref 4–12)
NEUTROPHILS # BLD AUTO: 2.44 THOUSANDS/ΜL (ref 1.85–7.62)
NEUTS SEG NFR BLD AUTO: 67 % (ref 43–75)
NITRITE UR QL STRIP: POSITIVE
NON-SQ EPI CELLS URNS QL MICRO: ABNORMAL /HPF
NRBC BLD AUTO-RTO: 0 /100 WBCS
PH UR STRIP.AUTO: 6.5 [PH]
PLATELET # BLD AUTO: 200 THOUSANDS/UL (ref 149–390)
PMV BLD AUTO: 8.9 FL (ref 8.9–12.7)
POTASSIUM SERPL-SCNC: 4.3 MMOL/L (ref 3.5–5.3)
PROT SERPL-MCNC: 8.1 G/DL (ref 6.4–8.2)
PROT UR STRIP-MCNC: NEGATIVE MG/DL
RBC # BLD AUTO: 3.75 MILLION/UL (ref 3.81–5.12)
RBC #/AREA URNS AUTO: ABNORMAL /HPF
SODIUM SERPL-SCNC: 142 MMOL/L (ref 136–145)
SP GR UR STRIP.AUTO: 1.01 (ref 1–1.03)
UROBILINOGEN UR QL STRIP.AUTO: 0.2 E.U./DL
WBC # BLD AUTO: 3.68 THOUSAND/UL (ref 4.31–10.16)
WBC #/AREA URNS AUTO: ABNORMAL /HPF

## 2019-09-03 PROCEDURE — 85025 COMPLETE CBC W/AUTO DIFF WBC: CPT

## 2019-09-03 PROCEDURE — 81001 URINALYSIS AUTO W/SCOPE: CPT | Performed by: INTERNAL MEDICINE

## 2019-09-03 PROCEDURE — 86160 COMPLEMENT ANTIGEN: CPT

## 2019-09-03 PROCEDURE — 80053 COMPREHEN METABOLIC PANEL: CPT

## 2019-09-03 PROCEDURE — 36415 COLL VENOUS BLD VENIPUNCTURE: CPT

## 2019-11-07 ENCOUNTER — TRANSCRIBE ORDERS (OUTPATIENT)
Dept: ADMINISTRATIVE | Facility: HOSPITAL | Age: 64
End: 2019-11-07

## 2019-11-07 ENCOUNTER — APPOINTMENT (OUTPATIENT)
Dept: LAB | Facility: HOSPITAL | Age: 64
End: 2019-11-07
Payer: COMMERCIAL

## 2019-11-07 DIAGNOSIS — M35.02 SJOGREN'S SYNDROME WITH LUNG INVOLVEMENT (HCC): ICD-10-CM

## 2019-11-07 DIAGNOSIS — Z51.81 THERAPEUTIC DRUG MONITORING: Primary | ICD-10-CM

## 2019-11-07 DIAGNOSIS — Z51.81 THERAPEUTIC DRUG MONITORING: ICD-10-CM

## 2019-11-07 LAB
ALBUMIN SERPL BCP-MCNC: 3.9 G/DL (ref 3.5–5)
ALP SERPL-CCNC: 95 U/L (ref 46–116)
ALT SERPL W P-5'-P-CCNC: 20 U/L (ref 12–78)
ANION GAP SERPL CALCULATED.3IONS-SCNC: 6 MMOL/L (ref 4–13)
AST SERPL W P-5'-P-CCNC: 24 U/L (ref 5–45)
BASOPHILS # BLD AUTO: 0.01 THOUSANDS/ΜL (ref 0–0.1)
BASOPHILS NFR BLD AUTO: 0 % (ref 0–1)
BILIRUB SERPL-MCNC: 0.47 MG/DL (ref 0.2–1)
BUN SERPL-MCNC: 20 MG/DL (ref 5–25)
CALCIUM SERPL-MCNC: 9.3 MG/DL (ref 8.3–10.1)
CHLORIDE SERPL-SCNC: 104 MMOL/L (ref 100–108)
CO2 SERPL-SCNC: 31 MMOL/L (ref 21–32)
CREAT SERPL-MCNC: 0.56 MG/DL (ref 0.6–1.3)
EOSINOPHIL # BLD AUTO: 0.01 THOUSAND/ΜL (ref 0–0.61)
EOSINOPHIL NFR BLD AUTO: 0 % (ref 0–6)
ERYTHROCYTE [DISTWIDTH] IN BLOOD BY AUTOMATED COUNT: 14 % (ref 11.6–15.1)
GFR SERPL CREATININE-BSD FRML MDRD: 97 ML/MIN/1.73SQ M
GLUCOSE P FAST SERPL-MCNC: 86 MG/DL (ref 65–99)
HCT VFR BLD AUTO: 38.6 % (ref 34.8–46.1)
HGB BLD-MCNC: 11.8 G/DL (ref 11.5–15.4)
IMM GRANULOCYTES # BLD AUTO: 0 THOUSAND/UL (ref 0–0.2)
IMM GRANULOCYTES NFR BLD AUTO: 0 % (ref 0–2)
LYMPHOCYTES # BLD AUTO: 1.05 THOUSANDS/ΜL (ref 0.6–4.47)
LYMPHOCYTES NFR BLD AUTO: 35 % (ref 14–44)
MCH RBC QN AUTO: 30.3 PG (ref 26.8–34.3)
MCHC RBC AUTO-ENTMCNC: 30.6 G/DL (ref 31.4–37.4)
MCV RBC AUTO: 99 FL (ref 82–98)
MONOCYTES # BLD AUTO: 0.45 THOUSAND/ΜL (ref 0.17–1.22)
MONOCYTES NFR BLD AUTO: 15 % (ref 4–12)
NEUTROPHILS # BLD AUTO: 1.46 THOUSANDS/ΜL (ref 1.85–7.62)
NEUTS SEG NFR BLD AUTO: 50 % (ref 43–75)
NRBC BLD AUTO-RTO: 0 /100 WBCS
PLATELET # BLD AUTO: 242 THOUSANDS/UL (ref 149–390)
PMV BLD AUTO: 9.2 FL (ref 8.9–12.7)
POTASSIUM SERPL-SCNC: 4.3 MMOL/L (ref 3.5–5.3)
PROT SERPL-MCNC: 8.2 G/DL (ref 6.4–8.2)
RBC # BLD AUTO: 3.89 MILLION/UL (ref 3.81–5.12)
SODIUM SERPL-SCNC: 141 MMOL/L (ref 136–145)
WBC # BLD AUTO: 2.98 THOUSAND/UL (ref 4.31–10.16)

## 2019-11-07 PROCEDURE — 85025 COMPLETE CBC W/AUTO DIFF WBC: CPT

## 2019-11-07 PROCEDURE — 80053 COMPREHEN METABOLIC PANEL: CPT

## 2019-11-07 PROCEDURE — 36415 COLL VENOUS BLD VENIPUNCTURE: CPT

## 2020-01-10 ENCOUNTER — APPOINTMENT (OUTPATIENT)
Dept: LAB | Facility: HOSPITAL | Age: 65
End: 2020-01-10
Payer: COMMERCIAL

## 2020-01-10 ENCOUNTER — TRANSCRIBE ORDERS (OUTPATIENT)
Dept: ADMINISTRATIVE | Facility: HOSPITAL | Age: 65
End: 2020-01-10

## 2020-01-10 DIAGNOSIS — Z51.81 ENCOUNTER FOR THERAPEUTIC DRUG MONITORING: Primary | ICD-10-CM

## 2020-01-10 DIAGNOSIS — Z51.81 ENCOUNTER FOR THERAPEUTIC DRUG MONITORING: ICD-10-CM

## 2020-01-10 LAB
ALBUMIN SERPL BCP-MCNC: 4 G/DL (ref 3.5–5)
ALP SERPL-CCNC: 93 U/L (ref 46–116)
ALT SERPL W P-5'-P-CCNC: 19 U/L (ref 12–78)
ANION GAP SERPL CALCULATED.3IONS-SCNC: 8 MMOL/L (ref 4–13)
AST SERPL W P-5'-P-CCNC: 25 U/L (ref 5–45)
BASOPHILS # BLD AUTO: 0.01 THOUSANDS/ΜL (ref 0–0.1)
BASOPHILS NFR BLD AUTO: 0 % (ref 0–1)
BILIRUB SERPL-MCNC: 0.39 MG/DL (ref 0.2–1)
BUN SERPL-MCNC: 20 MG/DL (ref 5–25)
CALCIUM SERPL-MCNC: 9.5 MG/DL (ref 8.3–10.1)
CHLORIDE SERPL-SCNC: 103 MMOL/L (ref 100–108)
CO2 SERPL-SCNC: 32 MMOL/L (ref 21–32)
CREAT SERPL-MCNC: 0.6 MG/DL (ref 0.6–1.3)
EOSINOPHIL # BLD AUTO: 0.02 THOUSAND/ΜL (ref 0–0.61)
EOSINOPHIL NFR BLD AUTO: 1 % (ref 0–6)
ERYTHROCYTE [DISTWIDTH] IN BLOOD BY AUTOMATED COUNT: 14.2 % (ref 11.6–15.1)
GFR SERPL CREATININE-BSD FRML MDRD: 95 ML/MIN/1.73SQ M
GLUCOSE P FAST SERPL-MCNC: 90 MG/DL (ref 65–99)
HCT VFR BLD AUTO: 38.6 % (ref 34.8–46.1)
HGB BLD-MCNC: 12.1 G/DL (ref 11.5–15.4)
IMM GRANULOCYTES # BLD AUTO: 0.02 THOUSAND/UL (ref 0–0.2)
IMM GRANULOCYTES NFR BLD AUTO: 1 % (ref 0–2)
LYMPHOCYTES # BLD AUTO: 0.95 THOUSANDS/ΜL (ref 0.6–4.47)
LYMPHOCYTES NFR BLD AUTO: 24 % (ref 14–44)
MCH RBC QN AUTO: 30.7 PG (ref 26.8–34.3)
MCHC RBC AUTO-ENTMCNC: 31.3 G/DL (ref 31.4–37.4)
MCV RBC AUTO: 98 FL (ref 82–98)
MONOCYTES # BLD AUTO: 0.43 THOUSAND/ΜL (ref 0.17–1.22)
MONOCYTES NFR BLD AUTO: 11 % (ref 4–12)
NEUTROPHILS # BLD AUTO: 2.61 THOUSANDS/ΜL (ref 1.85–7.62)
NEUTS SEG NFR BLD AUTO: 63 % (ref 43–75)
NRBC BLD AUTO-RTO: 0 /100 WBCS
PLATELET # BLD AUTO: 239 THOUSANDS/UL (ref 149–390)
PMV BLD AUTO: 9 FL (ref 8.9–12.7)
POTASSIUM SERPL-SCNC: 4.5 MMOL/L (ref 3.5–5.3)
PROT SERPL-MCNC: 8.6 G/DL (ref 6.4–8.2)
RBC # BLD AUTO: 3.94 MILLION/UL (ref 3.81–5.12)
SODIUM SERPL-SCNC: 143 MMOL/L (ref 136–145)
WBC # BLD AUTO: 4.04 THOUSAND/UL (ref 4.31–10.16)

## 2020-01-10 PROCEDURE — 85025 COMPLETE CBC W/AUTO DIFF WBC: CPT

## 2020-01-10 PROCEDURE — 80053 COMPREHEN METABOLIC PANEL: CPT

## 2020-01-10 PROCEDURE — 36415 COLL VENOUS BLD VENIPUNCTURE: CPT

## 2020-06-15 ENCOUNTER — OFFICE VISIT (OUTPATIENT)
Dept: FAMILY MEDICINE CLINIC | Facility: CLINIC | Age: 65
End: 2020-06-15
Payer: COMMERCIAL

## 2020-06-15 VITALS
WEIGHT: 199 LBS | SYSTOLIC BLOOD PRESSURE: 128 MMHG | DIASTOLIC BLOOD PRESSURE: 78 MMHG | TEMPERATURE: 97.4 F | HEIGHT: 65 IN | BODY MASS INDEX: 33.15 KG/M2

## 2020-06-15 DIAGNOSIS — L40.50 PSORIATIC ARTHRITIS (HCC): Primary | ICD-10-CM

## 2020-06-15 DIAGNOSIS — M35.00 SJOGREN'S SYNDROME, WITH UNSPECIFIED ORGAN INVOLVEMENT (HCC): ICD-10-CM

## 2020-06-15 DIAGNOSIS — J84.9 ILD (INTERSTITIAL LUNG DISEASE) (HCC): ICD-10-CM

## 2020-06-15 DIAGNOSIS — R07.81 RIB PAIN ON LEFT SIDE: ICD-10-CM

## 2020-06-15 DIAGNOSIS — M35.02 SJOGREN'S SYNDROME WITH LUNG INVOLVEMENT (HCC): ICD-10-CM

## 2020-06-15 DIAGNOSIS — E55.9 VITAMIN D DEFICIENCY: ICD-10-CM

## 2020-06-15 DIAGNOSIS — R63.5 ABNORMAL WEIGHT GAIN: ICD-10-CM

## 2020-06-15 DIAGNOSIS — Z13.6 SCREENING FOR CARDIOVASCULAR CONDITION: ICD-10-CM

## 2020-06-15 PROCEDURE — 99214 OFFICE O/P EST MOD 30 MIN: CPT | Performed by: FAMILY MEDICINE

## 2020-06-15 PROCEDURE — 1036F TOBACCO NON-USER: CPT | Performed by: FAMILY MEDICINE

## 2020-06-15 PROCEDURE — 4040F PNEUMOC VAC/ADMIN/RCVD: CPT | Performed by: FAMILY MEDICINE

## 2020-06-15 PROCEDURE — 1160F RVW MEDS BY RX/DR IN RCRD: CPT | Performed by: FAMILY MEDICINE

## 2020-06-15 PROCEDURE — 3008F BODY MASS INDEX DOCD: CPT | Performed by: FAMILY MEDICINE

## 2020-06-16 ENCOUNTER — HOSPITAL ENCOUNTER (OUTPATIENT)
Dept: RADIOLOGY | Facility: HOSPITAL | Age: 65
Discharge: HOME | End: 2020-06-16
Payer: COMMERCIAL

## 2020-06-16 ENCOUNTER — APPOINTMENT (OUTPATIENT)
Dept: LAB | Facility: HOSPITAL | Age: 65
End: 2020-06-16
Payer: COMMERCIAL

## 2020-06-16 ENCOUNTER — TRANSCRIBE ORDERS (OUTPATIENT)
Dept: ADMINISTRATIVE | Facility: HOSPITAL | Age: 65
End: 2020-06-16

## 2020-06-16 DIAGNOSIS — R07.81 RIB PAIN ON LEFT SIDE: ICD-10-CM

## 2020-06-16 DIAGNOSIS — M35.02 SJOGREN'S SYNDROME WITH LUNG INVOLVEMENT (HCC): ICD-10-CM

## 2020-06-16 DIAGNOSIS — J84.9 INTERSTITIAL LUNG DISEASE (HCC): ICD-10-CM

## 2020-06-16 DIAGNOSIS — R63.5 ABNORMAL WEIGHT GAIN: ICD-10-CM

## 2020-06-16 DIAGNOSIS — E55.9 VITAMIN D DEFICIENCY: ICD-10-CM

## 2020-06-16 DIAGNOSIS — R79.89 ABNORMAL TSH: Primary | ICD-10-CM

## 2020-06-16 DIAGNOSIS — Z13.6 SCREENING FOR CARDIOVASCULAR CONDITION: ICD-10-CM

## 2020-06-16 DIAGNOSIS — J84.9 INTERSTITIAL LUNG DISEASE (HCC): Primary | ICD-10-CM

## 2020-06-16 LAB
25(OH)D3 SERPL-MCNC: 72.4 NG/ML (ref 30–100)
ALBUMIN SERPL BCP-MCNC: 3.7 G/DL (ref 3.5–5)
ALP SERPL-CCNC: 93 U/L (ref 46–116)
ALT SERPL W P-5'-P-CCNC: 15 U/L (ref 12–78)
ANION GAP SERPL CALCULATED.3IONS-SCNC: 8 MMOL/L (ref 4–13)
AST SERPL W P-5'-P-CCNC: 22 U/L (ref 5–45)
BASOPHILS # BLD AUTO: 0.02 THOUSANDS/ΜL (ref 0–0.1)
BASOPHILS NFR BLD AUTO: 1 % (ref 0–1)
BILIRUB SERPL-MCNC: 0.4 MG/DL (ref 0.2–1)
BUN SERPL-MCNC: 17 MG/DL (ref 5–25)
C3 SERPL-MCNC: 99.8 MG/DL (ref 90–180)
C4 SERPL-MCNC: 11 MG/DL (ref 10–40)
CALCIUM SERPL-MCNC: 9 MG/DL (ref 8.3–10.1)
CHLORIDE SERPL-SCNC: 105 MMOL/L (ref 100–108)
CHOLEST SERPL-MCNC: 149 MG/DL (ref 50–200)
CO2 SERPL-SCNC: 29 MMOL/L (ref 21–32)
CREAT SERPL-MCNC: 0.61 MG/DL (ref 0.6–1.3)
CRP SERPL QL: <3 MG/L
EOSINOPHIL # BLD AUTO: 0.04 THOUSAND/ΜL (ref 0–0.61)
EOSINOPHIL NFR BLD AUTO: 1 % (ref 0–6)
ERYTHROCYTE [DISTWIDTH] IN BLOOD BY AUTOMATED COUNT: 14 % (ref 11.6–15.1)
GFR SERPL CREATININE-BSD FRML MDRD: 95 ML/MIN/1.73SQ M
GLUCOSE P FAST SERPL-MCNC: 90 MG/DL (ref 65–99)
HCT VFR BLD AUTO: 35.8 % (ref 34.8–46.1)
HDLC SERPL-MCNC: 54 MG/DL
HGB BLD-MCNC: 11.1 G/DL (ref 11.5–15.4)
IMM GRANULOCYTES # BLD AUTO: 0.01 THOUSAND/UL (ref 0–0.2)
IMM GRANULOCYTES NFR BLD AUTO: 0 % (ref 0–2)
LDLC SERPL CALC-MCNC: 85 MG/DL (ref 0–100)
LYMPHOCYTES # BLD AUTO: 0.89 THOUSANDS/ΜL (ref 0.6–4.47)
LYMPHOCYTES NFR BLD AUTO: 22 % (ref 14–44)
MCH RBC QN AUTO: 31.2 PG (ref 26.8–34.3)
MCHC RBC AUTO-ENTMCNC: 31 G/DL (ref 31.4–37.4)
MCV RBC AUTO: 101 FL (ref 82–98)
MONOCYTES # BLD AUTO: 0.44 THOUSAND/ΜL (ref 0.17–1.22)
MONOCYTES NFR BLD AUTO: 11 % (ref 4–12)
NEUTROPHILS # BLD AUTO: 2.65 THOUSANDS/ΜL (ref 1.85–7.62)
NEUTS SEG NFR BLD AUTO: 65 % (ref 43–75)
NONHDLC SERPL-MCNC: 95 MG/DL
NRBC BLD AUTO-RTO: 0 /100 WBCS
PLATELET # BLD AUTO: 203 THOUSANDS/UL (ref 149–390)
PMV BLD AUTO: 8.9 FL (ref 8.9–12.7)
POTASSIUM SERPL-SCNC: 4.2 MMOL/L (ref 3.5–5.3)
PROT SERPL-MCNC: 8 G/DL (ref 6.4–8.2)
RBC # BLD AUTO: 3.56 MILLION/UL (ref 3.81–5.12)
SODIUM SERPL-SCNC: 142 MMOL/L (ref 136–145)
TRIGL SERPL-MCNC: 49 MG/DL
TSH SERPL DL<=0.05 MIU/L-ACNC: 4.14 UIU/ML (ref 0.36–3.74)
WBC # BLD AUTO: 4.05 THOUSAND/UL (ref 4.31–10.16)

## 2020-06-16 PROCEDURE — 84443 ASSAY THYROID STIM HORMONE: CPT

## 2020-06-16 PROCEDURE — 80053 COMPREHEN METABOLIC PANEL: CPT

## 2020-06-16 PROCEDURE — 86160 COMPLEMENT ANTIGEN: CPT

## 2020-06-16 PROCEDURE — 85025 COMPLETE CBC W/AUTO DIFF WBC: CPT

## 2020-06-16 PROCEDURE — 36415 COLL VENOUS BLD VENIPUNCTURE: CPT

## 2020-06-16 PROCEDURE — 86140 C-REACTIVE PROTEIN: CPT

## 2020-06-16 PROCEDURE — 80061 LIPID PANEL: CPT

## 2020-06-16 PROCEDURE — 71101 X-RAY EXAM UNILAT RIBS/CHEST: CPT

## 2020-06-16 PROCEDURE — 82306 VITAMIN D 25 HYDROXY: CPT

## 2020-06-18 DIAGNOSIS — J84.9 ILD (INTERSTITIAL LUNG DISEASE) (HCC): Primary | ICD-10-CM

## 2020-06-18 DIAGNOSIS — R07.81 RIB PAIN ON LEFT SIDE: ICD-10-CM

## 2020-08-03 ENCOUNTER — DOCTOR'S OFFICE (OUTPATIENT)
Dept: URBAN - METROPOLITAN AREA CLINIC 136 | Facility: CLINIC | Age: 65
Setting detail: OPHTHALMOLOGY
End: 2020-08-03
Payer: COMMERCIAL

## 2020-08-03 DIAGNOSIS — Z79.891: ICD-10-CM

## 2020-08-03 DIAGNOSIS — H52.4: ICD-10-CM

## 2020-08-03 DIAGNOSIS — H52.03: ICD-10-CM

## 2020-08-03 PROBLEM — H25.13 CATARACT NUCLEAR SCLEROSIS AGE RELATED; BOTH EYES: Status: ACTIVE | Noted: 2020-08-03

## 2020-08-03 PROBLEM — H11.063 PTERYGIUM; BOTH EYES: Status: ACTIVE | Noted: 2019-05-28
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
## 2020-08-03 PROBLEM — M35.00 KERATITIS SICCA/SJOGRENS: Status: ACTIVE | Noted: 2019-05-28

## 2020-08-03 PROCEDURE — 92014 COMPRE OPH EXAM EST PT 1/>: CPT | Performed by: OPTOMETRIST

## 2020-08-03 PROCEDURE — 92134 CPTRZ OPH DX IMG PST SGM RTA: CPT | Performed by: OPTOMETRIST

## 2020-08-03 PROCEDURE — 92015 DETERMINE REFRACTIVE STATE: CPT | Performed by: OPTOMETRIST

## 2020-08-03 ASSESSMENT — REFRACTION_MANIFEST
OS_CYLINDER: -0.75
OD_CYLINDER: -0.50
OD_ADD: +2.50
OS_AXIS: 080
OS_VA1: 20/20
OS_ADD: +2.50
OD_VA1: 20/20
OD_AXIS: 095
OU_VA: 20/20
OS_SPHERE: +3.00
OD_SPHERE: +2.25

## 2020-08-03 ASSESSMENT — TEAR BREAK UP TIME (TBUT)
OD_TBUT: 2+
OS_TBUT: 2+

## 2020-08-03 ASSESSMENT — REFRACTION_CURRENTRX
OS_VPRISM_DIRECTION: BF
OD_AXIS: 107
OD_ADD: +2.50
OD_CYLINDER: -0.50
OS_SPHERE: +2.75
OD_VPRISM_DIRECTION: BF
OS_CYLINDER: -0.75
OD_OVR_VA: 20/
OD_SPHERE: +2.00
OS_OVR_VA: 20/
OS_AXIS: 082
OS_ADD: +2.50

## 2020-08-03 ASSESSMENT — SPHEQUIV_DERIVED
OD_SPHEQUIV: 2.625
OS_SPHEQUIV: 3.125
OS_SPHEQUIV: 2.625
OD_SPHEQUIV: 2

## 2020-08-03 ASSESSMENT — VISUAL ACUITY
OD_BCVA: 20/20-2
OS_BCVA: 20/20-3

## 2020-08-03 ASSESSMENT — CONFRONTATIONAL VISUAL FIELD TEST (CVF)
OD_FINDINGS: FULL
OS_FINDINGS: FULL

## 2020-08-03 ASSESSMENT — REFRACTION_AUTOREFRACTION
OD_CYLINDER: -0.75
OS_SPHERE: +3.50
OD_SPHERE: +3.00
OS_CYLINDER: -0.75
OD_AXIS: 088
OS_AXIS: 103

## 2020-08-03 ASSESSMENT — CORNEAL PTERYGIUM
OS_PTERYGIUM: NASAL 1MM
OD_PTERYGIUM: NASAL 1MM

## 2020-08-12 ENCOUNTER — OPTICAL OFFICE (OUTPATIENT)
Dept: URBAN - METROPOLITAN AREA CLINIC 143 | Facility: CLINIC | Age: 65
Setting detail: OPHTHALMOLOGY
End: 2020-08-12
Payer: COMMERCIAL

## 2020-08-12 DIAGNOSIS — H52.223: ICD-10-CM

## 2020-08-12 PROCEDURE — V2203 LENS SPHCYL BIFOCAL 4.00D/.1: HCPCS | Performed by: OPTOMETRIST

## 2020-08-12 PROCEDURE — V2020 VISION SVCS FRAMES PURCHASES: HCPCS | Performed by: OPTOMETRIST

## 2020-10-20 ENCOUNTER — IMMUNIZATIONS (OUTPATIENT)
Dept: FAMILY MEDICINE CLINIC | Facility: CLINIC | Age: 65
End: 2020-10-20
Payer: COMMERCIAL

## 2020-10-20 DIAGNOSIS — Z23 NEEDS FLU SHOT: Primary | ICD-10-CM

## 2020-10-20 PROCEDURE — 90662 IIV NO PRSV INCREASED AG IM: CPT | Performed by: FAMILY MEDICINE

## 2020-10-20 PROCEDURE — G0008 ADMIN INFLUENZA VIRUS VAC: HCPCS | Performed by: FAMILY MEDICINE

## 2020-11-23 ENCOUNTER — TELEMEDICINE (OUTPATIENT)
Dept: FAMILY MEDICINE CLINIC | Facility: CLINIC | Age: 65
End: 2020-11-23
Payer: COMMERCIAL

## 2020-11-23 DIAGNOSIS — Z20.822 EXPOSURE TO COVID-19 VIRUS: Primary | ICD-10-CM

## 2020-11-23 DIAGNOSIS — Z20.822 EXPOSURE TO COVID-19 VIRUS: ICD-10-CM

## 2020-11-23 PROCEDURE — U0003 INFECTIOUS AGENT DETECTION BY NUCLEIC ACID (DNA OR RNA); SEVERE ACUTE RESPIRATORY SYNDROME CORONAVIRUS 2 (SARS-COV-2) (CORONAVIRUS DISEASE [COVID-19]), AMPLIFIED PROBE TECHNIQUE, MAKING USE OF HIGH THROUGHPUT TECHNOLOGIES AS DESCRIBED BY CMS-2020-01-R: HCPCS | Performed by: FAMILY MEDICINE

## 2020-11-23 PROCEDURE — 99213 OFFICE O/P EST LOW 20 MIN: CPT | Performed by: FAMILY MEDICINE

## 2020-11-23 PROCEDURE — 1160F RVW MEDS BY RX/DR IN RCRD: CPT | Performed by: FAMILY MEDICINE

## 2020-11-24 LAB — SARS-COV-2 RNA SPEC QL NAA+PROBE: NOT DETECTED

## 2020-12-03 ENCOUNTER — LAB (OUTPATIENT)
Dept: LAB | Facility: HOSPITAL | Age: 65
End: 2020-12-03
Payer: COMMERCIAL

## 2020-12-03 ENCOUNTER — TRANSCRIBE ORDERS (OUTPATIENT)
Dept: ADMINISTRATIVE | Facility: HOSPITAL | Age: 65
End: 2020-12-03

## 2020-12-03 DIAGNOSIS — E55.9 VITAMIN D DEFICIENCY: Primary | ICD-10-CM

## 2020-12-03 DIAGNOSIS — E55.9 VITAMIN D DEFICIENCY: ICD-10-CM

## 2020-12-03 LAB — 25(OH)D3 SERPL-MCNC: 73.5 NG/ML (ref 30–100)

## 2020-12-03 PROCEDURE — 36415 COLL VENOUS BLD VENIPUNCTURE: CPT

## 2020-12-03 PROCEDURE — 82306 VITAMIN D 25 HYDROXY: CPT

## 2020-12-29 ENCOUNTER — TELEPHONE (OUTPATIENT)
Dept: ADMINISTRATIVE | Facility: OTHER | Age: 65
End: 2020-12-29

## 2020-12-31 ENCOUNTER — OFFICE VISIT (OUTPATIENT)
Dept: FAMILY MEDICINE CLINIC | Facility: CLINIC | Age: 65
End: 2020-12-31
Payer: COMMERCIAL

## 2020-12-31 VITALS
WEIGHT: 211.6 LBS | HEIGHT: 65 IN | TEMPERATURE: 97.5 F | SYSTOLIC BLOOD PRESSURE: 124 MMHG | BODY MASS INDEX: 35.25 KG/M2 | DIASTOLIC BLOOD PRESSURE: 80 MMHG

## 2020-12-31 DIAGNOSIS — M35.02 SJOGREN'S SYNDROME WITH LUNG INVOLVEMENT (HCC): Primary | ICD-10-CM

## 2020-12-31 DIAGNOSIS — H93.12 TINNITUS OF LEFT EAR: ICD-10-CM

## 2020-12-31 DIAGNOSIS — M17.0 PRIMARY OSTEOARTHRITIS OF BOTH KNEES: ICD-10-CM

## 2020-12-31 DIAGNOSIS — R79.89 ABNORMAL TSH: ICD-10-CM

## 2020-12-31 DIAGNOSIS — I71.2 THORACIC AORTIC ANEURYSM WITHOUT RUPTURE (HCC): ICD-10-CM

## 2020-12-31 DIAGNOSIS — Z23 ENCOUNTER FOR IMMUNIZATION: ICD-10-CM

## 2020-12-31 DIAGNOSIS — J84.9 ILD (INTERSTITIAL LUNG DISEASE) (HCC): ICD-10-CM

## 2020-12-31 DIAGNOSIS — Z00.00 MEDICARE ANNUAL WELLNESS VISIT, INITIAL: ICD-10-CM

## 2020-12-31 DIAGNOSIS — Z13.6 SCREENING FOR CARDIOVASCULAR CONDITION: ICD-10-CM

## 2020-12-31 DIAGNOSIS — E55.9 VITAMIN D DEFICIENCY: ICD-10-CM

## 2020-12-31 PROBLEM — R93.89 ABNORMAL CHEST CT: Status: RESOLVED | Noted: 2017-01-04 | Resolved: 2020-12-31

## 2020-12-31 PROBLEM — I71.20 THORACIC AORTIC ANEURYSM WITHOUT RUPTURE: Status: ACTIVE | Noted: 2020-12-09

## 2020-12-31 PROCEDURE — 99214 OFFICE O/P EST MOD 30 MIN: CPT | Performed by: FAMILY MEDICINE

## 2020-12-31 PROCEDURE — 1160F RVW MEDS BY RX/DR IN RCRD: CPT | Performed by: FAMILY MEDICINE

## 2020-12-31 PROCEDURE — 3008F BODY MASS INDEX DOCD: CPT | Performed by: FAMILY MEDICINE

## 2020-12-31 PROCEDURE — G0439 PPPS, SUBSEQ VISIT: HCPCS | Performed by: FAMILY MEDICINE

## 2021-01-04 ENCOUNTER — TELEPHONE (OUTPATIENT)
Dept: FAMILY MEDICINE CLINIC | Facility: CLINIC | Age: 66
End: 2021-01-04

## 2021-01-04 DIAGNOSIS — J84.9 ILD (INTERSTITIAL LUNG DISEASE) (HCC): Primary | ICD-10-CM

## 2021-01-04 RX ORDER — BROMPHENIRAMINE MALEATE, PSEUDOEPHEDRINE HYDROCHLORIDE, AND DEXTROMETHORPHAN HYDROBROMIDE 2; 30; 10 MG/5ML; MG/5ML; MG/5ML
5 SYRUP ORAL 4 TIMES DAILY PRN
Qty: 240 ML | Refills: 0 | Status: SHIPPED | OUTPATIENT
Start: 2021-01-04 | End: 2021-03-25

## 2021-01-12 ENCOUNTER — LAB (OUTPATIENT)
Dept: LAB | Facility: HOSPITAL | Age: 66
End: 2021-01-12
Payer: COMMERCIAL

## 2021-01-12 DIAGNOSIS — Z13.6 SCREENING FOR CARDIOVASCULAR CONDITION: ICD-10-CM

## 2021-01-12 DIAGNOSIS — E55.9 VITAMIN D DEFICIENCY: ICD-10-CM

## 2021-01-12 DIAGNOSIS — R79.89 ABNORMAL TSH: ICD-10-CM

## 2021-01-12 LAB
25(OH)D3 SERPL-MCNC: 69.2 NG/ML (ref 30–100)
ALBUMIN SERPL BCP-MCNC: 4.1 G/DL (ref 3.5–5)
ALP SERPL-CCNC: 103 U/L (ref 46–116)
ALT SERPL W P-5'-P-CCNC: 23 U/L (ref 12–78)
ANION GAP SERPL CALCULATED.3IONS-SCNC: 6 MMOL/L (ref 4–13)
AST SERPL W P-5'-P-CCNC: 25 U/L (ref 5–45)
BASOPHILS # BLD AUTO: 0 THOUSANDS/ΜL (ref 0–0.1)
BASOPHILS NFR BLD AUTO: 0 % (ref 0–1)
BILIRUB SERPL-MCNC: 0.34 MG/DL (ref 0.2–1)
BUN SERPL-MCNC: 22 MG/DL (ref 5–25)
CALCIUM SERPL-MCNC: 9.7 MG/DL (ref 8.3–10.1)
CHLORIDE SERPL-SCNC: 102 MMOL/L (ref 100–108)
CHOLEST SERPL-MCNC: 171 MG/DL (ref 50–200)
CO2 SERPL-SCNC: 31 MMOL/L (ref 21–32)
CREAT SERPL-MCNC: 0.46 MG/DL (ref 0.6–1.3)
EOSINOPHIL # BLD AUTO: 0.01 THOUSAND/ΜL (ref 0–0.61)
EOSINOPHIL NFR BLD AUTO: 0 % (ref 0–6)
ERYTHROCYTE [DISTWIDTH] IN BLOOD BY AUTOMATED COUNT: 14 % (ref 11.6–15.1)
GFR SERPL CREATININE-BSD FRML MDRD: 103 ML/MIN/1.73SQ M
GLUCOSE P FAST SERPL-MCNC: 95 MG/DL (ref 65–99)
HCT VFR BLD AUTO: 38.3 % (ref 34.8–46.1)
HDLC SERPL-MCNC: 69 MG/DL
HGB BLD-MCNC: 11.9 G/DL (ref 11.5–15.4)
IMM GRANULOCYTES # BLD AUTO: 0.01 THOUSAND/UL (ref 0–0.2)
IMM GRANULOCYTES NFR BLD AUTO: 0 % (ref 0–2)
LDLC SERPL CALC-MCNC: 96 MG/DL (ref 0–100)
LYMPHOCYTES # BLD AUTO: 0.85 THOUSANDS/ΜL (ref 0.6–4.47)
LYMPHOCYTES NFR BLD AUTO: 19 % (ref 14–44)
MCH RBC QN AUTO: 30.2 PG (ref 26.8–34.3)
MCHC RBC AUTO-ENTMCNC: 31.1 G/DL (ref 31.4–37.4)
MCV RBC AUTO: 97 FL (ref 82–98)
MONOCYTES # BLD AUTO: 0.37 THOUSAND/ΜL (ref 0.17–1.22)
MONOCYTES NFR BLD AUTO: 8 % (ref 4–12)
NEUTROPHILS # BLD AUTO: 3.23 THOUSANDS/ΜL (ref 1.85–7.62)
NEUTS SEG NFR BLD AUTO: 73 % (ref 43–75)
NONHDLC SERPL-MCNC: 102 MG/DL
NRBC BLD AUTO-RTO: 0 /100 WBCS
PLATELET # BLD AUTO: 255 THOUSANDS/UL (ref 149–390)
PMV BLD AUTO: 8.8 FL (ref 8.9–12.7)
POTASSIUM SERPL-SCNC: 4.3 MMOL/L (ref 3.5–5.3)
PROT SERPL-MCNC: 9.2 G/DL (ref 6.4–8.2)
RBC # BLD AUTO: 3.94 MILLION/UL (ref 3.81–5.12)
SODIUM SERPL-SCNC: 139 MMOL/L (ref 136–145)
TRIGL SERPL-MCNC: 32 MG/DL
TSH SERPL DL<=0.05 MIU/L-ACNC: 1.84 UIU/ML (ref 0.36–3.74)
WBC # BLD AUTO: 4.47 THOUSAND/UL (ref 4.31–10.16)

## 2021-01-12 PROCEDURE — 85025 COMPLETE CBC W/AUTO DIFF WBC: CPT

## 2021-01-12 PROCEDURE — 80061 LIPID PANEL: CPT

## 2021-01-12 PROCEDURE — 80053 COMPREHEN METABOLIC PANEL: CPT

## 2021-01-12 PROCEDURE — 84443 ASSAY THYROID STIM HORMONE: CPT

## 2021-01-12 PROCEDURE — 82306 VITAMIN D 25 HYDROXY: CPT

## 2021-01-12 PROCEDURE — 36415 COLL VENOUS BLD VENIPUNCTURE: CPT

## 2021-01-29 ENCOUNTER — TELEPHONE (OUTPATIENT)
Dept: FAMILY MEDICINE CLINIC | Facility: CLINIC | Age: 66
End: 2021-01-29

## 2021-01-29 ENCOUNTER — TELEMEDICINE (OUTPATIENT)
Dept: FAMILY MEDICINE CLINIC | Facility: CLINIC | Age: 66
End: 2021-01-29
Payer: COMMERCIAL

## 2021-01-29 DIAGNOSIS — Z20.822 EXPOSURE TO COVID-19 VIRUS: Primary | ICD-10-CM

## 2021-01-29 DIAGNOSIS — Z20.822 EXPOSURE TO COVID-19 VIRUS: ICD-10-CM

## 2021-01-29 PROCEDURE — 99441 PR PHYS/QHP TELEPHONE EVALUATION 5-10 MIN: CPT | Performed by: FAMILY MEDICINE

## 2021-01-29 PROCEDURE — U0003 INFECTIOUS AGENT DETECTION BY NUCLEIC ACID (DNA OR RNA); SEVERE ACUTE RESPIRATORY SYNDROME CORONAVIRUS 2 (SARS-COV-2) (CORONAVIRUS DISEASE [COVID-19]), AMPLIFIED PROBE TECHNIQUE, MAKING USE OF HIGH THROUGHPUT TECHNOLOGIES AS DESCRIBED BY CMS-2020-01-R: HCPCS | Performed by: FAMILY MEDICINE

## 2021-01-29 PROCEDURE — U0005 INFEC AGEN DETEC AMPLI PROBE: HCPCS | Performed by: FAMILY MEDICINE

## 2021-01-29 NOTE — PROGRESS NOTES
COVID-19 Virtual Visit     Assessment/Plan:    Problem List Items Addressed This Visit     None         Disposition:     I referred patient to one of our centralized sites for a COVID-19 swab  Recommend vitamin-C 1000 mg daily, vitamin-D 1000 mg daily, zinc 50 mg daily  Isolated home with her   Hopefully will have the test results within 24-48 hours  [address]    Your healthcare provider and/or public health staff have evaluated you and have determined that you do not need to remain in the hospital at this time   At this time you can be isolated at home where you will be monitored by staff from your local or state health department  You should carefully follow the prevention and isolation steps below until a healthcare provider or local or state health department says that you can return to your normal activities  Stay home except to get medical care    People who are mildly ill with COVID-19 are able to isolate at home during their illness  You should restrict activities outside your home, except for getting medical care  Do not go to work, school, or public areas  Avoid using public transportation, ride-sharing, or taxis  Separate yourself from other people and animals in your home    People: As much as possible, you should stay in a specific room and away from other people in your home  Also, you should use a separate bathroom, if available  Animals: You should restrict contact with pets and other animals while you are sick with COVID-19, just like you would around other people  Although there have not been reports of pets or other animals becoming sick with COVID-19, it is still recommended that people sick with COVID-19 limit contact with animals until more information is known about the virus  When possible, have another member of your household care for your animals while you are sick   If you are sick with COVID-19, avoid contact with your pet, including petting, snuggling, being kissed or licked, and sharing food  If you must care for your pet or be around animals while you are sick, wash your hands before and after you interact with pets and wear a facemask  See COVID-19 and Animals for more information  Call ahead before visiting your doctor    If you have a medical appointment, call the healthcare provider and tell them that you have or may have COVID-19  This will help the healthcare provider's office take steps to keep other people from getting infected or exposed  Wear a facemask    You should wear a facemask when you are around other people (e g , sharing a room or vehicle) or pets and before you enter a healthcare provider's office  If you are not able to wear a facemask (for example, because it causes trouble breathing), then people who live with you should not stay in the same room with you, or they should wear a facemask if they enter your room  Cover your coughs and sneezes    Cover your mouth and nose with a tissue when you cough or sneeze  Throw used tissues in a lined trash can  Immediately wash your hands with soap and water for at least 20 seconds or, if soap and water are not available, clean your hands with an alcohol-based hand  that contains at least 60% alcohol  Clean your hands often    Wash your hands often with soap and water for at least 20 seconds, especially after blowing your nose, coughing, or sneezing; going to the bathroom; and before eating or preparing food  If soap and water are not readily available, use an alcohol-based hand  with at least 60% alcohol, covering all surfaces of your hands and rubbing them together until they feel dry  Soap and water are the best option if hands are visibly dirty  Avoid touching your eyes, nose, and mouth with unwashed hands      Avoid sharing personal household items    You should not share dishes, drinking glasses, cups, eating utensils, towels, or bedding with other people or pets in your home  After using these items, they should be washed thoroughly with soap and water  Clean all "high-touch" surfaces everyday    High touch surfaces include counters, tabletops, doorknobs, bathroom fixtures, toilets, phones, keyboards, tablets, and bedside tables  Also, clean any surfaces that may have blood, stool, or body fluids on them  Use a household cleaning spray or wipe, according to the label instructions  Labels contain instructions for safe and effective use of the cleaning product including precautions you should take when applying the product, such as wearing gloves and making sure you have good ventilation during use of the product  Monitor your symptoms    Seek prompt medical attention if your illness is worsening (e g , difficulty breathing)  Before seeking care, call your healthcare provider and tell them that you have, or are being evaluated for, COVID-19  Put on a facemask before you enter the facility  These steps will help the healthcare provider's office to keep other people in the office or waiting room from getting infected or exposed  Ask your healthcare provider to call the local or [facility] health department  Persons who are placed under active monitoring or facilitated self-monitoring should follow instructions provided by their local health department or occupational health professionals, as appropriate  If you have a medical emergency and need to call 911, notify the dispatch personnel that you have, or are being evaluated for COVID-19  If possible, put on a facemask before emergency medical services arrive  Discontinuing home isolation    Patients with confirmed COVID-19 should remain under home isolation precautions until the following conditions are met:    They have had no fever for at least 24 hours (that is one full day of no fever without the use medicine that reduces fevers)  AND  other symptoms have improved (for example, when their cough or shortness of breath have improved)  AND  If had mild or moderate illness, at least 10 days have passed since their symptoms first appeared or if severe illness (needed oxygen) or immunosuppressed, at least 20 days have passed since symptoms first appeared  Patients with confirmed COVID-19 should also notify close contacts (including their workplace) and ask that they self-quarantine  Currently, close contact is defined as being within 6 feet for 15 minutes or more from the period 24 hours starting 48 hours before symptom onset to the time at which the patient went into isolation   Close contacts of patients diagnosed with COVID-19 should be instructed by the patient to self-quarantine for 14 days from the last time of their last contact with the patient  Source: [employer]      I have spent 10 minutes directly with the patient  Greater than 50% of this time was spent in counseling/coordination of care regarding: risks and benefits of treatment options, instructions for management, patient and family education, importance of treatment compliance, risk factor reductions and impressions  Encounter provider Maureen Osler, DO    Provider located at [address] [place] [address] Road [id number]    Recent Visits  No visits were found meeting these conditions  Showing recent visits within past 7 days and meeting all other requirements     Today's Visits  Date Type Provider Dept   01/29/21 Telephone DANIEL Jacob [facility]   Showing today's visits and meeting all other requirements     Future Appointments  No visits were found meeting these conditions  Showing future appointments within next 150 days and meeting all other requirements        Patient agrees to participate in a virtual check in via telephone or video visit instead of presenting to the office to address urgent/immediate medical needs   Patient is aware this is a billable service  After connecting through Telephone, the patient was identified by name and date of birth  Sam Rodriguez was informed that this was a telemedicine visit and that the exam was being conducted confidentially over secure lines  My office door was closed  No one else was in the room  Sam Rodriguez acknowledged consent and understanding of privacy and security of the telemedicine visit  I informed the patient that I have reviewed her record in Epic and presented the opportunity for her to ask any questions regarding the visit today  The patient agreed to participate  It was my intent to perform this visit via video technology but the patient was not able to do a video connection so the visit was completed via audio telephone only  Subjective:   Sam Rodriguez is a 77 y o  female who is concerned about COVID-19  Patient's symptoms include nasal congestion, rhinorrhea, sore throat and myalgias  Patient denies fever, chills, fatigue, malaise, anosmia, loss of taste, cough, shortness of breath, chest tightness, abdominal pain, nausea, vomiting, diarrhea and headaches         Date of symptom onset: 1/29/2021  Date of exposure: 1/24/2021    Exposure:   Contact with a person who is under investigation (PUI) for or who is positive for COVID-19 within the last 14 days?: Yes    Hospitalized recently for fever and/or lower respiratory symptoms?: No      Currently a healthcare worker that is involved in direct patient care?: No      Works in a special setting where the risk of COVID-19 transmission may be high? (this may include long-term care, correctional and custodial facilities; homeless shelters; assisted-living facilities and group homes ): No      Resident in a special setting where the risk of COVID-19 transmission may be high? (this may include long-term care, correctional and custodial facilities; homeless shelters; assisted-living facilities and group homes ): No         was started with symptoms a couple days ago  Their son-in-law tested positive yesterday, they were around him on Sunday  Patient is concerned because of her restrictive lung disease  I sent in an order for testing earlier    Lab Results   Component Value Date    SARSCOV2 Not Detected 11/23/2020    [address] Not Detected 10/30/2020     Past Medical History:   Diagnosis Date    Allergic     Allergic rhinitis     [id number]  RESOLVED    Candidiasis of breast     [id number] RESOLVED    Diverticulosis     Hemorrhoids     Lung nodule     Pneumonia     PONV (postoperative nausea and vomiting)     Psoriatic [facility] ([facility] )     Sjogren's syndrome ([facility] )     Followed by rheumatology - Dr Hardy Hanna     Past Surgical History:   Procedure Laterality Date    BREAST LUMPECTOMY Left     COLONOSCOPY      [place] [address] N/A 2/8/2017    Procedure: Soniya Baumann;  Surgeon: Sylvia Coles MD;  Location: [facility]; Service: Pulmonary    SC COLONOSCOPY FLX DX W/COLLJ SPEC WHEN PFRMD N/A 8/16/2016    Procedure: COLONOSCOPY;  Surgeon: Bonnie Quijano MD;  Location: [facility]; Service: General    UMBILICAL HERNIA REPAIR       Current Outpatient Medications   Medication Sig Dispense Refill    albuterol (PROAIR HFA) 90 mcg/act inhaler Inhale 2 puffs 4 (four) times a day as needed      Biotin 1 MG CAPS Take by mouth      brompheniramine-pseudoephedrine-DM 30-2-10 MG/5ML syrup Take 5 mL by mouth 4 (four) times a day as needed for congestion or cough 240 mL 0    Chromium Picolinate (CHROMIUM PICOLATE PO) Take by mouth daily   COD LIVER OIL PO Take by mouth daily   desloratadine (CLARINEX) 5 MG tablet Take 1 tablet (5 mg total) by mouth daily 30 tablet 5    folic acid (FOLVITE) 1 mg tablet Take 1 mg by mouth daily   ibuprofen (MOTRIN) 600 mg tablet Take 600 mg by mouth every 6 (six) hours as needed for mild pain        montelukast (SINGULAIR) 10 mg tablet Take 1 tablet (10 mg total) by mouth daily at bedtime 90 tablet 0    Multiple Minerals-Vitamins (CALCIUM-MAGNESIUM-ZINC-D3) TABS Take by mouth daily   Multiple Vitamins-Minerals (MULTIVITAMIN WITH MINERALS) tablet Take 1 tablet by mouth daily   olopatadine (PATANOL) 0 1 % ophthalmic solution Administer 1 drop to both eyes 2 (two) times a day 5 mL 2    omega-3-acid ethyl esters (LOVAZA) 1 g capsule Take 2 g by mouth 2 (two) times a day   Specialty Vitamins Products (MAGNESIUM, AMINO ACID CHELATE,) 133 MG tablet Take 133 mg by mouth       No current facility-administered medications for this visit  Allergies   Allergen Reactions    Aspirin GI Intolerance     nausea    Sulfa Antibiotics Rash       Review of Systems   Constitutional: Negative for chills, fatigue and fever  HENT: Positive for congestion, rhinorrhea and sore throat  Respiratory: Negative for cough, chest tightness and shortness of breath  Gastrointestinal: Negative for abdominal pain, diarrhea, nausea and vomiting  Musculoskeletal: Positive for myalgias  Neurological: Negative for headaches  Objective: There were no vitals filed for this visit  Physical Exam  VIRTUAL VISIT DISCLAIMER    Sam Rodriguez acknowledges that she has consented to an online visit or consultation  She understands that the online visit is based solely on information provided by her, and that, in the absence of a face-to-face physical evaluation by the physician, the diagnosis she receives is both limited and provisional in terms of accuracy and completeness  This is not intended to replace a full medical face-to-face evaluation by the physician  Sam Rodriguez understands and accepts these terms

## 2021-01-31 LAB — SARS-COV-2 RNA RESP QL NAA+PROBE: NEGATIVE

## 2021-02-06 ENCOUNTER — OFFICE VISIT (OUTPATIENT)
Dept: URGENT CARE | Facility: MEDICAL CENTER | Age: 66
End: 2021-02-06
Payer: COMMERCIAL

## 2021-02-06 VITALS — TEMPERATURE: 98.9 F | OXYGEN SATURATION: 97 % | HEART RATE: 78 BPM | RESPIRATION RATE: 18 BRPM

## 2021-02-06 DIAGNOSIS — B34.9 VIRAL SYNDROME: Primary | ICD-10-CM

## 2021-02-06 PROCEDURE — 0241U HB NFCT DS VIR RESP RNA 4 TRGT: CPT | Performed by: PHYSICIAN ASSISTANT

## 2021-02-06 PROCEDURE — 99203 OFFICE O/P NEW LOW 30 MIN: CPT | Performed by: PHYSICIAN ASSISTANT

## 2021-02-06 PROCEDURE — G0463 HOSPITAL OUTPT CLINIC VISIT: HCPCS | Performed by: PHYSICIAN ASSISTANT

## 2021-02-06 RX ORDER — MYCOPHENOLATE MOFETIL 500 MG/1
TABLET ORAL
COMMUNITY
Start: 2021-01-30

## 2021-02-06 NOTE — PATIENT INSTRUCTIONS
COVID-19 (Coronavirus Disease 2019)   WHAT YOU NEED TO KNOW:   COVID-19 is the disease caused by the novel (new) coronavirus first discovered in December 2019  Coronaviruses generally cause upper respiratory (nose, throat, and lung) infections, such as a cold  The new virus can also cause serious lower respiratory conditions, such as pneumonia or acute respiratory distress syndrome (ARDS)  Anyone can develop serious problems from the new virus, but your risk is higher if you are 65 or older  A weak immune system, diabetes, or a heart or lung condition can also increase your risk  DISCHARGE INSTRUCTIONS:   If you think you or someone you know may be infected:  Do the following to protect others:  · If emergency care is needed,  tell the  about the possible infection, or call ahead and tell the emergency department  · Call a healthcare provider  for instructions if symptoms are mild  Anyone who may be infected should not  arrive without calling first  The provider will need to protect staff members and other patients  · The person who may be infected needs to wear a face covering  while getting medical care  This will help lower the risk of infecting others  Coverings are not used for anyone who is younger than 2 years, has breathing problems, or cannot remove it  The provider can give you instructions for anyone who cannot wear a covering  Call your local emergency number (911 in the [address] [facility]) or go to the emergency department if:   · You have trouble breathing or shortness of breath at rest     · You have chest pain or pressure that lasts longer than 5 minutes  · You become confused or hard to wake  · Your lips or face are blue  · You have a fever of 104°F (40°C) or higher  Call your doctor if:   · You do not  have symptoms of COVID-19 but had close physical contact within 14 days with someone who tested positive  · You have questions or concerns about your condition or care      Medicines: You may need any of the following for mild symptoms:  · Decongestants  help reduce nasal congestion and help you breathe more easily  If you take decongestant pills, they may make you feel restless or cause problems with your sleep  Do not use decongestant sprays for more than a few days  · Cough suppressants  help reduce coughing  Ask your healthcare provider which type of cough medicine is best for you  · Acetaminophen  decreases pain and fever  It is available without a doctor's order  Ask how much to take and how often to take it  Follow directions  Read the labels of all other medicines you are using to see if they also contain acetaminophen, or ask your doctor or pharmacist  Acetaminophen can cause liver damage if not taken correctly  Do not use more than 4 grams (4,000 milligrams) total of acetaminophen in one day  · NSAIDs , such as ibuprofen, help decrease swelling, pain, and fever  NSAIDs can cause stomach bleeding or kidney problems in certain people  If you take blood thinner medicine, always ask your healthcare provider if NSAIDs are safe for you  Always read the medicine label and follow directions  · Take your medicine as directed  Contact your healthcare provider if you think your medicine is not helping or if you have side effects  Tell him or her if you are allergic to any medicine  Keep a list of the medicines, vitamins, and herbs you take  Include the amounts, and when and why you take them  Bring the list or the pill bottles to follow-up visits  Carry your medicine list with you in case of an emergency  How the 2019 coronavirus spreads: The virus spreads quickly and easily  You can become infected if you are in contact with a large amount of the virus, even for a short time  You can also become infected by being around a small amount of virus for a long time   The following are ways the virus is thought to spread, but more information may be coming:  · Droplets are the most common way all coronaviruses spread  The virus can travel in droplets that form when a person talks, coughs, or sneezes  Anyone who breathes in the droplets or gets them in his or her eyes can become infected with the virus  Close personal contact with an infected person is thought to be the main way the virus spreads  Close personal contact means you are within 6 feet (2 meters) of the person  · Person-to-person contact can spread the virus  For example, a person with the virus on his or her hands can spread it by shaking hands with someone  At this time, it does not appear that the virus can be passed to a baby during pregnancy or delivery  The baby can be infected after he or she is born through person-to-person contact  The virus also does not appear to spread in breast milk  If you are pregnant or breastfeeding, talk to your healthcare provider or obstetrician about any concerns you have  · The virus can stay on objects and surfaces  A person can get the virus on his or her hands by touching the object or surface  Infection happens if the person then touches his or her eyes or mouth with unwashed hands  It is not yet known how long the virus can stay on an object or surface  That is why it is important to clean all surfaces that are used regularly  · An infected animal may be able to infect a person who touches it  This may happen at live markets or on a farm  How everyone can lower the risk for COVID-19:  The best way to prevent infection is to avoid anyone who is infected, but this can be hard to do  An infected person can spread the virus before signs or symptoms begin, or even if signs or symptoms never develop  The following can help lower the risk for infection:      · Wash your hands often throughout the day  Use soap and water  Rub your soapy hands together, lacing your fingers  Wash the front and back of each hand, and in between your fingers   Use the fingers of one hand to scrub under the fingernails of the other hand  Wash for at least 20 seconds  Rinse with warm, running water for several seconds  Then dry your hands with a clean towel or paper towel  Use hand  that contains alcohol if soap and water are not available  Do not touch your eyes, nose, or mouth without washing your hands first  Teach children how to wash their hands and use hand   · Cover a sneeze or cough  This prevents droplets from traveling from you to others  Turn your face away and cover your mouth and nose with a tissue  Throw the tissue away  Use the bend of your arm if a tissue is not available  Then wash your hands well with soap and water or use hand   Turn and cover your face if you are around someone who is sneezing or coughing  Teach children how to cover a cough or sneeze  · Follow worldwide, national, and local social distancing guidelines  Social distancing means people avoid close physical contact so the virus cannot spread from one person to another  Keep at least 6 feet (2 meters) between you and others  Also keep this distance from anyone who comes to your home, such as someone making a delivery  · Make a habit of not touching your face  It is not known how long the virus can stay on objects and surfaces  If you get the virus on your hands, you can transfer it to your eyes, nose, or mouth and become infected  You can also transfer it to objects, surfaces, or people  Be aware of what you touch when you go out  Examples include handrails and elevator buttons  Try not to touch anything with bare hands unless it is necessary  Wash your hands before you leave your home and when you return  · Clean and disinfect high-touch surfaces and objects often  Use a disinfecting solution or wipes  You can make a solution by diluting 4 teaspoons of bleach in 1 quart (4 cups) of water   Clean and disinfect even if you think no one living in or coming to your home is infected with the virus  You can wipe items with a disinfecting cloth before you bring them into your home  Wash your hands after you handle anything you bring into your home  · Make your immune system as healthy as possible  A weakened immune system makes you more vulnerable to the new coronavirus  No COVID-19 vaccine is available yet  Vaccines such as the flu and pneumonia vaccines can help your immune system  Your healthcare provider can tell you which vaccines to get, and when to get them  Keep your immune system as strong as possible  Do not smoke  Eat healthy foods, exercise regularly, and try to manage stress  Go to bed and wake up at the same times each day  Social distancing guidelines:  National and local social distancing rules vary  Rules may change over time as restrictions are lifted  Restrictions may return if an outbreak happens where you live  It is important to know and follow all current social distancing rules in your area  The following are general guidelines:  · Limit trips out of your home  You may be able to have food, medicines, and other supplies delivered  If possible, have delivered items left at your door or other area  Try not to have someone hand you an item  You will be so close to the person that the virus can spread between you  · Do not have close physical contact with anyone who does not live in your home  Do not shake hands with, hug, or kiss a person as a greeting  Stand or walk as far from others as possible  If you must use public transportation (such as a bus or subway), try to sit or stand away from others  You can stay safely connected with others through phone calls, e-mail messages, social media websites, and video chats  Check in on anyone who may be having a hard time socially distancing, or who lives alone  Ask administrators at nursing homes or long-term care facilities how you can safely communicate with someone living there      · Wear a cloth face covering around others who do not live in your home  Face coverings help prevent the virus from spreading to others in droplets  You can use a clear face covering if someone needs to read your lips  This is a cloth covering that has plastic over the mouth area so your lips can be seen  Do not use coverings that have breathing valves or vents  The virus can travel out of the valve or vent and be spread to others  Do not take your covering off to talk, cough, or sneeze  Do not use coverings on children younger than 2 years or on anyone who has breathing problems or cannot remove it  · Only allow medical or other necessary professionals into your home  Wear your face covering, and remind professionals to wear a face covering  Remind them to wash their hands when they arrive and before they leave  Do not  let anyone who does not live in your home in, even if the person is not sick  A person can pass the virus to others before symptoms of COVID-19 begin  Some people never even develop symptoms  Children commonly have mild symptoms or no symptoms  It may be hard to tell a child not to hug or kiss you  Explain that this is how he or she can help you stay healthy  · Do not go to someone else's home unless it is necessary  Do not go over to visit, even if the person is lonely  Only go if you need to help him or her  Make sure you both wear face coverings while you are there  · Avoid large gatherings and crowds  Gatherings or crowds of 10 or more individuals can cause the virus to spread  Examples of gatherings include parties, sporting events, Vood[employer] services, and conferences  Crowds may form at beaches, corona, and tourist attractions  Protect yourself by staying away from large gatherings and crowds  · Ask your healthcare provider for other ways to have appointments  You may be able to have appointments without having to go into the provider's office  Some providers offer phone, video, or other types of appointments  You may also be able to get prescriptions for a few months of your medicines at a time  · Stay safe if you must go out to work  You may have a job that can only be done outside your home  Keep physical distance between you and other workers as much as possible  Follow your employer's rules so everyone stays safe  If you have COVID-19 and are recovering at home:  Healthcare providers will give you specific instructions to follow  The following are general guidelines to remind you how to keep others safe until you are well:  · Wash your hands often  Use soap and water as much as possible  You can use hand  that contains alcohol if soap and water are not available  Do not share towels with anyone  If you use paper towels, throw them away in a lined trash can kept in your room or area  Use a covered trash can, if possible  · Do not go out of your home unless it is necessary  You may have to go to your healthcare provider's office for check-ups or to get prescription refills  Do not arrive at the provider's office without an appointment  Providers have to make their offices safe for staff and other patients  · Do not have close physical contact with anyone unless it is necessary  Only have close physical contact with a person giving direct care, or a baby or child you must care for  Family members and friends should not visit you  If possible, stay in a separate area or room of your home if you live with others  No one should go into the area or room except to give you care  You can visit with others by phone, video chat, e-mail, or similar systems  It is important to stay connected with others in your life while you recover  · Wear a face covering while others are near you  This can help prevent droplets from spreading the virus when you talk, sneeze, or cough  Put the covering on before anyone comes into your room or area   Remind the person to cover his or her nose and mouth before going in to provide care for you  · Do not share items  Do not share dishes, towels, or other items with anyone  Items need to be washed after you use them  · Protect your baby  Wash your hands with soap and water often throughout the day  Wear a clean face covering while you breastfeed, or while you express or pump breast milk  If possible, ask someone who is well to care for your baby  You can put breast milk in bottles for the person to use, if needed  Talk to your healthcare provider if you have any questions or concerns about caring for or bonding with your baby  He or she will tell you when to bring your baby in for check-ups and vaccines  He or she will also tell you what to do if you think your baby was infected with the new virus  · Do not handle live animals  Until more is known, it is best not to touch, play with, or handle live animals  Some animals, including pets, have been infected with the new coronavirus  Do not handle or care for animals until you are well  Care includes feeding, petting, and cuddling your pet  Do not let your pet lick you or share your food  Ask someone who is not infected to take care of your pet, if possible  If you must care for a pet, wear a face covering  Wash your hands before and after you give care  · Follow directions from your healthcare provider for being around others after you recover  You will need to wait at least 10 days after symptoms first appeared  Then you will need to have no fever for 24 hours without fever medicine, and no other symptoms  A loss of taste or smell may continue for several months  It is considered okay to be around others if this is your only symptom  It is not known for sure if or for how long a recovered person can pass the virus to others  Your provider may give you instructions, such as continuing social distancing or wearing a face covering around others      How to take care of someone who has COVID-19:  If the person lives in another home, arrange for a time to give care  Remember to bring a few pairs of disposable gloves and a cloth face covering  The following are general guidelines to help you safely care for anyone who has COVID-19:  · Wash your hands often  Wash before and after you go into the person's home, area, or room  Throw paper towels away in a lined trash can that has a lid, if possible  · Do not allow others to go near the person  No one should come into the person's home unless it is necessary  If possible, the person should be in a separate area or room if he or she lives with others  Keep the room's door shut unless you need to go in or out  Have others call, video chat, or e-mail the person if he or she is feeling well enough  The person may feel lonely if he or she is kept separate for a long period of time  Safe communication can help him or her stay connected to family and friends  · Make sure the person's room has good air flow  You may be able to open the window if the weather allows  An air conditioner can also be turned on to help air move  · Contact the person before you go in to give care  Make sure the person is wearing a face covering  Remind him or her to wash his or her hands with soap and water  He or she can use hand  that contains alcohol if soap and water are not available  Put on a face covering before you go in to give care  · Wear gloves while you give care and clean  Clean items the person uses often  Clean countertops, cooking surfaces, and the fronts and insides of the microwave and refrigerator  Clean the shower, toilet, the area around the toilet, the sink, the area around the sink, and faucets  Gather used laundry or bedding  Wash and dry items on the warmest settings the fabric allows  Wash dishes and silverware in hot, soapy water or in a   · Anything you throw away needs to go into a lined trash can    When you need to empty the trash, close the open end of the lining and tie it closed  This helps prevent items the virus is on from spilling out of the trash  Remove your gloves and throw them away  Wash your hands  Follow up with your doctor as directed:  Write down your questions so you remember to ask them during your visits  For more information:   · Centers for Disease Control and Prevention  [address] , [address]  Phone: 8- 725 - [phone]  Web Address: [employer] com br    © Copyright [address] [place] [address] Drive Information is for End User's use only and may not be sold, redistributed or otherwise used for commercial purposes  All illustrations and images included in CareNotes® are the copyrighted property of A D A M , Inc  or [facility] [employer] [employer]   The above information is an  only  It is not intended as medical advice for individual conditions or treatments  Talk to your doctor, nurse or pharmacist before following any medical regimen to see if it is safe and effective for you

## 2021-02-07 ENCOUNTER — NURSE TRIAGE (OUTPATIENT)
Dept: OTHER | Facility: OTHER | Age: 66
End: 2021-02-07

## 2021-02-07 LAB
FLUAV RNA RESP QL NAA+PROBE: NEGATIVE
FLUBV RNA RESP QL NAA+PROBE: NEGATIVE
RSV RNA RESP QL NAA+PROBE: NEGATIVE
SARS-COV-2 RNA RESP QL NAA+PROBE: POSITIVE

## 2021-02-07 NOTE — TELEPHONE ENCOUNTER
Regarding: COVID/SYMPTOMATIC,/COUGH  ----- Message from Precious De Guzman sent at 2/7/2021  7:46 AM EST -----  "My mom has tested positive for covid  She has a lung condition  What is the next step?

## 2021-02-07 NOTE — TELEPHONE ENCOUNTER
TC to oncall- Pt calling in regarding Covid Positive results 2/6  Pt has had symptoms since 2/3 currently has nasal congestion, cough and headaches  Pt states some SOB on exertion but is at her baseline due to sjogrens and ILD  Pt is taking the vit c, d and zinc as directed by Dr Briana Barfield  No current use of rescue inhaler or nebulizer  Pt is asking if she qualify for Monoclonal infusion? Any further instructions? TC back from Dr Emily Alvaradoks she does qualify  She should call our office tomorrow at 8am to set up video visit to discuss and arrange it    Virtual appt  Made for pt  Pt is aware  Were you within 6 feet or less, for up to 15 minutes or more with a person that has a confirmed COVID-19 test?   Positive test results 2/6    Are you experiencing any symptoms attributed to the virus?  (Assess for SOB, cough, fever, difficulty breathing)  SOB-When going up stairs, nasal congestion and cough  Headaches   Denies Fevers    HIGH RISK: Do you have any history heart or lung conditions, weakened immune system, diabetes, Asthma, CHF, HIV, COPD, Chemo, renal failure, sickle cell, etc? Lung conditions, sjogens, ILD          Reason for Disposition   [1] HIGH RISK patient (e g , age > 59 years, diabetes, heart or lung disease, weak immune system) AND [2] new or worsening symptoms    Additional Information   Negative: MILD difficulty breathing (e g , minimal/no SOB at rest, SOB with walking, pulse <100)    Protocols used: CORONAVIRUS (COVID-19) DIAGNOSED OR SUSPECTED-ADULT-

## 2021-02-08 ENCOUNTER — TELEMEDICINE (OUTPATIENT)
Dept: FAMILY MEDICINE CLINIC | Facility: CLINIC | Age: 66
End: 2021-02-08
Payer: COMMERCIAL

## 2021-02-08 VITALS — TEMPERATURE: 98.4 F | OXYGEN SATURATION: 98 %

## 2021-02-08 DIAGNOSIS — U07.1 COVID-19 VIRUS INFECTION: Primary | ICD-10-CM

## 2021-02-08 PROCEDURE — 99212 OFFICE O/P EST SF 10 MIN: CPT | Performed by: FAMILY MEDICINE

## 2021-02-08 RX ORDER — PREDNISONE 10 MG/1
TABLET ORAL
Qty: 15 TABLET | Refills: 0 | Status: SHIPPED | OUTPATIENT
Start: 2021-02-08 | End: 2021-07-07

## 2021-02-08 NOTE — PROGRESS NOTES
COVID-19 Virtual Visit     Assessment/Plan:    Problem List Items Addressed This Visit     None      Visit Diagnoses     COVID-19 virus infection    -  Primary         Disposition:     I recommended continued isolation until at least 24 hours have passed since recovery defined as resolution of fever without the use of fever-reducing medications AND improvement in COVID symptoms AND 10 days have passed since onset of symptoms (or 10 days have passed since date of first positive viral diagnostic test for asymptomatic patients)  I will add prednisone to her medication right now  Continue vitamin-C, vitamin-D, and zinc   Recheck by video in 24 hours  I have spent 12 minutes directly with the patient  Greater than 50% of this time was spent in counseling/coordination of care regarding: risks and benefits of treatment options, instructions for management, patient and family education, importance of treatment compliance, risk factor reductions and impressions  Encounter provider Luigi Noland DO    Provider located at [address] [place] [address] Road [address]    Recent Visits  No visits were found meeting these conditions  Showing recent visits within past 7 days and meeting all other requirements     Today's Visits  Date Type Provider Dept   02/08/21 Telemedicine Luigi Noland DO  [facility] N[facility]   Showing today's visits and meeting all other requirements     Future Appointments  No visits were found meeting these conditions  Showing future appointments within next 150 days and meeting all other requirements        Patient agrees to participate in a virtual check in via telephone or video visit instead of presenting to the office to address urgent/immediate medical needs  Patient is aware this is a billable service  After connecting through [facility], the patient was identified by name and date of birth   Familia Tavo was informed that this was a telemedicine visit and that the exam was being conducted confidentially over secure lines  Lavaun Duverney acknowledged consent and understanding of privacy and security of the telemedicine visit  I informed the patient that I have reviewed her record in Epic and presented the opportunity for her to ask any questions regarding the visit today  The patient agreed to participate  Subjective:   Lavaun Duverney is a 77 y o  female who has been screened for COVID-19  Symptom change since last report: worsening  Patient's symptoms include nasal congestion, rhinorrhea, sore throat, anosmia and cough (worse)  Patient denies fever, chills, fatigue, malaise, loss of taste, shortness of breath, chest tightness, nausea, vomiting, diarrhea, myalgias and headaches  [facility] has been staying home and has isolated themselves in her home  She is taking care to not share personal items and is cleaning all surfaces that are touched often, like counters, tabletops, and doorknobs using household cleaning sprays or wipes  She is wearing a mask when she leaves her room  Date of symptom onset: 1/29/2021  Date of exposure: 1/24/2021  Date of positive COVID-19 PCR: 2/6/2021     Cough seems a little worse      Lab Results   Component Value Date    SARSCOV2 Positive (A) 02/06/2021    SARSCOV2 Not Detected 11/23/2020    SARSCOV2 Not Detected 10/30/2020     Past Medical History:   Diagnosis Date    Allergic     Allergic rhinitis     [id number]  RESOLVED    Candidiasis of breast     [id number] RESOLVED    Diverticulosis     Hemorrhoids     Lung nodule     Pneumonia     PONV (postoperative nausea and vomiting)     [facility] [address] [facility]     Sjogren's syndrome ([facility] [address] [facility]     Followed by rheumatology - Dr Vijaya Hurst     Past Surgical History:   Procedure Laterality Date    BREAST LUMPECTOMY Left     COLONOSCOPY      PA BRONCHOSCOPY,DIAGNOSTIC N/A 2/8/2017    Procedure: Joyce Samuels;  Surgeon: Jairon Soriano MD;  [facility]; Service: Pulmonary    VA COLONOSCOPY FLX DX W/COLLJ SPEC WHEN PFRMD N/A 8/16/2016    Procedure: COLONOSCOPY;  Surgeon: Mikki Tomas MD;  [facility]; Service: General    UMBILICAL HERNIA REPAIR       Current Outpatient Medications   Medication Sig Dispense Refill    albuterol (PROAIR HFA) 90 mcg/act inhaler Inhale 2 puffs 4 (four) times a day as needed      Biotin 1 MG CAPS Take by mouth      brompheniramine-pseudoephedrine-DM 30-2-10 MG/5ML syrup Take 5 mL by mouth 4 (four) times a day as needed for congestion or cough 240 mL 0    Chromium Picolinate (CHROMIUM PICOLATE PO) Take by mouth daily   COD LIVER OIL PO Take by mouth daily   desloratadine (CLARINEX) 5 MG tablet Take 1 tablet (5 mg total) by mouth daily 30 tablet 5    folic acid (FOLVITE) 1 mg tablet Take 1 mg by mouth daily   ibuprofen (MOTRIN) 600 mg tablet Take 600 mg by mouth every 6 (six) hours as needed for mild pain   montelukast (SINGULAIR) 10 mg tablet Take 1 tablet (10 mg total) by mouth daily at bedtime 90 tablet 0    Multiple Minerals-Vitamins (CALCIUM-MAGNESIUM-ZINC-D3) TABS Take by mouth daily   Multiple Vitamins-Minerals (MULTIVITAMIN WITH MINERALS) tablet Take 1 tablet by mouth daily   mycophenolate (CELLCEPT) 500 mg tablet TAKE THREE TABLETS BY MOUTH EVERY DAY IN THE MORNING AND TAKE TWO TABLETS BY MOUTH EVERY DAY AT BEDTIME      olopatadine (PATANOL) 0 1 % ophthalmic solution Administer 1 drop to both eyes 2 (two) times a day 5 mL 2    omega-3-acid ethyl esters (LOVAZA) 1 g capsule Take 2 g by mouth 2 (two) times a day   Specialty Vitamins Products (MAGNESIUM, AMINO ACID CHELATE,) 133 MG tablet Take 133 mg by mouth       No current facility-administered medications for this visit        Allergies   Allergen Reactions    Aspirin GI Intolerance     nausea    Sulfa Antibiotics Rash       Review of Systems   Constitutional: Negative for activity change, appetite change, chills, fatigue and fever  HENT: Positive for congestion, rhinorrhea and sore throat  Respiratory: Positive for cough (worse)  Negative for chest tightness and shortness of breath  Gastrointestinal: Negative for diarrhea, nausea and vomiting  Musculoskeletal: Negative for myalgias  Neurological: Negative for headaches  Objective:    Vitals:    02/08/21 0856   Temp: 98 4 °F (36 9 °C)   SpO2: 98%       Physical Exam  Vitals signs reviewed  Constitutional:       Appearance: She is well-developed  HENT:      Head: Normocephalic and atraumatic  Eyes:      Conjunctiva/sclera: Conjunctivae normal    Pulmonary:      Effort: Pulmonary effort is normal    Neurological:      Mental Status: She is alert and oriented to person, place, and time  Psychiatric:         Judgment: Judgment normal        VIRTUAL VISIT DISCLAIMER    Kirti Ganesh acknowledges that she has consented to an online visit or consultation  She understands that the online visit is based solely on information provided by her, and that, in the absence of a face-to-face physical evaluation by the physician, the diagnosis she receives is both limited and provisional in terms of accuracy and completeness  This is not intended to replace a full medical face-to-face evaluation by the physician  Kirti Andersen understands and accepts these terms

## 2021-02-09 ENCOUNTER — TELEMEDICINE (OUTPATIENT)
Dept: FAMILY MEDICINE CLINIC | Facility: CLINIC | Age: 66
End: 2021-02-09
Payer: COMMERCIAL

## 2021-02-09 VITALS — TEMPERATURE: 99.1 F | OXYGEN SATURATION: 99 %

## 2021-02-09 DIAGNOSIS — U07.1 COVID-19 VIRUS INFECTION: Primary | ICD-10-CM

## 2021-02-09 PROCEDURE — 99212 OFFICE O/P EST SF 10 MIN: CPT | Performed by: FAMILY MEDICINE

## 2021-02-09 NOTE — PROGRESS NOTES
COVID-19 Virtual Visit     Assessment/Plan:    Problem List Items Addressed This Visit     None         Disposition:       Continue prednisone, start using albuterol 4 times a day  Continue vitamin-C, vitamin-D, and zinc   Will re-evaluate in 24 hours  Told the patient to keep track of her pulse ox, if it drops less than 93% she is to call  I have spent 10 minutes directly with the patient  Greater than 50% of this time was spent in counseling/coordination of care regarding: diagnostic results, prognosis, risks and benefits of treatment options, instructions for management, patient and family education, importance of treatment compliance, risk factor reductions and impressions  Encounter provider Clare Camacho DO    Provider located at [facility] [id number]    Recent Visits  Date Type Provider Dept   02/08/21 Telemedicine DO Andrea Hackett   Showing recent visits within past 7 days and meeting all other requirements     Today's Visits  Date Type Provider Dept   02/09/21 802 [address], DO Andrea Nava   Showing today's visits and meeting all other requirements     Future Appointments  No visits were found meeting these conditions  Showing future appointments within next 150 days and meeting all other requirements      This virtual check-in was done via [employer] Duo and patient was informed that this is not a secure, HIPAA-compliant platform  She agrees to proceed  Patient agrees to participate in a virtual check in via telephone or video visit instead of presenting to the office to address urgent/immediate medical needs  Patient is aware this is a billable service  After connecting through [facility], the patient was identified by name and date of birth  Donna Wheatley was informed that this was a telemedicine visit and that the exam was being conducted confidentially over secure lines   Donna Wheatley acknowledged consent and understanding of privacy and security of the telemedicine visit  I informed the patient that I have reviewed her record in Epic and presented the opportunity for her to ask any questions regarding the visit today  The patient agreed to participate  Subjective:   Dinesh Pettit is a 77 y o  female who has been screened for COVID-19  Symptom change since last report: worsening  Patient's symptoms include fatigue (A little improved), malaise (A little improved), anosmia (intermittenmt loss), cough (worse), nausea, myalgias and headache  Patient denies fever, chills, congestion, rhinorrhea, sore throat, loss of taste, shortness of breath, chest tightness, abdominal pain, vomiting and diarrhea  [place] has been staying home and has isolated themselves in her home  She is taking care to not share personal items and is cleaning all surfaces that are touched often, like counters, tabletops, and doorknobs using household cleaning sprays or wipes  She is wearing a mask when she leaves her room  Date of symptom onset: 1/29/2021  Date of exposure: 1/24/2021  Date of positive COVID-19 PCR: 2/6/2021      Just started prednisone late last night  Overall, feels a little worse  Was not nauseous the other day, now having more nausea  Cough seems to be a little worse as well      Lab Results   Component Value Date    SARSCOV2 Positive (A) 02/06/2021    SARSCOV2 Not Detected 11/23/2020    [facility] Not Detected 10/30/2020     Past Medical History:   Diagnosis Date    Allergic     Allergic rhinitis     [id number]  RESOLVED    Candidiasis of breast     [id number] RESOLVED    Diverticulosis     Hemorrhoids     Lung nodule     Pneumonia     PONV (postoperative nausea and vomiting)     [facility] [address] [facility]     Sjogren's syndrome ([facility] [address] )     Followed by rheumatology - Dr Caprice Boast     Past Surgical History:   Procedure Laterality Date    BREAST LUMPECTOMY Left     COLONOSCOPY      PA BRONCHOSCOPY,DIAGNOSTIC N/A 2/8/2017    Procedure: Katiana Pinta;  Surgeon: Sabiha Poole MD;  [facility]; Service: Pulmonary    MA COLONOSCOPY FLX DX W/COLLJ SPEC WHEN PFRMD N/A 8/16/2016    Procedure: COLONOSCOPY;  Surgeon: Henny Mathew MD;  [facility]; Service: General    UMBILICAL HERNIA REPAIR       Current Outpatient Medications   Medication Sig Dispense Refill    albuterol (PROAIR HFA) 90 mcg/act inhaler Inhale 2 puffs 4 (four) times a day as needed      Biotin 1 MG CAPS Take by mouth      brompheniramine-pseudoephedrine-DM 30-2-10 MG/5ML syrup Take 5 mL by mouth 4 (four) times a day as needed for congestion or cough 240 mL 0    Chromium Picolinate (CHROMIUM PICOLATE PO) Take by mouth daily   COD LIVER OIL PO Take by mouth daily   desloratadine (CLARINEX) 5 MG tablet Take 1 tablet (5 mg total) by mouth daily 30 tablet 5    folic acid (FOLVITE) 1 mg tablet Take 1 mg by mouth daily   ibuprofen (MOTRIN) 600 mg tablet Take 600 mg by mouth every 6 (six) hours as needed for mild pain   montelukast (SINGULAIR) 10 mg tablet Take 1 tablet (10 mg total) by mouth daily at bedtime 90 tablet 0    Multiple Minerals-Vitamins (CALCIUM-MAGNESIUM-ZINC-D3) TABS Take by mouth daily   Multiple Vitamins-Minerals (MULTIVITAMIN WITH MINERALS) tablet Take 1 tablet by mouth daily   mycophenolate (CELLCEPT) 500 mg tablet TAKE THREE TABLETS BY MOUTH EVERY DAY IN THE MORNING AND TAKE TWO TABLETS BY MOUTH EVERY DAY AT BEDTIME      olopatadine (PATANOL) 0 1 % ophthalmic solution Administer 1 drop to both eyes 2 (two) times a day 5 mL 2    omega-3-acid ethyl esters (LOVAZA) 1 g capsule Take 2 g by mouth 2 (two) times a day        predniSONE 10 mg tablet 5 x 1 day, 4 x1 day, 3 x 1 day,2 x1 day,  1 x 1 day 15 tablet 0    Specialty Vitamins Products (MAGNESIUM, AMINO ACID CHELATE,) 133 MG tablet Take 133 mg by mouth       No current facility-administered medications for this visit  Allergies   Allergen Reactions    Aspirin GI Intolerance     nausea    Sulfa Antibiotics Rash       Review of Systems   Constitutional: Positive for activity change and fatigue (A little improved)  Negative for appetite change, chills and fever  HENT: Negative for congestion, rhinorrhea and sore throat  Respiratory: Positive for cough (worse)  Negative for chest tightness and shortness of breath  Gastrointestinal: Positive for nausea  Negative for abdominal pain, diarrhea and vomiting  Musculoskeletal: Positive for myalgias  Neurological: Positive for headaches  Objective:    Vitals:    02/09/21 0830   Temp: 99 1 °F (37 3 °C)   SpO2: 99%       Physical Exam  Vitals signs reviewed  Constitutional:       Appearance: She is well-developed  She is ill-appearing  HENT:      Head: Normocephalic and atraumatic  Eyes:      Conjunctiva/sclera: Conjunctivae normal    Pulmonary:      Effort: Pulmonary effort is normal    Neurological:      Mental Status: She is alert and oriented to person, place, and time  Psychiatric:         Judgment: Judgment normal        VIRTUAL VISIT DISCLAIMER    Kal Bridges acknowledges that she has consented to an online visit or consultation  She understands that the online visit is based solely on information provided by her, and that, in the absence of a face-to-face physical evaluation by the physician, the diagnosis she receives is both limited and provisional in terms of accuracy and completeness  This is not intended to replace a full medical face-to-face evaluation by the physician  Kal Bridges understands and accepts these terms

## 2021-02-10 ENCOUNTER — TELEMEDICINE (OUTPATIENT)
Dept: FAMILY MEDICINE CLINIC | Facility: CLINIC | Age: 66
End: 2021-02-10
Payer: COMMERCIAL

## 2021-02-10 DIAGNOSIS — U07.1 COVID-19 VIRUS INFECTION: Primary | ICD-10-CM

## 2021-02-10 PROCEDURE — 99212 OFFICE O/P EST SF 10 MIN: CPT | Performed by: FAMILY MEDICINE

## 2021-02-10 NOTE — PROGRESS NOTES
COVID-19 Virtual Visit     Assessment/Plan:    Problem List Items Addressed This Visit     None         Disposition: At this point, the patient has quarantine for 10 days  Her symptoms are almost resolved  She would be considered safe to   Be in public  She should continue to wear her mask, avoid sick contacts  Call if symptoms return  She should schedule COVID vaccination in 90 days  I have spent 8 minutes directly with the patient  Greater than 50% of this time was spent in counseling/coordination of care regarding: instructions for management, patient and family education and impressions  Encounter provider Mary Beth Zuluaga DO    Provider located at [address] [place] [id number]    Recent Visits  Date Type Provider Dept   02/09/21 [address] [place] [address], DO [facility]   02/08/21 Telemedicine Mary Beth Zuluaga DO [facility]   Showing recent visits within past 7 days and meeting all other requirements     Today's Visits  Date Type Provider Dept   02/10/21 [address] [place] [address], DO [facility]   Showing today's visits and meeting all other requirements     Future Appointments  No visits were found meeting these conditions  Showing future appointments within next 150 days and meeting all other requirements      This virtual check-in was done via [employer] Duo and patient was informed that this is not a secure, HIPAA-compliant platform  She agrees to proceed  Patient agrees to participate in a virtual check in via telephone or video visit instead of presenting to the office to address urgent/immediate medical needs  Patient is aware this is a billable service  After connecting through [facility], the patient was identified by name and date of birth  Kal Nip was informed that this was a telemedicine visit and that the exam was being conducted confidentially over secure lines  My office door was closed   No one else was in the room  Janet Kendall acknowledged consent and understanding of privacy and security of the telemedicine visit  I informed the patient that I have reviewed her record in Epic and presented the opportunity for her to ask any questions regarding the visit today  The patient agreed to participate  Subjective:   Janet Kendall is a 77 y o  female who has been screened for COVID-19  Symptom change since last report: resolving  Patient's symptoms include nasal congestion (But improved) and rhinorrhea (But improved)  Patient denies fever, chills, fatigue, malaise, sore throat, anosmia, loss of taste, cough, shortness of breath, chest tightness, abdominal pain, nausea, diarrhea, myalgias and headaches  [facility] Son has been staying home and has isolated themselves in her home  She is taking care to not share personal items and is cleaning all surfaces that are touched often, like counters, tabletops, and doorknobs using household cleaning sprays or wipes  She is wearing a mask when she leaves her room  Date of symptom onset: 1/29/2021  Date of exposure: 1/24/2021  Date of positive COVID-19 PCR: 2/6/2021     Patient feels much improved, sense of smell sense of taste seem to come and go but overall much improved      Lab Results   Component Value Date    SARSCOV2 Positive (A) 02/06/2021    SARSCOV2 Not Detected 11/23/2020    SARSCOV2 Not Detected 10/30/2020     Past Medical History:   Diagnosis Date    Allergic     Allergic rhinitis     [id number]  RESOLVED    Candidiasis of breast     [id number] RESOLVED    Diverticulosis     Hemorrhoids     Lung nodule     Pneumonia     PONV (postoperative nausea and vomiting)     [facility] ([facility] [address]     Sjogren's syndrome ([facility] [address] [facility]     Followed by rheumatology - Dr Cherelle Pak     Past Surgical History:   Procedure Laterality Date    BREAST LUMPECTOMY Left     COLONOSCOPY      [place] BRONCHOSCOPY,DIAGNOSTIC N/A 2/8/2017    Procedure: BRONCHOSCOPY FLEXIBLE;  Surgeon: Selena Hyde MD;  [facility]; Service: Pulmonary    ND COLONOSCOPY FLX DX W/COLLJ SPEC WHEN PFRMD N/A 8/16/2016    Procedure: COLONOSCOPY;  Surgeon: Lisa Lobato MD;  [facility]; Service: General    UMBILICAL HERNIA REPAIR       Current Outpatient Medications   Medication Sig Dispense Refill    albuterol (PROAIR HFA) 90 mcg/act inhaler Inhale 2 puffs 4 (four) times a day as needed      Biotin 1 MG CAPS Take by mouth      brompheniramine-pseudoephedrine-DM 30-2-10 MG/5ML syrup Take 5 mL by mouth 4 (four) times a day as needed for congestion or cough 240 mL 0    Chromium Picolinate (CHROMIUM PICOLATE PO) Take by mouth daily   COD LIVER OIL PO Take by mouth daily   desloratadine (CLARINEX) 5 MG tablet Take 1 tablet (5 mg total) by mouth daily 30 tablet 5    folic acid (FOLVITE) 1 mg tablet Take 1 mg by mouth daily   ibuprofen (MOTRIN) 600 mg tablet Take 600 mg by mouth every 6 (six) hours as needed for mild pain   montelukast (SINGULAIR) 10 mg tablet Take 1 tablet (10 mg total) by mouth daily at bedtime 90 tablet 0    Multiple Minerals-Vitamins (CALCIUM-MAGNESIUM-ZINC-D3) TABS Take by mouth daily   Multiple Vitamins-Minerals (MULTIVITAMIN WITH MINERALS) tablet Take 1 tablet by mouth daily   mycophenolate (CELLCEPT) 500 mg tablet TAKE THREE TABLETS BY MOUTH EVERY DAY IN THE MORNING AND TAKE TWO TABLETS BY MOUTH EVERY DAY AT BEDTIME      olopatadine (PATANOL) 0 1 % ophthalmic solution Administer 1 drop to both eyes 2 (two) times a day 5 mL 2    omega-3-acid ethyl esters (LOVAZA) 1 g capsule Take 2 g by mouth 2 (two) times a day   predniSONE 10 mg tablet 5 x 1 day, 4 x1 day, 3 x 1 day,2 x1 day,  1 x 1 day 15 tablet 0    Specialty Vitamins Products (MAGNESIUM, AMINO ACID CHELATE,) 133 MG tablet Take 133 mg by mouth       No current facility-administered medications for this visit        Allergies   Allergen Reactions    Aspirin GI Intolerance     nausea    Sulfa Antibiotics Rash       Review of Systems   Constitutional: Negative for chills, fatigue and fever  HENT: Positive for congestion (But improved) and rhinorrhea (But improved)  Negative for sore throat  Respiratory: Negative for cough, chest tightness and shortness of breath  Gastrointestinal: Negative for abdominal pain, diarrhea and nausea  Musculoskeletal: Negative for myalgias  Neurological: Negative for headaches  Objective: There were no vitals filed for this visit  Physical Exam  Constitutional:       Appearance: She is well-developed  HENT:      Head: Normocephalic and atraumatic  Eyes:      Conjunctiva/sclera: Conjunctivae normal    Pulmonary:      Effort: Pulmonary effort is normal    Neurological:      Mental Status: She is alert and oriented to person, place, and time  Psychiatric:         Judgment: Judgment normal        VIRTUAL VISIT DISCLAIMER    Diego Braxton acknowledges that she has consented to an online visit or consultation  She understands that the online visit is based solely on information provided by her, and that, in the absence of a face-to-face physical evaluation by the physician, the diagnosis she receives is both limited and provisional in terms of accuracy and completeness  This is not intended to replace a full medical face-to-face evaluation by the physician  Diego Braxton understands and accepts these terms

## 2021-02-10 NOTE — ASSESSMENT & PLAN NOTE
Resolving, finish up vitamin-C vitamin-D and zinc for another 2 weeks  Keep regular scheduled appointment

## 2021-02-17 ENCOUNTER — TELEPHONE (OUTPATIENT)
Dept: FAMILY MEDICINE CLINIC | Facility: CLINIC | Age: 66
End: 2021-02-17

## 2021-03-25 ENCOUNTER — APPOINTMENT (OUTPATIENT)
Dept: RADIOLOGY | Facility: MEDICAL CENTER | Age: 66
End: 2021-03-25
Payer: COMMERCIAL

## 2021-03-25 ENCOUNTER — OFFICE VISIT (OUTPATIENT)
Dept: FAMILY MEDICINE CLINIC | Facility: CLINIC | Age: 66
End: 2021-03-25
Payer: COMMERCIAL

## 2021-03-25 VITALS — SYSTOLIC BLOOD PRESSURE: 118 MMHG | TEMPERATURE: 97.9 F | DIASTOLIC BLOOD PRESSURE: 78 MMHG

## 2021-03-25 DIAGNOSIS — M25.571 ACUTE RIGHT ANKLE PAIN: ICD-10-CM

## 2021-03-25 DIAGNOSIS — M25.571 ACUTE RIGHT ANKLE PAIN: Primary | ICD-10-CM

## 2021-03-25 PROBLEM — U07.1 COVID-19 VIRUS INFECTION: Status: RESOLVED | Noted: 2021-02-09 | Resolved: 2021-03-25

## 2021-03-25 PROCEDURE — 1160F RVW MEDS BY RX/DR IN RCRD: CPT | Performed by: FAMILY MEDICINE

## 2021-03-25 PROCEDURE — 99213 OFFICE O/P EST LOW 20 MIN: CPT | Performed by: FAMILY MEDICINE

## 2021-03-25 PROCEDURE — 1036F TOBACCO NON-USER: CPT | Performed by: FAMILY MEDICINE

## 2021-03-25 PROCEDURE — 73610 X-RAY EXAM OF ANKLE: CPT

## 2021-03-25 RX ORDER — NAPROXEN 500 MG/1
500 TABLET ORAL 2 TIMES DAILY WITH MEALS
Qty: 60 TABLET | Refills: 5 | Status: SHIPPED | OUTPATIENT
Start: 2021-03-25

## 2021-03-25 NOTE — ASSESSMENT & PLAN NOTE
Will obtain x-ray, naproxen twice daily  I see area 3 times a day for 20 min  Will contact her once we have her port

## 2021-03-25 NOTE — PROGRESS NOTES
Assessment/Plan:    No problem-specific Assessment & Plan notes found for this encounter  Diagnoses and all orders for this visit:    Acute right ankle pain  -     naproxen (NAPROSYN) 500 mg tablet; Take 1 tablet (500 mg total) by mouth 2 (two) times a day with meals  -     Cancel: XR ankle 2 vw right; Future          Subjective:   Chief Complaint   Patient presents with    Foot Injury     pt states she fell   right foot pain  x 1 day           Patient ID: Kirti Andersen is a 77 y o  female  She slipped on the steps yesterday, in the rain  Rolled her right ankle felt a sharp pain along the lateral aspect of her ankle  She did ice it and took some ibuprofen, but she has quite a bit of pain and does not feel like she can bear weight  The following portions of the patient's history were reviewed and updated as appropriate: allergies, current medications, past family history, past medical history, past social history, past surgical history and problem list     Review of Systems   Constitutional: Positive for activity change  Negative for appetite change and fatigue  HENT: Negative  Eyes: Negative  Respiratory: Negative  Cardiovascular: Negative  Gastrointestinal: Negative  Endocrine: Negative  Genitourinary: Negative  Musculoskeletal: Positive for arthralgias and joint swelling  Skin: Negative  Allergic/Immunologic: Negative for immunocompromised state  Neurological: Negative  Hematological: Negative  Psychiatric/Behavioral: Negative  Objective:      /78   Temp 97 9 °F (36 6 °C)          Physical Exam  Constitutional:       General: She is not in acute distress  Appearance: She is well-developed  HENT:      Head: Normocephalic and atraumatic        Right Ear: Tympanic membrane, ear canal and external ear normal       Left Ear: Tympanic membrane, ear canal and external ear normal       Nose: Nose normal    Eyes:      Extraocular Movements: Extraocular movements intact  Conjunctiva/sclera: Conjunctivae normal    Neck:      Musculoskeletal: Normal range of motion and neck supple  Thyroid: No thyromegaly  Vascular: No JVD  Trachea: No tracheal deviation  Cardiovascular:      Rate and Rhythm: Normal rate and regular rhythm  Heart sounds: Normal heart sounds  No murmur  Pulmonary:      Effort: Pulmonary effort is normal       Breath sounds: Normal breath sounds  No wheezing  Abdominal:      General: Bowel sounds are normal       Palpations: Abdomen is soft  Tenderness: There is abdominal tenderness  Musculoskeletal:      Right ankle: She exhibits decreased range of motion  Tenderness  Lateral malleolus and AITFL tenderness found  Lymphadenopathy:      Cervical: No cervical adenopathy  Skin:     General: Skin is warm and dry  Findings: No lesion or rash  Neurological:      General: No focal deficit present  Mental Status: She is alert and oriented to person, place, and time  Deep Tendon Reflexes: Reflexes are normal and symmetric     Psychiatric:         Judgment: Judgment normal

## 2021-04-27 ENCOUNTER — APPOINTMENT (OUTPATIENT)
Dept: LAB | Facility: HOSPITAL | Age: 66
End: 2021-04-27
Attending: PHYSICAL MEDICINE & REHABILITATION
Payer: COMMERCIAL

## 2021-04-27 ENCOUNTER — TRANSCRIBE ORDERS (OUTPATIENT)
Dept: ADMINISTRATIVE | Facility: HOSPITAL | Age: 66
End: 2021-04-27

## 2021-04-27 DIAGNOSIS — M25.561 RIGHT KNEE PAIN, UNSPECIFIED CHRONICITY: Primary | ICD-10-CM

## 2021-04-27 DIAGNOSIS — M25.561 RIGHT KNEE PAIN, UNSPECIFIED CHRONICITY: ICD-10-CM

## 2021-04-27 LAB
ALBUMIN SERPL BCP-MCNC: 3.7 G/DL (ref 3.5–5)
ALP SERPL-CCNC: 114 U/L (ref 46–116)
ALT SERPL W P-5'-P-CCNC: 27 U/L (ref 12–78)
ANION GAP SERPL CALCULATED.3IONS-SCNC: 5 MMOL/L (ref 4–13)
AST SERPL W P-5'-P-CCNC: 22 U/L (ref 5–45)
BASOPHILS # BLD AUTO: 0.01 THOUSANDS/ΜL (ref 0–0.1)
BASOPHILS NFR BLD AUTO: 0 % (ref 0–1)
BILIRUB SERPL-MCNC: 0.27 MG/DL (ref 0.2–1)
BUN SERPL-MCNC: 21 MG/DL (ref 5–25)
CALCIUM SERPL-MCNC: 9.4 MG/DL (ref 8.3–10.1)
CHLORIDE SERPL-SCNC: 106 MMOL/L (ref 100–108)
CO2 SERPL-SCNC: 31 MMOL/L (ref 21–32)
CREAT SERPL-MCNC: 0.58 MG/DL (ref 0.6–1.3)
EOSINOPHIL # BLD AUTO: 0.05 THOUSAND/ΜL (ref 0–0.61)
EOSINOPHIL NFR BLD AUTO: 1 % (ref 0–6)
ERYTHROCYTE [DISTWIDTH] IN BLOOD BY AUTOMATED COUNT: 15 % (ref 11.6–15.1)
GFR SERPL CREATININE-BSD FRML MDRD: 96 ML/MIN/1.73SQ M
GLUCOSE P FAST SERPL-MCNC: 92 MG/DL (ref 65–99)
HCT VFR BLD AUTO: 37.3 % (ref 34.8–46.1)
HGB BLD-MCNC: 11.8 G/DL (ref 11.5–15.4)
IMM GRANULOCYTES # BLD AUTO: 0.01 THOUSAND/UL (ref 0–0.2)
IMM GRANULOCYTES NFR BLD AUTO: 0 % (ref 0–2)
INR PPP: 1 (ref 0.84–1.19)
LYMPHOCYTES # BLD AUTO: 0.72 THOUSANDS/ΜL (ref 0.6–4.47)
LYMPHOCYTES NFR BLD AUTO: 21 % (ref 14–44)
MCH RBC QN AUTO: 30.8 PG (ref 26.8–34.3)
MCHC RBC AUTO-ENTMCNC: 31.6 G/DL (ref 31.4–37.4)
MCV RBC AUTO: 97 FL (ref 82–98)
MONOCYTES # BLD AUTO: 0.42 THOUSAND/ΜL (ref 0.17–1.22)
MONOCYTES NFR BLD AUTO: 12 % (ref 4–12)
NEUTROPHILS # BLD AUTO: 2.28 THOUSANDS/ΜL (ref 1.85–7.62)
NEUTS SEG NFR BLD AUTO: 66 % (ref 43–75)
NRBC BLD AUTO-RTO: 0 /100 WBCS
PLATELET # BLD AUTO: 240 THOUSANDS/UL (ref 149–390)
PMV BLD AUTO: 9.1 FL (ref 8.9–12.7)
POTASSIUM SERPL-SCNC: 4.7 MMOL/L (ref 3.5–5.3)
PROT SERPL-MCNC: 8.5 G/DL (ref 6.4–8.2)
PROTHROMBIN TIME: 13 SECONDS (ref 11.6–14.5)
RBC # BLD AUTO: 3.83 MILLION/UL (ref 3.81–5.12)
SODIUM SERPL-SCNC: 142 MMOL/L (ref 136–145)
WBC # BLD AUTO: 3.49 THOUSAND/UL (ref 4.31–10.16)

## 2021-04-27 PROCEDURE — 80053 COMPREHEN METABOLIC PANEL: CPT

## 2021-04-27 PROCEDURE — 85025 COMPLETE CBC W/AUTO DIFF WBC: CPT

## 2021-04-27 PROCEDURE — 36415 COLL VENOUS BLD VENIPUNCTURE: CPT

## 2021-04-27 PROCEDURE — 85610 PROTHROMBIN TIME: CPT

## 2021-05-03 ENCOUNTER — TELEPHONE (OUTPATIENT)
Dept: NEUROLOGY | Facility: CLINIC | Age: 66
End: 2021-05-03

## 2021-05-24 ENCOUNTER — TRANSCRIBE ORDERS (OUTPATIENT)
Dept: ADMINISTRATIVE | Facility: HOSPITAL | Age: 66
End: 2021-05-24

## 2021-05-24 ENCOUNTER — APPOINTMENT (OUTPATIENT)
Dept: LAB | Facility: HOSPITAL | Age: 66
End: 2021-05-24
Payer: COMMERCIAL

## 2021-05-24 DIAGNOSIS — Z51.81 ENCOUNTER FOR THERAPEUTIC DRUG MONITORING: ICD-10-CM

## 2021-05-24 DIAGNOSIS — Z51.81 ENCOUNTER FOR THERAPEUTIC DRUG MONITORING: Primary | ICD-10-CM

## 2021-05-24 LAB
ALBUMIN SERPL BCP-MCNC: 3.8 G/DL (ref 3.5–5)
ALP SERPL-CCNC: 115 U/L (ref 46–116)
ALT SERPL W P-5'-P-CCNC: 23 U/L (ref 12–78)
ANION GAP SERPL CALCULATED.3IONS-SCNC: 9 MMOL/L (ref 4–13)
AST SERPL W P-5'-P-CCNC: 23 U/L (ref 5–45)
BASOPHILS # BLD AUTO: 0.01 THOUSANDS/ΜL (ref 0–0.1)
BASOPHILS NFR BLD AUTO: 0 % (ref 0–1)
BILIRUB SERPL-MCNC: 0.3 MG/DL (ref 0.2–1)
BUN SERPL-MCNC: 13 MG/DL (ref 5–25)
CALCIUM SERPL-MCNC: 9.6 MG/DL (ref 8.3–10.1)
CHLORIDE SERPL-SCNC: 105 MMOL/L (ref 100–108)
CO2 SERPL-SCNC: 29 MMOL/L (ref 21–32)
CREAT SERPL-MCNC: 0.53 MG/DL (ref 0.6–1.3)
EOSINOPHIL # BLD AUTO: 0.03 THOUSAND/ΜL (ref 0–0.61)
EOSINOPHIL NFR BLD AUTO: 1 % (ref 0–6)
ERYTHROCYTE [DISTWIDTH] IN BLOOD BY AUTOMATED COUNT: 14.9 % (ref 11.6–15.1)
GFR SERPL CREATININE-BSD FRML MDRD: 99 ML/MIN/1.73SQ M
GLUCOSE SERPL-MCNC: 92 MG/DL (ref 65–140)
HCT VFR BLD AUTO: 36.2 % (ref 34.8–46.1)
HGB BLD-MCNC: 11.1 G/DL (ref 11.5–15.4)
IMM GRANULOCYTES # BLD AUTO: 0.01 THOUSAND/UL (ref 0–0.2)
IMM GRANULOCYTES NFR BLD AUTO: 0 % (ref 0–2)
LYMPHOCYTES # BLD AUTO: 0.88 THOUSANDS/ΜL (ref 0.6–4.47)
LYMPHOCYTES NFR BLD AUTO: 24 % (ref 14–44)
MCH RBC QN AUTO: 30.2 PG (ref 26.8–34.3)
MCHC RBC AUTO-ENTMCNC: 30.7 G/DL (ref 31.4–37.4)
MCV RBC AUTO: 98 FL (ref 82–98)
MONOCYTES # BLD AUTO: 0.43 THOUSAND/ΜL (ref 0.17–1.22)
MONOCYTES NFR BLD AUTO: 12 % (ref 4–12)
NEUTROPHILS # BLD AUTO: 2.37 THOUSANDS/ΜL (ref 1.85–7.62)
NEUTS SEG NFR BLD AUTO: 63 % (ref 43–75)
NRBC BLD AUTO-RTO: 0 /100 WBCS
PLATELET # BLD AUTO: 222 THOUSANDS/UL (ref 149–390)
PMV BLD AUTO: 8.9 FL (ref 8.9–12.7)
POTASSIUM SERPL-SCNC: 5.3 MMOL/L (ref 3.5–5.3)
PROT SERPL-MCNC: 7.8 G/DL (ref 6.4–8.2)
RBC # BLD AUTO: 3.68 MILLION/UL (ref 3.81–5.12)
SODIUM SERPL-SCNC: 143 MMOL/L (ref 136–145)
WBC # BLD AUTO: 3.73 THOUSAND/UL (ref 4.31–10.16)

## 2021-05-24 PROCEDURE — 36415 COLL VENOUS BLD VENIPUNCTURE: CPT

## 2021-05-24 PROCEDURE — 85025 COMPLETE CBC W/AUTO DIFF WBC: CPT

## 2021-05-24 PROCEDURE — 80053 COMPREHEN METABOLIC PANEL: CPT

## 2021-06-10 ENCOUNTER — TRANSCRIBE ORDERS (OUTPATIENT)
Dept: PHYSICAL THERAPY | Facility: CLINIC | Age: 66
End: 2021-06-10

## 2021-06-10 ENCOUNTER — EVALUATION (OUTPATIENT)
Dept: PHYSICAL THERAPY | Facility: CLINIC | Age: 66
End: 2021-06-10
Payer: COMMERCIAL

## 2021-06-10 DIAGNOSIS — M25.561 RIGHT KNEE PAIN, UNSPECIFIED CHRONICITY: ICD-10-CM

## 2021-06-10 DIAGNOSIS — M25.562 LEFT KNEE PAIN, UNSPECIFIED CHRONICITY: ICD-10-CM

## 2021-06-10 DIAGNOSIS — M17.11 OSTEOARTHRITIS OF RIGHT KNEE, UNSPECIFIED OSTEOARTHRITIS TYPE: Primary | ICD-10-CM

## 2021-06-10 DIAGNOSIS — M17.12 OSTEOARTHRITIS OF LEFT KNEE, UNSPECIFIED OSTEOARTHRITIS TYPE: ICD-10-CM

## 2021-06-10 PROCEDURE — 97162 PT EVAL MOD COMPLEX 30 MIN: CPT

## 2021-06-10 PROCEDURE — 97110 THERAPEUTIC EXERCISES: CPT

## 2021-06-10 NOTE — LETTER
Jany 10, 2021    Jailyn Alvarado MD  [facility] [address]    Patient: Yrn Contreras   YOB: 1955   Date of Visit: 6/10/2021     Encounter Diagnosis     ICD-10-CM    1  Osteoarthritis of right knee, unspecified osteoarthritis type  M17 11    2  Right knee pain, unspecified chronicity  M25 561    3  Osteoarthritis of left knee, unspecified osteoarthritis type  M17 12    4  Left knee pain, unspecified chronicity  M25 562        Dear Dr Tee Cruz: Thank you for your recent referral of Yrn Contreras  Please review the attached evaluation summary from Jessica's recent visit  Please verify that you agree with the plan of care by signing the attached order  If you have any questions or concerns, please do not hesitate to call  I sincerely appreciate the opportunity to share in the care of one of your patients and hope to have another opportunity to work with you in the near future  Sincerely,    Cristopher Rosario, PT      Referring Provider:      I certify that I have read the below Plan of Care and certify the need for these services furnished under this plan of treatment while under my care  Jailyn Alvarado MD  [facility] [address]  Via Fax: [phone]          PT Evaluation     Today's date: 6/10/2021  Patient name: Yrn Contreras  : 1955  MRN: [id number]  Referring provider: Hattie Madden MD  Dx:   Encounter Diagnosis     ICD-10-CM    1  Osteoarthritis of right knee, unspecified osteoarthritis type  M17 11    2  Right knee pain, unspecified chronicity  M25 561    3  Osteoarthritis of left knee, unspecified osteoarthritis type  M17 12    4  Left knee pain, unspecified chronicity  M25 562                   Assessment  Assessment details: Yrn Contreras is a 77 y o  female who presents today to outpatient therapy for evaluation of ARABELLA knee OA   Upon assessment today, pt exhibits decreased/painful knee AROM ARABELLA (R) > (L); decreased LE strength (R) > (L); decreased lower quarter stability during SLS; and decreased HS, gastroc, and quad flexibility ARABELLA  These impairments are contributing to functional limitations with walking >30 min and negotiating stairs  Pt would therefore benefit from PT intervention in order to address the aforementioned deficits so that she can return to her PLOF and function comfortably/safely in her home and surrounding environment  Thank you for the referral! - Lissy Shelton, PT, DPT  Impairments: abnormal gait, abnormal or restricted ROM, abnormal movement, impaired balance, impaired physical strength, pain with function and poor body mechanics  Understanding of Dx/Px/POC: good   Prognosis: good    Goals  STG 1: Pt will demonstrate compliance with HEP to supplement therapy in 1-2 weeks  STG 2: Pt will report 25% reduction in pain in 2-4 weeks  LTG 1: Pt will be able to negotiate 1 flight of stairs reciprocally with min difficulty related to the knees in 6-8 weeks  INITIAL: Able to using step to gait pattern  LTG 2: Pt will be able to ambulate 1-2 hours with min difficulty in 6-8 weeks  INITIAL: 30 min tolerance    Plan  Plan details: PT to f/u with MD office regarding specific date of procedure        Patient would benefit from: skilled physical therapy  Planned modality interventions: cryotherapy, TENS, unattended electrical stimulation, ultrasound and electrical stimulation/Russian stimulation  Planned therapy interventions: abdominal trunk stabilization, postural training, patient education, neuromuscular re-education, joint mobilization, manual therapy, massage, activity modification, balance, body mechanics training, Chirinos taping, strengthening, stretching, therapeutic activities, coordination, flexibility, home exercise program, therapeutic exercise, functional ROM exercises, gait training, balance/weight bearing training and self care  Frequency: 2x week  Duration in weeks: 4  Treatment plan discussed with: patient        Subjective Evaluation    History of Present Illness  Mechanism of injury: Pt states that about a month ago (pt cannot recall exact date of procedure), she received a stem cell treatment for her knees to help with her osteoarthritis  Since doing so, pt states that her pain is improving  The doctor is now recommending that she perform physical therapy  Currently, pt reports difficulty negotiating stairs (pt utilizes a step to pattern) and walking prolonged periods of time (> 30 min)  Pt reports an MD f/u on 8/3  Aggs: At the end of the day  Pain  Current pain rating: 3  At best pain ratin  At worst pain ratin  Location: Pt reports ARABELLA anterior knee pain, (R) > (L)  Patient Goals  Patient goal: Pt would like to avoid surgery on her knees  Objective     Active Range of Motion   Left Knee   Flexion: 115 degrees   Prone flexion: 95 degrees   Extension: WFL    Right Knee   Flexion: 106 degrees with pain  Prone flexion: 90 degrees   Extension: -22 degrees with pain    Additional Active Range of Motion Details  HS flexibility WNL on (L), min-mod dec on (R)  LTG: WNL    DF AROM: -5 on (L), -15 deg on (R)  LT deg ARABELLA    Strength/Myotome Testing     Left Hip   Planes of Motion   Flexion: 4  Abduction: 3-  External rotation: 4+  Internal rotation: 4-    Right Hip   Planes of Motion   Flexion: 4-  Abduction: 3-  External rotation: 4+  Internal rotation: 4-    Left Knee   Flexion: 4+  Extension: 4+    Right Knee   Flexion: 4  Extension: 4-    Left Ankle/Foot   Dorsiflexion: 5  Plantar flexion: 4  Inversion: 4+  Eversion: 4+    Right Ankle/Foot   Dorsiflexion: 5  Plantar flexion: 4  Inversion: 4+  Eversion: 4+    General Comments:      Knee Comments  (R) tandem stance: 20 secs, min sway  (L) tandem stance: 20 secs, mod sway  (R) SLS: 10 secs, min-mod sway  (L) SLS: 2 secs, unsteady        Flowsheet Rows      Most Recent Value   PT/OT G-Codes   Current Score  52   Projected Score  62 Precautions: SEE DETAILED PROTOCOL IN CHART - progressions every 2 weeks, no free weights  Date of procedure: TBD  Hx T/S aortic aneurysm; Sjogren's; interstitial lung disease; knee OA; psoriatic arthritis    Date 6/10            FOTO IE             Re-eval IE                Manuals 6/10            PROM - HS, quads, piriformis, gastrocs             Joint mobs                                       Neuro Re-Ed 6/10                                                                                                       Ther Ex 6/10            TO START: Recumbent bike 8'            Prone quad (S)             Prone knee flex AROM             Supine HS (S) with strap             Piriformis (S)             LTRs             Tband ankle inv/ev             SL hip abd             Reverse clams             Clams             Open books             LAQs             Hip IR/ER EOB             Wedge gastroc (S)             Hip flexor (S) on step                                                    Ther Activity 6/10                                      Gait Training 6/10                                      Modalities 6/10            Ice (NO HEAT)             E-STIM             Ultrasound (medial HS, pes anserine)

## 2021-06-10 NOTE — PROGRESS NOTES
PT Evaluation     Today's date: 6/10/2021  Patient name: Shakira Lopez  : 1955  MRN: [id number]  Referring provider: Enzo Price MD  Dx:   Encounter Diagnosis     ICD-10-CM    1  Osteoarthritis of right knee, unspecified osteoarthritis type  M17 11    2  Right knee pain, unspecified chronicity  M25 561    3  Osteoarthritis of left knee, unspecified osteoarthritis type  M17 12    4  Left knee pain, unspecified chronicity  M25 562                   Assessment  Assessment details: Shakira Lopez is a 77 y o  female who presents today to outpatient therapy for evaluation of ARABELLA knee OA  Upon assessment today, pt exhibits decreased/painful knee AROM ARABELLA (R) > (L); decreased LE strength (R) > (L); decreased lower quarter stability during SLS; and decreased HS, gastroc, and quad flexibility ARABELLA  These impairments are contributing to functional limitations with walking >30 min and negotiating stairs  Pt would therefore benefit from PT intervention in order to address the aforementioned deficits so that she can return to her PLOF and function comfortably/safely in her home and surrounding environment  Thank you for the referral! - Latia Bailey, PT, DPT  Impairments: abnormal gait, abnormal or restricted ROM, abnormal movement, impaired balance, impaired physical strength, pain with function and poor body mechanics  Understanding of Dx/Px/POC: good   Prognosis: good    Goals  STG 1: Pt will demonstrate compliance with HEP to supplement therapy in 1-2 weeks  STG 2: Pt will report 25% reduction in pain in 2-4 weeks  LTG 1: Pt will be able to negotiate 1 flight of stairs reciprocally with min difficulty related to the knees in 6-8 weeks  INITIAL: Able to using step to gait pattern  LTG 2: Pt will be able to ambulate 1-2 hours with min difficulty in 6-8 weeks  INITIAL: 30 min tolerance    Plan  Plan details: PT to f/u with MD office regarding specific date of procedure        Patient would benefit from: skilled physical therapy  Planned modality interventions: cryotherapy, TENS, unattended electrical stimulation, ultrasound and electrical stimulation/Russian stimulation  Planned therapy interventions: abdominal trunk stabilization, postural training, patient education, neuromuscular re-education, joint mobilization, manual therapy, massage, activity modification, balance, body mechanics training, Chirinos taping, strengthening, stretching, therapeutic activities, coordination, flexibility, home exercise program, therapeutic exercise, functional ROM exercises, gait training, balance/weight bearing training and self care  Frequency: 2x week  Duration in weeks: 4  Treatment plan discussed with: patient        Subjective Evaluation    History of Present Illness  Mechanism of injury: Pt states that about a month ago (pt cannot recall exact date of procedure), she received a stem cell treatment for her knees to help with her osteoarthritis  Since doing so, pt states that her pain is improving  The doctor is now recommending that she perform physical therapy  Currently, pt reports difficulty negotiating stairs (pt utilizes a step to pattern) and walking prolonged periods of time (> 30 min)  Pt reports an MD f/u on 8/3  Aggs: At the end of the day  Pain  Current pain rating: 3  At best pain ratin  At worst pain ratin  Location: Pt reports ARABELLA anterior knee pain, (R) > (L)  Patient Goals  Patient goal: Pt would like to avoid surgery on her knees  Objective     Active Range of Motion   Left Knee   Flexion: 115 degrees   Prone flexion: 95 degrees   Extension: WFL    Right Knee   Flexion: 106 degrees with pain  Prone flexion: 90 degrees   Extension: -22 degrees with pain    Additional Active Range of Motion Details  HS flexibility WNL on (L), min-mod dec on (R)  LTG: WNL    DF AROM: -5 on (L), -15 deg on (R)   LT deg ARABELLA    Strength/Myotome Testing     Left Hip   Planes of Motion   Flexion: 4  Abduction: 3-  External rotation: 4+  Internal rotation: 4-    Right Hip   Planes of Motion   Flexion: 4-  Abduction: 3-  External rotation: 4+  Internal rotation: 4-    Left Knee   Flexion: 4+  Extension: 4+    Right Knee   Flexion: 4  Extension: 4-    Left Ankle/Foot   Dorsiflexion: 5  Plantar flexion: 4  Inversion: 4+  Eversion: 4+    Right Ankle/Foot   Dorsiflexion: 5  Plantar flexion: 4  Inversion: 4+  Eversion: 4+    General Comments:      Knee Comments  (R) tandem stance: 20 secs, min sway  (L) tandem stance: 20 secs, mod sway  (R) SLS: 10 secs, min-mod sway  (L) SLS: 2 secs, unsteady  Flowsheet Rows      Most Recent Value   PT/OT G-Codes   Current Score  52   Projected Score  62             Precautions: SEE DETAILED PROTOCOL IN CHART - progressions every 2 weeks, no free weights  Date of procedure: TBD  Hx T/S aortic aneurysm; Sjogren's; interstitial lung disease; knee OA; psoriatic arthritis    Date 6/10            FOTO IE             Re-eval IE                Manuals 6/10            PROM - HS, quads, piriformis, gastrocs             Joint mobs                                       Neuro Re-Ed 6/10                                                                                                       Ther Ex 6/10            TO START: Recumbent bike 8'            Prone quad (S)             Prone knee flex AROM             Supine HS (S) with strap             Piriformis (S)             LTRs             Tband ankle inv/ev             SL hip abd             Reverse clams             Clams             Open books             LAQs             Hip IR/ER EOB             Wedge gastroc (S)             Hip flexor (S) on step                                                    Ther Activity 6/10                                      Gait Training 6/10                                      Modalities 6/10            Ice (NO HEAT)             E-STIM             Ultrasound (medial HS, pes anserine)

## 2021-06-14 ENCOUNTER — OFFICE VISIT (OUTPATIENT)
Dept: PHYSICAL THERAPY | Facility: CLINIC | Age: 66
End: 2021-06-14
Payer: COMMERCIAL

## 2021-06-14 DIAGNOSIS — M25.561 RIGHT KNEE PAIN, UNSPECIFIED CHRONICITY: ICD-10-CM

## 2021-06-14 DIAGNOSIS — M17.11 OSTEOARTHRITIS OF RIGHT KNEE, UNSPECIFIED OSTEOARTHRITIS TYPE: Primary | ICD-10-CM

## 2021-06-14 DIAGNOSIS — M17.12 OSTEOARTHRITIS OF LEFT KNEE, UNSPECIFIED OSTEOARTHRITIS TYPE: ICD-10-CM

## 2021-06-14 DIAGNOSIS — M25.562 LEFT KNEE PAIN, UNSPECIFIED CHRONICITY: ICD-10-CM

## 2021-06-14 PROCEDURE — 97140 MANUAL THERAPY 1/> REGIONS: CPT

## 2021-06-14 PROCEDURE — 97110 THERAPEUTIC EXERCISES: CPT

## 2021-06-14 NOTE — PROGRESS NOTES
Daily Note     Today's date: 2021  Patient name: Berna Huddleston  : 1955  MRN: [id number]  Referring provider: Venita Frost MD  Dx:   Encounter Diagnosis     ICD-10-CM    1  Osteoarthritis of right knee, unspecified osteoarthritis type  M17 11    2  Right knee pain, unspecified chronicity  M25 561    3  Osteoarthritis of left knee, unspecified osteoarthritis type  M17 12    4  Left knee pain, unspecified chronicity  M25 562                   Subjective: Pt states that her knees are hurting her today  Her (L) lateral hip was also sore following IE last week  Pt states that she checked with her daughter and her stem cell procedure was on 21  Objective: See treatment diary below      Assessment: Tolerated treatment well  Initiated today's tx session per protocol and began with primarily passive ROM/stretching to ease into program  Pt able to complete without adverse reaction however had some difficulty performing LAQs on (R) > (L) d/t pain  Instructed pt to perform these within pain-free range  Decreased flexibility demonstrated consistently thru (R) LE compared to (L)  Post tx session pt reported feeling "good " Issued pt updated HEP and educated pt today about use of ice for pain modulation (as indicated per protocol); goals of PT; and appropriate sensations during stretches  Patient demonstrated fatigue post treatment, exhibited good technique with therapeutic exercises and would benefit from continued PT to progress LE flexibility to improve pt's tolerance to performing ADLs  Plan: Continue per plan of care  Progress treatment as tolerated  Precautions: SEE DETAILED PROTOCOL IN CHART - progressions every 2 weeks, no free weights  Date of procedure:   Hx T/S aortic aneurysm; Sjogren's; interstitial lung disease; knee OA; psoriatic arthritis    Date 6/10 6/14           FOTO IE             Re-eval IE                Manuals 6/10 6/14           PROM - HS, quads, piriformis, gastrocs  AL 3x30" ea ARABELLA           Joint mobs                                       Neuro Re-Ed 6/10 6/14                                                                                                      Ther Ex 6/10 6/14           TO START: Recumbent bike 8' 8'           Prone quad (S)             Prone knee flex AROM             Supine HS (S) with strap  4x20" HEP           Heelslides             LTRs  10x5" with green SWB d/t p!  In h/L            Tband ankle inv/ev             SL hip abd             Reverse clams             Clams             Open books             LAQs  2x10 ea           Hip IR/ER EOB             Wedge gastroc (S)  supine with strap 4x20" ea HEP           Hip flexor (S) on step                                                    Ther Activity 6/10 6/14                                     Gait Training 6/10 6/14                                     Modalities 6/10 6/14           Ice (NO HEAT)  10' ARABELLA knees post           E-STIM             Ultrasound (medial HS, pes anserine)

## 2021-06-17 ENCOUNTER — OFFICE VISIT (OUTPATIENT)
Dept: PHYSICAL THERAPY | Facility: CLINIC | Age: 66
End: 2021-06-17
Payer: COMMERCIAL

## 2021-06-17 DIAGNOSIS — M17.12 OSTEOARTHRITIS OF LEFT KNEE, UNSPECIFIED OSTEOARTHRITIS TYPE: ICD-10-CM

## 2021-06-17 DIAGNOSIS — M25.562 LEFT KNEE PAIN, UNSPECIFIED CHRONICITY: ICD-10-CM

## 2021-06-17 DIAGNOSIS — M17.11 OSTEOARTHRITIS OF RIGHT KNEE, UNSPECIFIED OSTEOARTHRITIS TYPE: Primary | ICD-10-CM

## 2021-06-17 DIAGNOSIS — M25.561 RIGHT KNEE PAIN, UNSPECIFIED CHRONICITY: ICD-10-CM

## 2021-06-17 PROCEDURE — 97530 THERAPEUTIC ACTIVITIES: CPT

## 2021-06-17 NOTE — PROGRESS NOTES
Daily Note     Today's date: 2021  Patient name: Dereje Gil  : 1955  MRN: [id number]  Referring provider: Campos Mckeon MD  Dx:   Encounter Diagnosis     ICD-10-CM    1  Osteoarthritis of right knee, unspecified osteoarthritis type  M17 11    2  Right knee pain, unspecified chronicity  M25 561    3  Osteoarthritis of left knee, unspecified osteoarthritis type  M17 12    4  Left knee pain, unspecified chronicity  M25 562        Start Time: 1135  Stop Time: 1210  Total time in clinic (min): 35 minutes  Subjective: Pt reports feeling "a little better" following LV  Pt reports her (R) knee has increased soreness today which she attributes to "overdoing it" yesterday  Objective: See treatment diary below  Pt enters Week 7 of post-procedure today  Assessment: Tolerated treatment well  Continued /c conservative program with mostly non-WB'ing activities  2/2 recommendation of DPT, AL  Pt tolerated additions to exercise diary well and without c/o pain Sx  Pt demonstrated good form /c TB inv/ev - (R)LE weaker than (L)LE  Pt demonstrated fatigue post treatment, exhibited good technique with therapeutic exercises and would benefit from continued PT to progress LE flexibility to improve pt's tolerance to performing ADLs  Plan: Cont /c PT POC  Progress as tolerated  Precautions: SEE DETAILED PROTOCOL IN CHART - progressions every 2 weeks, no free weights  Date of procedure:   Hx T/S aortic aneurysm; Sjogren's; interstitial lung disease; knee OA; psoriatic arthritis    Date 6/10 6/14 06/17          FOTO IE             Re-eval IE                Manuals 6/10 6/14 06/17          PROM - HS, quads, piriformis, gastrocs  AL 3x30" ea ARABELLA           Joint mobs                                       Neuro Re-Ed 6/10 6/14 06/17                                                                                                     Ther Ex 6/10 6/14 06/17          TO START: Recumbent bike 8' 8' 8' ROM Prone quad (S)             Prone knee flex AROM             Supine HS (S) with strap  4x20" HEP           Heelslides             LTRs  10x5" with green SWB d/t p!  In h/L            Tband ankle inv/ev   GTB 20ea (B/L)          SL hip abd             Reverse clams   20ea          Clams   20ea          Open books             LAQs  2x10 ea 2x10ea          Hip IR/ER EOB   2x10ea (B/L)          Wedge gastroc (S)  supine with strap 4x20" ea HEP /c strap 30"x3ea          Hip flexor (S) on step                                                    Ther Activity 6/10 6/14 06/17                                    Gait Training 6/10 6/14 06/17                                    Modalities 6/10 6/14 06/17          Ice (NO HEAT)  10' ARABELLA knees post           E-STIM             Ultrasound (medial HS, pes anserine)                 Queenie Khan, PTA

## 2021-06-21 ENCOUNTER — OFFICE VISIT (OUTPATIENT)
Dept: PHYSICAL THERAPY | Facility: CLINIC | Age: 66
End: 2021-06-21
Payer: COMMERCIAL

## 2021-06-21 DIAGNOSIS — M17.12 OSTEOARTHRITIS OF LEFT KNEE, UNSPECIFIED OSTEOARTHRITIS TYPE: ICD-10-CM

## 2021-06-21 DIAGNOSIS — M25.561 RIGHT KNEE PAIN, UNSPECIFIED CHRONICITY: ICD-10-CM

## 2021-06-21 DIAGNOSIS — M25.562 LEFT KNEE PAIN, UNSPECIFIED CHRONICITY: ICD-10-CM

## 2021-06-21 DIAGNOSIS — M17.11 OSTEOARTHRITIS OF RIGHT KNEE, UNSPECIFIED OSTEOARTHRITIS TYPE: Primary | ICD-10-CM

## 2021-06-21 PROCEDURE — 97110 THERAPEUTIC EXERCISES: CPT

## 2021-06-21 NOTE — PROGRESS NOTES
Daily Note     Today's date: 2021  Patient name: Chey Green  : 1955  MRN: [id number]  Referring provider: Uriel Pedraza MD  Dx:   Encounter Diagnosis     ICD-10-CM    1  Osteoarthritis of right knee, unspecified osteoarthritis type  M17 11    2  Right knee pain, unspecified chronicity  M25 561    3  Osteoarthritis of left knee, unspecified osteoarthritis type  M17 12    4  Left knee pain, unspecified chronicity  M25 562        Start Time: 1138  Stop Time: 1222  Total time in clinic (min): 44 minutes  Subjective: Pt reports "a little" DOMS in glute/hip musculature following LV  PT arrives to today's Tx noting she has been feeling "much better" since LV  Objective: See treatment diary below  Pt remains in Week 7 of post-procedure today  Assessment: Tolerated treatment well  Pt demonstrated improved mobility today as well as speed/form /c recumbent bike - would benefit from continued gentle progression within parameters of protocol  PT reports discomfort in (R) knee /c knee ext in long sit position  Pt demonstrated fatigue post treatment, exhibited good technique with therapeutic exercises and would benefit from continued PT to progress LE flexibility to improve pt's tolerance to performing ADLs  Plan: Cont /c PT POC  Progress as tolerated  Precautions: SEE DETAILED PROTOCOL IN CHART - progressions every 2 weeks, no free weights  Date of procedure:   Hx T/S aortic aneurysm; Sjogren's; interstitial lung disease; knee OA; psoriatic arthritis    Date 6/10 6/14 06/17 06/21         FOTO IE             Re-eval IE                Manuals 6/10 6/14 06/17 06/21         PROM - HS, quads, piriformis, gastrocs  AL 3x30" ea ARABELLA           Joint mobs                                       Neuro Re-Ed 6/10 6/14 06/17 06/21                                                                                                    Ther Ex 6/10 6/14 06/17 06/21         TO START: Recumbent bike 8' 8' 8' ROM 8' full L1?        Prone quad (S)             Prone knee flex AROM             Supine HS (S) with strap  4x20" HEP           Heelslides             LTRs  10x5" with green SWB d/t p!  In h/L            Tband ankle inv/ev   GTB 20ea (B/L) GTB 30ea (B/L)         SL hip abd             Reverse clams   20ea 30ea         Clams   20ea 30ea         Open books             LAQs  2x10 ea 2x10ea 5"x30ea         Hip IR/ER EOB   2x10ea (B/L) 2x15ea (B/L)         Wedge gastroc (S)  supine with strap 4x20" ea HEP /c strap 30"x3ea /c strap 30"x3ea         Hip flexor (S) on step                                                    Ther Activity 6/10 6/14 06/17 06/21                                   Gait Training 6/10 6/14 06/17 06/21                                   Modalities 6/10 6/14 06/17 06/21         Ice (NO HEAT)  10' ARABELLA knees post  10' (B/L) /c TE         E-STIM             Ultrasound (medial HS, pes anserine)                 Lidia Marking, PTA

## 2021-06-24 ENCOUNTER — OFFICE VISIT (OUTPATIENT)
Dept: PHYSICAL THERAPY | Facility: CLINIC | Age: 66
End: 2021-06-24
Payer: COMMERCIAL

## 2021-06-24 DIAGNOSIS — M17.12 OSTEOARTHRITIS OF LEFT KNEE, UNSPECIFIED OSTEOARTHRITIS TYPE: ICD-10-CM

## 2021-06-24 DIAGNOSIS — M17.11 OSTEOARTHRITIS OF RIGHT KNEE, UNSPECIFIED OSTEOARTHRITIS TYPE: Primary | ICD-10-CM

## 2021-06-24 DIAGNOSIS — M25.562 LEFT KNEE PAIN, UNSPECIFIED CHRONICITY: ICD-10-CM

## 2021-06-24 DIAGNOSIS — M25.561 RIGHT KNEE PAIN, UNSPECIFIED CHRONICITY: ICD-10-CM

## 2021-06-24 PROCEDURE — 97530 THERAPEUTIC ACTIVITIES: CPT

## 2021-06-24 NOTE — PROGRESS NOTES
Daily Note     Today's date: 2021  Patient name: Gaye Guzman  : 1955  MRN: [id number]  Referring provider: Shawna Coker MD  Dx:   Encounter Diagnosis     ICD-10-CM    1  Osteoarthritis of right knee, unspecified osteoarthritis type  M17 11    2  Right knee pain, unspecified chronicity  M25 561    3  Osteoarthritis of left knee, unspecified osteoarthritis type  M17 12    4  Left knee pain, unspecified chronicity  M25 562        Start Time: 1100  Stop Time: 1153  Total time in clinic (min): 53 minutes  Subjective: Pt arrives to today's Tx noting "I think I over did it yesterday, I was walking around in [place] "   Pt notes that despite increased soreness in (R) knee from increased activity level that she has been moving around and walking better lately  Objective: See treatment diary below  Pt enters Week 8 of post-procedure today  Assessment: Tolerated treatment well  Pt notes fatigue in (B/L)LE following today's Tx - discussed /c pt continuing to gently progress program as tolerated and per protocol  Pt demonstrated fatigue post treatment, exhibited good technique with therapeutic exercises and would benefit from continued PT to progress LE flexibility to improve pt's tolerance to performing ADLs  Plan: Cont /c PT POC  Progress as tolerated  Precautions: SEE DETAILED PROTOCOL IN CHART - progressions every 2 weeks, no free weights  Date of procedure:   Hx T/S aortic aneurysm; Sjogren's; interstitial lung disease; knee OA; psoriatic arthritis    Date 6/10 6/14 06/17 06/21 06/24        FOTO IE             Re-eval IE                Manuals 6/10 6/14 06/17 06/21 06/24        PROM - HS, quads, piriformis, gastrocs  AL 3x30" ea ARABELLA           Joint mobs                                       Neuro Re-Ed 6/10 6/14 06/17 06/21 06/24        Rockerboard      25ea                                                                                      Ther Ex 6/10 6/14 06/17 06/21 06/24        TO START: Recumbent bike 8' 8' 8' ROM 8' full  8' L0        Prone quad (S)     10"x10        Prone knee flex AROM     2x10        Supine HS (S) with strap  4x20" HEP           Heelslides             LTRs  10x5" with green SWB d/t p!  In h/L            Tband ankle inv/ev   GTB 20ea (B/L) GTB 30ea (B/L)         SL hip abd             Reverse clams   20ea 30ea         Clams   20ea 30ea 30ea        Open books             LAQs  2x10 ea 2x10ea 5"x30ea 5"x30ea        Hip IR/ER EOB   2x10ea (B/L) 2x15ea (B/L)         Wedge gastroc (S)  supine with strap 4x20" ea HEP /c strap 30"x3ea /c strap 30"x3ea 20"x5 wedge        Hip flexor (S) on step                                                    Ther Activity 6/10 6/14 06/17 06/21 06/24                                  Gait Training 6/10 6/14 06/17 06/21 06/24                                  Modalities 6/10 6/14 06/17 06/21 06/24        Ice (NO HEAT)  10' ARABELLA knees post  10' (B/L) /c TE 10' (B/L) post        E-STIM             Ultrasound (medial HS, pes anserine)                 Justin Zimmer, PTA

## 2021-06-28 ENCOUNTER — OFFICE VISIT (OUTPATIENT)
Dept: PHYSICAL THERAPY | Facility: CLINIC | Age: 66
End: 2021-06-28
Payer: COMMERCIAL

## 2021-06-28 DIAGNOSIS — M25.562 LEFT KNEE PAIN, UNSPECIFIED CHRONICITY: ICD-10-CM

## 2021-06-28 DIAGNOSIS — M25.561 RIGHT KNEE PAIN, UNSPECIFIED CHRONICITY: ICD-10-CM

## 2021-06-28 DIAGNOSIS — M17.12 OSTEOARTHRITIS OF LEFT KNEE, UNSPECIFIED OSTEOARTHRITIS TYPE: ICD-10-CM

## 2021-06-28 DIAGNOSIS — M17.11 OSTEOARTHRITIS OF RIGHT KNEE, UNSPECIFIED OSTEOARTHRITIS TYPE: Primary | ICD-10-CM

## 2021-06-28 PROCEDURE — 97110 THERAPEUTIC EXERCISES: CPT

## 2021-06-28 NOTE — PROGRESS NOTES
Daily Note     Today's date: 2021  Patient name: Mack Gusman  : 1955  MRN: [id number]  Referring provider: Carol Ocasio MD  Dx:   Encounter Diagnosis     ICD-10-CM    1  Osteoarthritis of right knee, unspecified osteoarthritis type  M17 11    2  Right knee pain, unspecified chronicity  M25 561    3  Osteoarthritis of left knee, unspecified osteoarthritis type  M17 12    4  Left knee pain, unspecified chronicity  M25 562        Start Time: 1140  Stop Time: 1238  Total time in clinic (min): 58 minutes  Subjective: Pt arrives to today's Tx noting her (L) knee is continuing to feel better  Pt states that she occasionally has "burning pain" along anterior aspect of (L) patella that wraps both  Medially + laterally  Pt states she was very active this weekend and has noticed improvements in (L) knee mobility, but thinks she may have done "too much" over the weekend  Pt arrives 10' late to today's Tx - able to accommodate /c shortened program      Objective: See treatment diary below  Pt ermains in Week 8 of post-procedure today  Assessment: Tolerated treatment well  Added standing knee flex per protoocl - pt tolerated addition well - noted appropriate muscle activation sensations  Pt reports fatigue post Tx - used CP for pain modulation  Pt demonstrates fair bed mobility /c some difficulty getting in to prone position  Pt demonstrated fatigue post treatment, exhibited good technique with therapeutic exercises and would benefit from continued PT to progress LE flexibility to improve pt's tolerance to performing ADLs  Plan: Cont /c PT POC  Progress as tolerated  Precautions: SEE DETAILED PROTOCOL IN CHART - progressions every 2 weeks, no free weights  Date of procedure:   Hx T/S aortic aneurysm; Sjogren's; interstitial lung disease; knee OA; psoriatic arthritis    Date 6/10 6/14 06/17 06/21 06/24 06/28       FOTO IE             Re-eval IE                Manuals 6/10 6/14 06/17 06/21 06/24 06/28       PROM - HS, quads, piriformis, gastrocs  AL 3x30" ea ARABELLA           Joint mobs                                       Neuro Re-Ed 6/10 6/14 06/17 06/21 06/24 06/28       Rockerboard      25ea 30ea                                                                                     Ther Ex 6/10 6/14 06/17 06/21 06/24 06/28       TO START: Recumbent bike 8' 8' 8' ROM 8' full  8' L0 8' L1        Prone quad (S)     10"x10 20"x5ea       Prone knee flex AROM     2x10        Supine HS (S) with strap  4x20" HEP           Heelslides             LTRs  10x5" with green SWB d/t p!  In h/L            Tband ankle inv/ev   GTB 20ea (B/L) GTB 30ea (B/L)         SL hip abd             Reverse clams   20ea 30ea  30ea       Clams   20ea 30ea 30ea 30ea       Open books             LAQs  2x10 ea 2x10ea 5"x30ea 5"x30ea 5"x30ea       Hip IR/ER EOB   2x10ea (B/L) 2x15ea (B/L)  2x15ea (B/L)       Wedge gastroc (S)  supine with strap 4x20" ea HEP /c strap 30"x3ea /c strap 30"x3ea 20"x5 wedge        Hip flexor (S) on step             Standing HS Curl      10ea foam                                 Ther Activity 6/10 6/14 06/17 06/21 06/24 06/28                                 Gait Training 6/10 6/14 06/17 06/21 06/24 06/28                                 Modalities 6/10 6/14 06/17 06/21 06/24 06/28       Ice (NO HEAT)  10' ARABELLA knees post  10' (B/L) /c TE 10' (B/L) post 10' (B/L) post       E-STIM             Ultrasound (medial HS, pes anserine)                 Nicole Mcelroy, PTA

## 2021-06-29 ENCOUNTER — RA CDI HCC (OUTPATIENT)
Dept: OTHER | Facility: HOSPITAL | Age: 66
End: 2021-06-29

## 2021-06-29 NOTE — PROGRESS NOTES
[place] [address]  coding opportunities             Chart reviewed, (number of) suggestions sent to provider: 4                  Patients insurance company: [employer] (Medicare Advantage only)     DX: M35 02 Sicca syndrome with lung involvement  DX: J84 9 Interstitial pulmonary disease, unspecified  DX: I71 2 Thoracic aortic aneurysm, without rupture  DX: L40 50 Arthropathic psoriasis, unspecified       Provider never responded to [place] [address]  coding request   DX: M35 02 and J84 9 were used  DX: I71 2 and L40 50 were not used

## 2021-07-01 ENCOUNTER — EVALUATION (OUTPATIENT)
Dept: PHYSICAL THERAPY | Facility: CLINIC | Age: 66
End: 2021-07-01
Payer: COMMERCIAL

## 2021-07-01 DIAGNOSIS — M17.12 OSTEOARTHRITIS OF LEFT KNEE, UNSPECIFIED OSTEOARTHRITIS TYPE: ICD-10-CM

## 2021-07-01 DIAGNOSIS — M25.561 RIGHT KNEE PAIN, UNSPECIFIED CHRONICITY: ICD-10-CM

## 2021-07-01 DIAGNOSIS — M25.562 LEFT KNEE PAIN, UNSPECIFIED CHRONICITY: ICD-10-CM

## 2021-07-01 DIAGNOSIS — M17.11 OSTEOARTHRITIS OF RIGHT KNEE, UNSPECIFIED OSTEOARTHRITIS TYPE: Primary | ICD-10-CM

## 2021-07-01 PROCEDURE — 97140 MANUAL THERAPY 1/> REGIONS: CPT

## 2021-07-01 NOTE — PROGRESS NOTES
PT Re-Evaluation     Today's date: 2021  Patient name: Machelle Jansen  : 1955  MRN: [id number]  Referring provider: Charlotte Lang MD  Dx:   Encounter Diagnosis     ICD-10-CM    1  Osteoarthritis of right knee, unspecified osteoarthritis type  M17 11    2  Right knee pain, unspecified chronicity  M25 561    3  Osteoarthritis of left knee, unspecified osteoarthritis type  M17 12    4  Left knee pain, unspecified chronicity  M25 562                   Assessment  Assessment details: To date, Ms Feliciano Ramsay has participated in a total of 7 skilled therapy sessions for tx of ARABELLA knee OA  Upon reassessment today, she is making steady progress toward her goals  Objectively, she shows increasing SL stability; increasing LE strength; increased (R) knee ext AROM; and improving (R) HS flexibility/ARABELLA gastroc flexibility  Subjectively, she reports an improved ability straightening her (R) knee which allows for improved positioning while sleeping supine  She also notes an improving tolerance to walking, estimating that she can now walk up to 45 min at a time  She remains limited with transferring; negotiating stairs; and walking prolonged periods of time  Objectively, she continues to present with decreased/painful knee AROM ARABELLA; decreased (R) HS flexibility and ARABELLA quad flexibility; decreased LE strength (particularly thru the hips); and decreased SL stability particularly on the (L)  She would therefore benefit from 4 more weeks of PT intervention in order to address the aforementioned deficits so that she can return to her PLOF and function comfortably/safely in her home and surrounding environment  Impairments: abnormal gait, abnormal or restricted ROM, abnormal movement, impaired balance, impaired physical strength, pain with function and poor body mechanics  Understanding of Dx/Px/POC: good   Prognosis: good    Goals  STG 1: Pt will demonstrate compliance with HEP to supplement therapy in 1-2 weeks   PROGRESSING  STG 2: Pt will report 25% reduction in pain in 2-4 weeks  NOT MET  LTG 1: Pt will be able to negotiate 1 flight of stairs reciprocally with min difficulty related to the knees in 6-8 weeks  Current: Able to using step to gait pattern, requires heavy reliance on UE to pull self up  LTG 2: Pt will be able to ambulate 1-2 hours with min difficulty in 6-8 weeks  Current: 45 min (was 30 min)  LTG 3: Pt will be able to transfer from all surfaces in her home with min to no difficulty related to the knees in 6-8 weeks  NEW    Plan  Patient would benefit from: skilled physical therapy  Planned modality interventions: cryotherapy, TENS, unattended electrical stimulation, ultrasound and electrical stimulation/Russian stimulation  Planned therapy interventions: abdominal trunk stabilization, postural training, patient education, neuromuscular re-education, joint mobilization, manual therapy, massage, activity modification, balance, body mechanics training, Chirinos taping, strengthening, stretching, therapeutic activities, coordination, flexibility, home exercise program, therapeutic exercise, functional ROM exercises, gait training, balance/weight bearing training and self care  Frequency: 2x week  Duration in weeks: 4  Treatment plan discussed with: patient        Subjective Evaluation    History of Present Illness  Mechanism of injury: Pt states that since coming to therapy, she is feeling better, reporting a little less pain in the knees  She can also perform more than before, such as positioning the (R) LE in bed while sleeping  Pt continues to report difficulty transferring (anthony after seated for prolonged periods of time) and negotiating stairs  Pain  Current pain ratin  At best pain ratin  At worst pain ratin  Location: Pt reports ARABELLA anterior knee pain, (R) > (L)  Patient Goals  Patient goal: Pt would like to avoid surgery on her knees          Objective     Active Range of Motion   Left Knee   Flexion: 115 degrees   Prone flexion: 95 degrees   Extension: WFL    Right Knee   Flexion: 106 degrees with pain  Prone flexion: 90 degrees   Extension: -20 degrees with pain    Additional Active Range of Motion Details  HS flexibility WNL on (L), mi dec on (R)  LTG: WNL    DF AROM: 8 on (L) (was -5), 5 deg on (R) (was -15)  LT deg ARABELLA    Strength/Myotome Testing     Left Hip   Planes of Motion   Flexion: 4+  Abduction: 3-  External rotation: 4+  Internal rotation: 4-    Right Hip   Planes of Motion   Flexion: 4  Abduction: 3-  External rotation: 5  Internal rotation: 4-    Left Knee   Flexion: 5  Extension: 4+    Right Knee   Flexion: 4+  Extension: 4+    Left Ankle/Foot   Dorsiflexion: 5  Plantar flexion: 4+  Inversion: 5  Eversion: 5    Right Ankle/Foot   Dorsiflexion: 5  Plantar flexion: 4  Inversion: 5  Eversion: 5    General Comments:      Knee Comments  (R) tandem stance: 20 secs, no sway  (L) tandem stance: 20 secs, min-mod sway  (R) SLS: 12 secs, min sway, required multiple attempts  (L) SLS: 2 secs, unsteady, required multiple attempts  Precautions: SEE DETAILED PROTOCOL IN CHART - progressions every 2 weeks, no free weights  Date of procedure: 2021  Hx T/S aortic aneurysm; Sjogren's; interstitial lung disease; knee OA; psoriatic arthritis    Date 6/10 6/14 06/17 06/21 06/24 06/28 7/1      FOTO IE       XX      Re-eval IE      XX          Manuals 6/10 6/14 06/17 06/21 06/24 06/28 7/1      PROM - HS, quads, piriformis, gastrocs  AL 3x30" ea ARABELLA           Joint mobs             Re-eval       AL                   Neuro Re-Ed 6/10 6/14 06/17 06/21 06/24 06/28 7/1      Rockerboard      25ea 30ea                                                                                     Ther Ex 6/10 6/14 06/17 06/21 06/24 06/28 7/1      TO START: Recumbent bike 8' 8' 8' ROM 8' full  8' L0 8' L1  10' L0      TM walk       5'       Prone quad (S)     10"x10 20"x5ea       Prone knee flex AROM     2x10 Supine HS (S) with strap  4x20" HEP           Heelslides             LTRs  10x5" with green SWB d/t p!  In h/L            Tband ankle inv/ev   GTB 20ea (B/L) GTB 30ea (B/L)         SL hip abd             Reverse clams   20ea 30ea  30ea       Clams   20ea 30ea 30ea 30ea       Open books             LAQs  2x10 ea 2x10ea 5"x30ea 5"x30ea 5"x30ea       Hip IR/ER EOB   2x10ea (B/L) 2x15ea (B/L)  2x15ea (B/L)       Wedge gastroc (S)  supine with strap 4x20" ea HEP /c strap 30"x3ea /c strap 30"x3ea 20"x5 wedge        Hip flexor (S) on step             Standing HS Curl      10ea foam                                 Ther Activity 6/10 6/14 06/17 06/21 06/24 06/28 7/1                                Gait Training 6/10 6/14 06/17 06/21 06/24 06/28 7/1                                Modalities 6/10 6/14 06/17 06/21 06/24 06/28 7/1      Ice (NO HEAT)  10' ARABELLA knees post  10' (B/L) /c TE 10' (B/L) post 10' (B/L) post       E-STIM             Ultrasound (medial HS, pes anserine)

## 2021-07-01 NOTE — LETTER
2021    Isela Costa, [address] [facility]  [address] [facility]    Patient: Nelly Fields   YOB: 1955   Date of Visit: 2021     Encounter Diagnosis     ICD-10-CM    1  Osteoarthritis of right knee, unspecified osteoarthritis type  M17 11    2  Right knee pain, unspecified chronicity  M25 561    3  Osteoarthritis of left knee, unspecified osteoarthritis type  M17 12    4  Left knee pain, unspecified chronicity  M25 562        Dear Dr Pamela Aguilar: Thank you for your recent referral of Nelly Fields  Please review the attached evaluation summary from Jessica's recent visit  Please verify that you agree with the plan of care by signing the attached order  If you have any questions or concerns, please do not hesitate to call  I sincerely appreciate the opportunity to share in the care of one of your patients and hope to have another opportunity to work with you in the near future  Sincerely,    Christos Sage PT      Referring Provider:      I certify that I have read the below Plan of Care and certify the need for these services furnished under this plan of treatment while under my care  Isela Costa, DO  [address]  Via Fax: [phone]          PT Re-Evaluation     Today's date: 2021  Patient name: Nelly Fields  : 1955  MRN: [id number]  Referring provider: Dariusz Camargo MD  Dx:   Encounter Diagnosis     ICD-10-CM    1  Osteoarthritis of right knee, unspecified osteoarthritis type  M17 11    2  Right knee pain, unspecified chronicity  M25 561    3  Osteoarthritis of left knee, unspecified osteoarthritis type  M17 12    4  Left knee pain, unspecified chronicity  M25 562                   Assessment  Assessment details: To date, Ms Kerline Peoples has participated in a total of 7 skilled therapy sessions for tx of ARABELLA knee OA  Upon reassessment today, she is making steady progress toward her goals   Objectively, she shows increasing SL stability; increasing LE strength; increased (R) knee ext AROM; and improving (R) HS flexibility/ARABELLA gastroc flexibility  Subjectively, she reports an improved ability straightening her (R) knee which allows for improved positioning while sleeping supine  She also notes an improving tolerance to walking, estimating that she can now walk up to 45 min at a time  She remains limited with transferring; negotiating stairs; and walking prolonged periods of time  Objectively, she continues to present with decreased/painful knee AROM ARABELLA; decreased (R) HS flexibility and ARABELLA quad flexibility; decreased LE strength (particularly thru the hips); and decreased SL stability particularly on the (L)  She would therefore benefit from 4 more weeks of PT intervention in order to address the aforementioned deficits so that she can return to her PLOF and function comfortably/safely in her home and surrounding environment  Impairments: abnormal gait, abnormal or restricted ROM, abnormal movement, impaired balance, impaired physical strength, pain with function and poor body mechanics  Understanding of Dx/Px/POC: good   Prognosis: good    Goals  STG 1: Pt will demonstrate compliance with HEP to supplement therapy in 1-2 weeks  PROGRESSING  STG 2: Pt will report 25% reduction in pain in 2-4 weeks  NOT MET  LTG 1: Pt will be able to negotiate 1 flight of stairs reciprocally with min difficulty related to the knees in 6-8 weeks  Current: Able to using step to gait pattern, requires heavy reliance on UE to pull self up  LTG 2: Pt will be able to ambulate 1-2 hours with min difficulty in 6-8 weeks  Current: 45 min (was 30 min)  LTG 3: Pt will be able to transfer from all surfaces in her home with min to no difficulty related to the knees in 6-8 weeks   NEW    Plan  Patient would benefit from: skilled physical therapy  Planned modality interventions: cryotherapy, TENS, unattended electrical stimulation, ultrasound and electrical stimulation/Russian stimulation  Planned therapy interventions: abdominal trunk stabilization, postural training, patient education, neuromuscular re-education, joint mobilization, manual therapy, massage, activity modification, balance, body mechanics training, Chirinos taping, strengthening, stretching, therapeutic activities, coordination, flexibility, home exercise program, therapeutic exercise, functional ROM exercises, gait training, balance/weight bearing training and self care  Frequency: 2x week  Duration in weeks: 4  Treatment plan discussed with: patient        Subjective Evaluation    History of Present Illness  Mechanism of injury: Pt states that since coming to therapy, she is feeling better, reporting a little less pain in the knees  She can also perform more than before, such as positioning the (R) LE in bed while sleeping  Pt continues to report difficulty transferring (anthony after seated for prolonged periods of time) and negotiating stairs  Pain  Current pain ratin  At best pain ratin  At worst pain ratin  Location: Pt reports ARABELLA anterior knee pain, (R) > (L)  Patient Goals  Patient goal: Pt would like to avoid surgery on her knees  Objective     Active Range of Motion   Left Knee   Flexion: 115 degrees   Prone flexion: 95 degrees   Extension: WFL    Right Knee   Flexion: 106 degrees with pain  Prone flexion: 90 degrees   Extension: -20 degrees with pain    Additional Active Range of Motion Details  HS flexibility WNL on (L), mi dec on (R)  LTG: WNL    DF AROM: 8 on (L) (was -5), 5 deg on (R) (was -15)   LT deg ARABELLA    Strength/Myotome Testing     Left Hip   Planes of Motion   Flexion: 4+  Abduction: 3-  External rotation: 4+  Internal rotation: 4-    Right Hip   Planes of Motion   Flexion: 4  Abduction: 3-  External rotation: 5  Internal rotation: 4-    Left Knee   Flexion: 5  Extension: 4+    Right Knee   Flexion: 4+  Extension: 4+    Left Ankle/Foot Dorsiflexion: 5  Plantar flexion: 4+  Inversion: 5  Eversion: 5    Right Ankle/Foot   Dorsiflexion: 5  Plantar flexion: 4  Inversion: 5  Eversion: 5    General Comments:      Knee Comments  (R) tandem stance: 20 secs, no sway  (L) tandem stance: 20 secs, min-mod sway  (R) SLS: 12 secs, min sway, required multiple attempts  (L) SLS: 2 secs, unsteady, required multiple attempts  Precautions: SEE DETAILED PROTOCOL IN CHART - progressions every 2 weeks, no free weights  Date of procedure: 4/29/2021  Hx T/S aortic aneurysm; Sjogren's; interstitial lung disease; knee OA; psoriatic arthritis  Date 6/10 6/14 06/17 06/21 06/24 06/28 7/1      FOTO IE       XX      Re-eval IE      XX          Manuals 6/10 6/14 06/17 06/21 06/24 06/28 7/1      PROM - HS, quads, piriformis, gastrocs  AL 3x30" ea ARABELLA           Joint mobs             Re-eval       AL                   Neuro Re-Ed 6/10 6/14 06/17 06/21 06/24 06/28 7/1      Rockerboard      25ea 30ea                                                                                     Ther Ex 6/10 6/14 06/17 06/21 06/24 06/28 7/1      TO START: Recumbent bike 8' 8' 8' ROM 8' full  8' L0 8' L1  10' L0      TM walk       5'       Prone quad (S)     10"x10 20"x5ea       Prone knee flex AROM     2x10        Supine HS (S) with strap  4x20" HEP           Heelslides             LTRs  10x5" with green SWB d/t p!  In h/L            Tband ankle inv/ev   GTB 20ea (B/L) GTB 30ea (B/L)         SL hip abd             Reverse clams   20ea 30ea  30ea       Clams   20ea 30ea 30ea 30ea       Open books             LAQs  2x10 ea 2x10ea 5"x30ea 5"x30ea 5"x30ea       Hip IR/ER EOB   2x10ea (B/L) 2x15ea (B/L)  2x15ea (B/L)       Wedge gastroc (S)  supine with strap 4x20" ea HEP /c strap 30"x3ea /c strap 30"x3ea 20"x5 wedge        Hip flexor (S) on step             Standing HS Curl      10ea foam                                 Ther Activity 6/10 6/14 06/17 06/21 06/24 06/28 7/1 Gait Training 6/10 6/14 06/17 06/21 06/24 06/28 7/1                                Modalities 6/10 6/14 06/17 06/21 06/24 06/28 7/1      Ice (NO HEAT)  10' ARABELLA knees post  10' (B/L) /c TE 10' (B/L) post 10' (B/L) post       E-STIM             Ultrasound (medial HS, pes anserine)

## 2021-07-06 ENCOUNTER — OFFICE VISIT (OUTPATIENT)
Dept: PHYSICAL THERAPY | Facility: CLINIC | Age: 66
End: 2021-07-06
Payer: COMMERCIAL

## 2021-07-06 DIAGNOSIS — M17.11 OSTEOARTHRITIS OF RIGHT KNEE, UNSPECIFIED OSTEOARTHRITIS TYPE: Primary | ICD-10-CM

## 2021-07-06 DIAGNOSIS — M25.561 RIGHT KNEE PAIN, UNSPECIFIED CHRONICITY: ICD-10-CM

## 2021-07-06 DIAGNOSIS — M25.562 LEFT KNEE PAIN, UNSPECIFIED CHRONICITY: ICD-10-CM

## 2021-07-06 DIAGNOSIS — M17.12 OSTEOARTHRITIS OF LEFT KNEE, UNSPECIFIED OSTEOARTHRITIS TYPE: ICD-10-CM

## 2021-07-06 PROCEDURE — 97110 THERAPEUTIC EXERCISES: CPT

## 2021-07-06 NOTE — PROGRESS NOTES
Daily Note     Today's date: 2021  Patient name: Willie Lanes  : 1955  MRN: [id number]  Referring provider: Dewayne Lee MD  Dx:   Encounter Diagnosis     ICD-10-CM    1  Osteoarthritis of right knee, unspecified osteoarthritis type  M17 11    2  Right knee pain, unspecified chronicity  M25 561    3  Osteoarthritis of left knee, unspecified osteoarthritis type  M17 12    4  Left knee pain, unspecified chronicity  M25 562        Start Time: 1132  Stop Time: 1227  Total time in clinic (min): 55 minutes  Subjective: Pt arrives to Tx reporting (B/L) knees are doing "much better" since IE  Pt states that she still has (B/L) knee pain when attempting to navigate stairs /s step-over-step sequence - pt states she has to navigate stairs daily at home  Objective: See treatment diary below    Assessment: Pt tolerated Tx well - including addition of step ups  Pt demonstrated poor eccentric control during step ups on 2R, therefore remained at 1R to appropriately challenge pt without overstressing knees  Pt notes fatigue post Tx /c some discomfort following LAQ's, potentially from performing at the end of Tx  Pt would benefit from continued PT  Plan:  Cont /c PT POC  Progress as tolerated  Precautions: SEE DETAILED PROTOCOL IN CHART - progressions every 2 weeks, no free weights  Date of procedure: 2021  Hx T/S aortic aneurysm; Sjogren's; interstitial lung disease; knee OA; psoriatic arthritis    Date 6/10 6/14 06/17 06/21 06/24 06/28 7/1 07/06     FOTO IE       XX      Re-eval IE      XX          Manuals 6/10 6/14 06/17 06/21 06/24 06/28 7/1 07/06     PROM - HS, quads, piriformis, gastrocs  AL 3x30" ea ARABELLA           Joint mobs             Re-eval       AL                   Neuro Re-Ed 6/10 6/14 06/17 06/21 06/24 06/28 7/1 07/06     Rockerboard      25ea 30ea  30ea                                                                                   Ther Ex 6/10 6/14 06/17 06/21 06/24 06/28 7/1 07/06 TO START: Recumbent bike 8' 8' 8' ROM 8' full  8' L0 8' L1  10' L0 10' L0     TM walk       5'       Prone quad (S)     10"x10 20"x5ea       Prone knee flex AROM     2x10        Supine HS (S) with strap  4x20" HEP           Heelslides             LTRs  10x5" with green SWB d/t p!  In h/L            Tband ankle inv/ev   GTB 20ea (B/L) GTB 30ea (B/L)         SL hip abd             Reverse clams   20ea 30ea  30ea       Clams   20ea 30ea 30ea 30ea       Open books             LAQs  2x10 ea 2x10ea 5"x30ea 5"x30ea 5"x30ea  5"x20ea     Hip IR/ER EOB   2x10ea (B/L) 2x15ea (B/L)  2x15ea (B/L)  2x15ea (B/L)     Wedge gastroc (S)  supine with strap 4x20" ea HEP /c strap 30"x3ea /c strap 30"x3ea 20"x5 wedge        Hip flexor (S) on step             Standing HS Curl      10ea foam  30ea foam     Step Ups        0Rx10  1Rx20 (B/L)                  Ther Activity 6/10 6/14 06/17 06/21 06/24 06/28 7/1 07/06                               Gait Training 6/10 6/14 06/17 06/21 06/24 06/28 7/1 07/06                               Modalities 6/10 6/14 06/17 06/21 06/24 06/28 7/1 07/06     Ice (NO HEAT)  10' ARABELLA knees post  10' (B/L) /c TE 10' (B/L) post 10' (B/L) post  10' (B/L) post     E-STIM             Ultrasound (medial HS, pes anserine)

## 2021-07-07 ENCOUNTER — OFFICE VISIT (OUTPATIENT)
Dept: FAMILY MEDICINE CLINIC | Facility: CLINIC | Age: 66
End: 2021-07-07
Payer: COMMERCIAL

## 2021-07-07 VITALS
DIASTOLIC BLOOD PRESSURE: 72 MMHG | BODY MASS INDEX: 36.23 KG/M2 | WEIGHT: 212.2 LBS | SYSTOLIC BLOOD PRESSURE: 118 MMHG | HEIGHT: 64 IN | TEMPERATURE: 97.4 F

## 2021-07-07 DIAGNOSIS — J84.9 ILD (INTERSTITIAL LUNG DISEASE) (HCC): ICD-10-CM

## 2021-07-07 DIAGNOSIS — M17.0 PRIMARY OSTEOARTHRITIS OF BOTH KNEES: Primary | ICD-10-CM

## 2021-07-07 DIAGNOSIS — E55.9 VITAMIN D DEFICIENCY: ICD-10-CM

## 2021-07-07 DIAGNOSIS — Z13.6 SCREENING FOR CARDIOVASCULAR CONDITION: ICD-10-CM

## 2021-07-07 DIAGNOSIS — M35.02 SJOGREN'S SYNDROME WITH LUNG INVOLVEMENT (HCC): ICD-10-CM

## 2021-07-07 PROBLEM — M25.571 ACUTE RIGHT ANKLE PAIN: Status: RESOLVED | Noted: 2021-03-25 | Resolved: 2021-07-07

## 2021-07-07 PROCEDURE — 1036F TOBACCO NON-USER: CPT | Performed by: FAMILY MEDICINE

## 2021-07-07 PROCEDURE — 99214 OFFICE O/P EST MOD 30 MIN: CPT | Performed by: FAMILY MEDICINE

## 2021-07-07 PROCEDURE — 1160F RVW MEDS BY RX/DR IN RCRD: CPT | Performed by: FAMILY MEDICINE

## 2021-07-07 PROCEDURE — 3008F BODY MASS INDEX DOCD: CPT | Performed by: FAMILY MEDICINE

## 2021-07-07 NOTE — PROGRESS NOTES
Assessment/Plan:    Sjogren's syndrome with lung involvement [facility])    Follow-up with Rheumatology and Pulmonary as scheduled  ILD (interstitial lung disease) ([facility] )    Follow-up pulmonary    Vitamin D deficiency   Continue current supplementation  Diagnoses and all orders for this visit:    Primary osteoarthritis of both knees    Sjogren's syndrome with lung involvement ([facility] )  -     Comprehensive metabolic panel; Future  -     CBC and differential; Future    Vitamin D deficiency  -     Vitamin D 25 hydroxy; Future    ILD (interstitial lung disease) ([place] [address] )    Screening for cardiovascular condition  -     Lipid panel; Future          Subjective:   Chief Complaint   Patient presents with    Follow-up     sjogren's syndrome           Patient ID: Jyoti Del Cid is a 77 y o  female  She is here for follow-up on her Sjogren's syndrome, interstitial lung disease, weight, arthritis, and a general checkup  She had recent lab work which he would like to review  She has not gotten a COVID vaccine yet, she is apprehensive about it due to the reports on TV and the recommendations of a television doctor  However, all of her local doctors including her rheumatologist, pulmonologist, and myself are in agreement that she should get it  She will take this under advisement  The following portions of the patient's history were reviewed and updated as appropriate: allergies, current medications, past family history, past medical history, past social history, past surgical history and problem list     Review of Systems   Constitutional: Negative for activity change, chills, diaphoresis, fatigue, fever and unexpected weight change  HENT: Negative for congestion, ear pain, hearing loss, nosebleeds, postnasal drip, rhinorrhea, sinus pressure, sore throat and tinnitus  Eyes: Negative for photophobia, pain, discharge, redness and visual disturbance     Respiratory: Negative for cough, chest tightness, shortness of breath and wheezing  Cardiovascular: Negative for chest pain, palpitations and leg swelling  Denies DUNN   Gastrointestinal: Negative for abdominal pain, blood in stool, constipation, diarrhea, nausea and vomiting  Endocrine: Negative  Genitourinary: Negative for difficulty urinating, dysuria, frequency, hematuria and urgency  Musculoskeletal: Positive for arthralgias, back pain, gait problem and joint swelling  Negative for myalgias  Skin: Negative for rash and wound  Denies skin lesions, change in birthmarks   Allergic/Immunologic: Negative for environmental allergies, food allergies and immunocompromised state  Neurological: Negative for dizziness, tremors, seizures, syncope, weakness, numbness and headaches  Hematological: Negative  Psychiatric/Behavioral: Negative for behavioral problems, confusion, decreased concentration and sleep disturbance  The patient is not nervous/anxious  Objective:      /72   Temp (!) 97 4 °F (36 3 °C)   Ht 5' 4" (1 626 m)   Wt 96 3 kg (212 lb 3 2 oz)   BMI 36 42 kg/m²          Physical Exam  Vitals and nursing note reviewed  Constitutional:       General: She is not in acute distress  Appearance: She is well-developed  She is obese  HENT:      Head: Normocephalic and atraumatic  Right Ear: Tympanic membrane, ear canal and external ear normal       Left Ear: Tympanic membrane, ear canal and external ear normal       Nose: Nose normal    Eyes:      Conjunctiva/sclera: Conjunctivae normal       Pupils: Pupils are equal, round, and reactive to light  Neck:      Thyroid: No thyromegaly  Vascular: No JVD  Trachea: No tracheal deviation  Cardiovascular:      Rate and Rhythm: Normal rate and regular rhythm  Heart sounds: Normal heart sounds  No murmur heard  Pulmonary:      Effort: Pulmonary effort is normal       Breath sounds: Normal breath sounds  No wheezing or rales     Abdominal:      General: Bowel sounds are normal       Palpations: Abdomen is soft  There is no mass  Tenderness: There is no abdominal tenderness  There is no guarding or rebound  Musculoskeletal:         General: No tenderness  Cervical back: Normal range of motion and neck supple  Lymphadenopathy:      Cervical: No cervical adenopathy  Skin:     General: Skin is warm and dry  Capillary Refill: Capillary refill takes less than 2 seconds  Findings: No lesion or rash  Neurological:      General: No focal deficit present  Mental Status: She is alert and oriented to person, place, and time  Cranial Nerves: No cranial nerve deficit  Deep Tendon Reflexes: Reflexes are normal and symmetric  Psychiatric:         Mood and Affect: Mood normal          Thought Content: Thought content normal          Judgment: Judgment normal        BMI Counseling: Body mass index is 36 42 kg/m²  The BMI is above normal  Nutrition recommendations include moderation in carbohydrate intake, increasing intake of lean protein and reducing intake of saturated and trans fat  Exercise recommendations include exercising 3-5 times per week

## 2021-07-08 ENCOUNTER — OFFICE VISIT (OUTPATIENT)
Dept: PHYSICAL THERAPY | Facility: CLINIC | Age: 66
End: 2021-07-08
Payer: COMMERCIAL

## 2021-07-08 DIAGNOSIS — M25.562 LEFT KNEE PAIN, UNSPECIFIED CHRONICITY: ICD-10-CM

## 2021-07-08 DIAGNOSIS — M17.12 OSTEOARTHRITIS OF LEFT KNEE, UNSPECIFIED OSTEOARTHRITIS TYPE: ICD-10-CM

## 2021-07-08 DIAGNOSIS — M25.561 RIGHT KNEE PAIN, UNSPECIFIED CHRONICITY: ICD-10-CM

## 2021-07-08 DIAGNOSIS — M17.11 OSTEOARTHRITIS OF RIGHT KNEE, UNSPECIFIED OSTEOARTHRITIS TYPE: Primary | ICD-10-CM

## 2021-07-08 PROCEDURE — 97110 THERAPEUTIC EXERCISES: CPT

## 2021-07-08 NOTE — PROGRESS NOTES
Daily Note     Today's date: 2021  Patient name: Lang Malik  : 1955  MRN: [id number]  Referring provider: Emily Rucker MD  Dx:   Encounter Diagnosis     ICD-10-CM    1  Osteoarthritis of right knee, unspecified osteoarthritis type  M17 11    2  Right knee pain, unspecified chronicity  M25 561    3  Osteoarthritis of left knee, unspecified osteoarthritis type  M17 12    4  Left knee pain, unspecified chronicity  M25 562                   Subjective: Pt states that the knees are feeling good today, stating that she notices improvement since obtaining the stem cell procedure  Objective: See treatment diary below      Assessment: Tolerated treatment well  Pt able to complete addition of mini squats and remainder of exercise program today without c/o pain  VC provided during today's session for prompting and occasionally for recall  Educated pt today about goals of exercises and issued pt updated HEP  Patient demonstrated fatigue post treatment, exhibited good technique with therapeutic exercises and would benefit from continued PT to progress LE strength/stability to improve pt's tolerance to ambulating daily  Plan: Continue per plan of care  Progress treament per protocol  Precautions: SEE DETAILED PROTOCOL IN CHART - progressions every 2 weeks, no free weights  Date of procedure: 2021  Hx T/S aortic aneurysm; Sjogren's; interstitial lung disease; knee OA; psoriatic arthritis    Date 6/10 6/14 06/17 06/21 06/24 06/28 7/1 07/06     FOTO IE       XX      Re-eval IE      XX          Manuals 6/10 6/14 06/17 06/21 06/24 06/28 7/1 07/06 7/8    PROM - HS, quads, piriformis, gastrocs  AL 3x30" ea ARABELLA           Joint mobs             Re-eval       AL                   Neuro Re-Ed 6/10 6/14 06/17 06/21 06/24 06/28 7/1 07/06 7/8    Rockerboard      25ea 30ea  30ea                                                                                   Ther Ex 6/10 6/14 06/17 06/21 06/24 06/28 7/1 07/06 7/8    TO START: Recumbent bike 8' 8' 8' ROM 8' full  8' L0 8' L1  10' L0 10' L0 15' L0    TM walk       5'   5'    Prone quad (S)     10"x10 20"x5ea       Prone knee flex AROM     2x10        Supine HS (S) with strap  4x20" HEP           Heelslides             LTRs  10x5" with green SWB d/t p!  In h/L            Tband ankle inv/ev   GTB 20ea (B/L) GTB 30ea (B/L)         SL hip abd             Reverse clams   20ea 30ea  30ea   30x ea    Clams   20ea 30ea 30ea 30ea   30x ea    Open books             LAQs  2x10 ea 2x10ea 5"x30ea 5"x30ea 5"x30ea  5"x20ea 2x15 ea    Hip IR/ER EOB   2x10ea (B/L) 2x15ea (B/L)  2x15ea (B/L)  2x15ea (B/L) 2x15 ea ARABELLA    Wedge gastroc (S)  supine with strap 4x20" ea HEP /c strap 30"x3ea /c strap 30"x3ea 20"x5 wedge        Hip flexor (S) on step             Standing HS Curl      10ea foam  30ea foam 2x15 ea     Step Ups        0Rx10  1Rx20 (B/L) 1R 20x ARABELLA    Mini squats         2x10 pain-free range    Pt Edu         AL HEP                 Ther Activity 6/10 6/14 06/17 06/21 06/24 06/28 7/1 07/06 7/8                              Gait Training 6/10 6/14 06/17 06/21 06/24 06/28 7/1 07/06 7/8                              Modalities 6/10 6/14 06/17 06/21 06/24 06/28 7/1 07/06 7/8    Ice (NO HEAT)  10' ARABELLA knees post  10' (B/L) /c TE 10' (B/L) post 10' (B/L) post  10' (B/L) post 8' ARABELLA post    E-STIM             Ultrasound (medial HS, pes anserine)

## 2021-07-12 ENCOUNTER — OFFICE VISIT (OUTPATIENT)
Dept: PHYSICAL THERAPY | Facility: CLINIC | Age: 66
End: 2021-07-12
Payer: COMMERCIAL

## 2021-07-12 DIAGNOSIS — M25.561 RIGHT KNEE PAIN, UNSPECIFIED CHRONICITY: ICD-10-CM

## 2021-07-12 DIAGNOSIS — M17.11 OSTEOARTHRITIS OF RIGHT KNEE, UNSPECIFIED OSTEOARTHRITIS TYPE: Primary | ICD-10-CM

## 2021-07-12 DIAGNOSIS — M17.12 OSTEOARTHRITIS OF LEFT KNEE, UNSPECIFIED OSTEOARTHRITIS TYPE: ICD-10-CM

## 2021-07-12 DIAGNOSIS — M25.562 LEFT KNEE PAIN, UNSPECIFIED CHRONICITY: ICD-10-CM

## 2021-07-12 PROCEDURE — 97110 THERAPEUTIC EXERCISES: CPT

## 2021-07-12 NOTE — PROGRESS NOTES
Daily Note     Today's date: 2021  Patient name: Melinda Mares  : 1955  MRN: [id number]  Referring provider: Nick Lucas MD  Dx:   Encounter Diagnosis     ICD-10-CM    1  Osteoarthritis of right knee, unspecified osteoarthritis type  M17 11    2  Right knee pain, unspecified chronicity  M25 561    3  Osteoarthritis of left knee, unspecified osteoarthritis type  M17 12    4  Left knee pain, unspecified chronicity  M25 562        Start Time: 1230  Stop Time: 1314  Total time in clinic (min): 44 minutes  Subjective: Pt denies DOMS following Lv  Pt reports her knees have been feeling "really good" since LV; however, notes increased (R) knee pain Sx /c insidious onset over the weekend (approx 2 days ago)  Objective: See treatment diary below    Assessment: Tolerated treatment well  Pt demonstrates fair form during mini squats - requiring cuing to reduce trunk movement and speed  Reduced step height during step ups 2/2 pt reports of increased (R) knee pain - notes improved tolerance /c modification  Pt required minimal verbal and tactile cuing to reduce trunk rotation compensation during S/L clamshells - good carryover /c pt noting increased glute muscle recruitment  Pt reports her (B/L) knee pain Sx are feeling "better" post Tx - discussed continuing to progress as tolerated  Pt demonstrated fatigue post treatment, exhibited good technique with therapeutic exercises and would benefit from continued PT to progress LE strength/stability to improve pt's tolerance to ambulating daily  Plan: Cont /c PT POC  Progress as tolerated  Precautions: SEE DETAILED PROTOCOL IN CHART - progressions every 2 weeks, no free weights  Date of procedure: 2021  Hx T/S aortic aneurysm; Sjogren's; interstitial lung disease; knee OA; psoriatic arthritis    Date 6/10 6/14 06/17 06/21 06/24 06/28 7/1 07/06  07/12   FOTO IE       XX      Re-eval IE      XX          Manuals 6/10 6/14 06/17 06/21 06/24 06/28 7/1 07/06 7/8 07/12   PROM - HS, quads, piriformis, gastrocs  AL 3x30" ea ARABELLA           Joint mobs             Re-eval       AL                   Neuro Re-Ed 6/10 6/14 06/17 06/21 06/24 06/28 7/1 07/06 7/8 07/12   Rockerboard      25ea 30ea  30ea                                                                                   Ther Ex 6/10 6/14 06/17 06/21 06/24 06/28 7/1 07/06 7/8 07/12   TO START: Recumbent bike 8' 8' 8' ROM 8' full  8' L0 8' L1  10' L0 10' L0 15' L0 8' L0   TM walk       5'   5'    Prone quad (S)     10"x10 20"x5ea       Prone knee flex AROM     2x10        Supine HS (S) with strap  4x20" HEP           Heelslides             LTRs  10x5" with green SWB d/t p!  In h/L            Tband ankle inv/ev   GTB 20ea (B/L) GTB 30ea (B/L)         SL hip abd             Reverse clams   20ea 30ea  30ea   30x ea 30ea   Clams   20ea 30ea 30ea 30ea   30x ea 30ea   Open books             LAQs  2x10 ea 2x10ea 5"x30ea 5"x30ea 5"x30ea  5"x20ea 2x15 ea    Hip IR/ER EOB   2x10ea (B/L) 2x15ea (B/L)  2x15ea (B/L)  2x15ea (B/L) 2x15 ea ARABELLA 30ea (B/L)   Wedge gastroc (S)  supine with strap 4x20" ea HEP /c strap 30"x3ea /c strap 30"x3ea 20"x5 wedge        Hip flexor (S) on step             Standing HS Curl      10ea foam  30ea foam 2x15 ea  30ea foam   Step Ups        0Rx10  1Rx20 (B/L) 1R 20x ARABELLA 0R 30ea   Mini squats         2x10 pain-free range 2x10   Pt Edu         AL HEP                 Ther Activity 6/10 6/14 06/17 06/21 06/24 06/28 7/1 07/06 7/8 07/12                             Gait Training 6/10 6/14 06/17 06/21 06/24 06/28 7/1 07/06 7/8 07/12                             Modalities 6/10 6/14 06/17 06/21 06/24 06/28 7/1 07/06 7/8 07/12   Ice (NO HEAT)  10' ARABELLA knees post  10' (B/L) /c TE 10' (B/L) post 10' (B/L) post  10' (B/L) post 8' ARABELLA post 8' (B/L) post   E-STIM             Ultrasound (medial HS, pes anserine)                 Jamey Noland, PTA

## 2021-07-15 ENCOUNTER — OFFICE VISIT (OUTPATIENT)
Dept: PHYSICAL THERAPY | Facility: CLINIC | Age: 66
End: 2021-07-15
Payer: COMMERCIAL

## 2021-07-15 DIAGNOSIS — M17.11 OSTEOARTHRITIS OF RIGHT KNEE, UNSPECIFIED OSTEOARTHRITIS TYPE: Primary | ICD-10-CM

## 2021-07-15 DIAGNOSIS — M25.561 RIGHT KNEE PAIN, UNSPECIFIED CHRONICITY: ICD-10-CM

## 2021-07-15 DIAGNOSIS — M25.562 LEFT KNEE PAIN, UNSPECIFIED CHRONICITY: ICD-10-CM

## 2021-07-15 DIAGNOSIS — M17.12 OSTEOARTHRITIS OF LEFT KNEE, UNSPECIFIED OSTEOARTHRITIS TYPE: ICD-10-CM

## 2021-07-15 PROCEDURE — 97110 THERAPEUTIC EXERCISES: CPT

## 2021-07-15 NOTE — PROGRESS NOTES
Daily Note     Today's date: 7/15/2021  Patient name: Travon Peters  : 1955  MRN: [id number]  Referring provider: Mildred Burton MD  Dx:   Encounter Diagnosis     ICD-10-CM    1  Osteoarthritis of right knee, unspecified osteoarthritis type  M17 11    2  Right knee pain, unspecified chronicity  M25 561    3  Osteoarthritis of left knee, unspecified osteoarthritis type  M17 12    4  Left knee pain, unspecified chronicity  M25 562        Start Time: 1530  Stop Time: 1620  Total time in clinic (min): 50 minutes  Subjective: Pt reports her knees are feeling "a little better" since LV - LV pt arrived noting increased (R) pain Sx /c insidious onset over the weekend (approx 5 days ago)  Objective: See treatment diary below    Assessment: Tolerated treatment well  Despite tactile and verbal cuing + chair for cuing, pt demonstrated fair form /c minisquats - therefor modified to STS on hi-low  Pt tolerated addition of 2# ankle weights during exercises today well today, noting "it feels good"  Pt demonstrates sufficient challenge /c addition of S/L hip abd - demonstrating difficulty reaching full range  Pt demonstrated fatigue post treatment, exhibited good technique with therapeutic exercises and would benefit from continued PT to progress LE strength/stability to improve pt's tolerance to ambulating daily  Plan: Cont /c PT POC  Progress as tolerated  Precautions: SEE DETAILED PROTOCOL IN CHART - progressions every 2 weeks, no free weights  Date of procedure: 2021  Hx T/S aortic aneurysm; Sjogren's; interstitial lung disease; knee OA; psoriatic arthritis    Date 06/17 06/21 06/24 06/28 7/1 07/06  07/12 07/15    FOTO      XX        Re-eval     XX            Manuals 06/17 06/21 06/24 06/28 7/1 07/06 7/8 07/12 07/15    PROM - HS, quads, piriformis, gastrocs             Joint mobs             Re-eval     AL                     Neuro Re-Ed 06/17 06/21 06/24 06/28 7/1 07/06 7/8 07/12 07/15    Rockerboard 25ea 30ea  30ea                                                                                     Ther Ex 06/17 06/21 06/24 06/28 7/1 07/06 7/8 07/12 07/15    TO START: Recumbent bike 8' ROM 8' full  8' L0 8' L1  10' L0 10' L0 15' L0 8' L0 8' L0    TM walk     5'   5'      Prone quad (S)   10"x10 20"x5ea         Prone knee flex AROM   2x10          Supine HS (S) with strap             Heelslides             LTRs             Tband ankle inv/ev GTB 20ea (B/L) GTB 30ea (B/L)           SL hip abd         10ea    Reverse clams 20ea 30ea  30ea   30x ea 30ea 2#x20ea    Clams 20ea 30ea 30ea 30ea   30x ea 30ea 30ea    Open books             LAQs 2x10ea 5"x30ea 5"x30ea 5"x30ea  5"x20ea 2x15 ea  2#x20ea    Hip IR/ER EOB 2x10ea (B/L) 2x15ea (B/L)  2x15ea (B/L)  2x15ea (B/L) 2x15 ea ARABELLA 30ea (B/L) 2#x20ea (B/L)    Wedge gastroc (S) /c strap 30"x3ea /c strap 30"x3ea 20"x5 wedge          Hip flexor (S) on step             Standing HS Curl    10ea foam  30ea foam 2x15 ea  30ea foam 2#x20ea foam    Step Ups      0Rx10  1Rx20 (B/L) 1R 20x ARABELLA 0R 30ea     Mini squats       2x10 pain-free range 2x10 STS 20x hi/low    Pt Edu       AL HEP                   Ther Activity 06/17 06/21 06/24 06/28 7/1 07/06 7/8 07/12 07/15                              Gait Training 06/17 06/21 06/24 06/28 7/1 07/06 7/8 07/12 07/15                              Modalities 06/17 06/21 06/24 06/28 7/1 07/06 7/8 07/12 07/15    Ice (NO HEAT)  10' (B/L) /c TE 10' (B/L) post 10' (B/L) post  10' (B/L) post 8' ARABELLA post 8' (B/L) post 10' (B/L) post    E-STIM             Ultrasound (medial HS, pes anserine)                 Alyson Cash, PTA

## 2021-07-19 ENCOUNTER — EVALUATION (OUTPATIENT)
Dept: PHYSICAL THERAPY | Facility: CLINIC | Age: 66
End: 2021-07-19
Payer: COMMERCIAL

## 2021-07-19 DIAGNOSIS — M17.11 OSTEOARTHRITIS OF RIGHT KNEE, UNSPECIFIED OSTEOARTHRITIS TYPE: Primary | ICD-10-CM

## 2021-07-19 DIAGNOSIS — M25.561 RIGHT KNEE PAIN, UNSPECIFIED CHRONICITY: ICD-10-CM

## 2021-07-19 DIAGNOSIS — M17.12 OSTEOARTHRITIS OF LEFT KNEE, UNSPECIFIED OSTEOARTHRITIS TYPE: ICD-10-CM

## 2021-07-19 DIAGNOSIS — M25.562 LEFT KNEE PAIN, UNSPECIFIED CHRONICITY: ICD-10-CM

## 2021-07-19 PROCEDURE — 97110 THERAPEUTIC EXERCISES: CPT

## 2021-07-19 PROCEDURE — 97140 MANUAL THERAPY 1/> REGIONS: CPT

## 2021-07-19 NOTE — LETTER
2021    Carlos Parekh MD  [facility] [address]    Patient: Mark Carias   YOB: 1955   Date of Visit: 2021     Encounter Diagnosis     ICD-10-CM    1  Osteoarthritis of right knee, unspecified osteoarthritis type  M17 11    2  Right knee pain, unspecified chronicity  M25 561    3  Osteoarthritis of left knee, unspecified osteoarthritis type  M17 12    4  Left knee pain, unspecified chronicity  M25 562        Dear Dr Melissa Osorio: Thank you for your recent referral of Mark Carias  Please review the attached evaluation summary from Jessica's recent visit  Please verify that you agree with the plan of care by signing the attached order  If you have any questions or concerns, please do not hesitate to call  I sincerely appreciate the opportunity to share in the care of one of your patients and hope to have another opportunity to work with you in the near future  Sincerely,    Trevon Cortez, PT      Referring Provider:      I certify that I have read the below Plan of Care and certify the need for these services furnished under this plan of treatment while under my care  Carlos Parekh MD  [facility] [address]  Via Fax: [phone]          PT Re-Evaluation     Today's date: 2021  Patient name: Mark Carias  : 1955  MRN: [id number]  Referring provider: Tima Alexander MD  Dx:   Encounter Diagnosis     ICD-10-CM    1  Osteoarthritis of right knee, unspecified osteoarthritis type  M17 11    2  Right knee pain, unspecified chronicity  M25 561    3  Osteoarthritis of left knee, unspecified osteoarthritis type  M17 12    4  Left knee pain, unspecified chronicity  M25 562                   Assessment  Assessment details: To date, Ms Savita Lieberman has participated in a total of 11 skilled therapy sessions for tx of ARABELLA knee OA  Upon reassessment today, she is continuing to make steady progress toward her goals   Objectively, she shows increasing lower quarter stability; increasing LE strength; increased (R) knee ext/flex AROM; and now shows normalized HS flexibility on the (R)  Subjectively, she reports decreasing pain levels daily and an improving ability walking prolonged periods of time and negotiating stairs- she is now able to do so reciprocally  She remains limited with negotiating stairs reciprocally d/t pain and heavy reliance on UE  She also continues to report difficulty transferring daily  She continues to present with decreased knee AROM ARABELLA;  decreased quad flexibility; decreased LE strength (particularly thru ARABELLA glute med); and decreased SL stability particularly on the (L)  She would therefore benefit from 2-4 more weeks of PT intervention in order to address the aforementioned deficits with goal to then d/c to HEP so that she can return to her PLOF and function comfortably/safely in her home and surrounding environment  Impairments: abnormal gait, abnormal or restricted ROM, abnormal movement, impaired balance, impaired physical strength, pain with function and poor body mechanics  Understanding of Dx/Px/POC: good   Prognosis: good    Goals  STG 1: Pt will demonstrate compliance with HEP to supplement therapy in 1-2 weeks  PROGRESSING  STG 2: Pt will report 25% reduction in pain in 2-4 weeks  PROGRESSING   LTG 1: Pt will be able to negotiate 1 flight of stairs reciprocally with min difficulty related to the knees in 6-8 weeks  PROGRESSING - pt now able to do so using a reciprocal pattern, continues to require UE assist and reports difficulty d/t pain  LTG 2: Pt will be able to ambulate 1-2 hours with min difficulty in 6-8 weeks  Current: 1 hour (was 45 min)  LTG 3: Pt will be able to transfer from all surfaces in her home with min to no difficulty related to the knees in 6-8 weeks   NOT MET    Plan  Patient would benefit from: skilled physical therapy  Planned modality interventions: cryotherapy, TENS, unattended electrical stimulation, ultrasound and electrical stimulation/Russian stimulation  Planned therapy interventions: abdominal trunk stabilization, postural training, patient education, neuromuscular re-education, joint mobilization, manual therapy, massage, activity modification, balance, body mechanics training, Chirinos taping, strengthening, stretching, therapeutic activities, coordination, flexibility, home exercise program, therapeutic exercise, functional ROM exercises, gait training, balance/weight bearing training and self care  Frequency: 2x week  Duration in weeks: 4  Treatment plan discussed with: patient        Subjective Evaluation    History of Present Illness  Mechanism of injury: Pt states that today she is "not good," reporting increased pain thru the (R) knee of insidious onset  Since coming to therapy, she notes an improving ability negotiating stairs "a little bit," stating that she can now do so reciprocally but with pain  She also feels like she is walking well  She reports continued difficulty negotiating stairs and transferring anthony from her bed  She remarks that the standing up/sitting down exercise has "really been helping "  Pain  Current pain ratin  At best pain ratin  At worst pain ratin  Location: Pt reports ARABELLA anterior knee pain, (R) > (L)  Patient Goals  Patient goal: Pt would like to avoid surgery on her knees  Objective     Active Range of Motion   Left Knee   Flexion: 118 degrees   Prone flexion: 95 degrees   Extension: WFL    Right Knee   Flexion: 115 degrees   Prone flexion: 90 degrees   Extension: -12 degrees     Additional Active Range of Motion Details  HS flexibility WNL ARABELLA  LTG: WNL    DF AROM: 10 on (L) (was 8), 10 deg on (R) (was 5)   LT deg ARABELLA    Strength/Myotome Testing     Left Hip   Planes of Motion   Flexion: 5  Abduction: 3  External rotation: 4+  Internal rotation: 4+    Right Hip   Planes of Motion   Flexion: 4  Abduction: 4-  External rotation: 5  Internal rotation: 4+    Left Knee   Flexion: 5  Extension: 5    Right Knee   Flexion: 5  Extension: 4+    Left Ankle/Foot   Dorsiflexion: 5  Plantar flexion: 4+  Inversion: 5  Eversion: 5    Right Ankle/Foot   Dorsiflexion: 5  Plantar flexion: 4  Inversion: 5  Eversion: 5    General Comments:      Knee Comments  (R) tandem stance: 20 secs, no sway  (L) tandem stance: 20 secs, min sway  (R) SLS: 15 secs, min sway, required multiple attempts  (L) SLS: 2 secs, unsteady, required multiple attempts  Precautions: SEE DETAILED PROTOCOL IN CHART - progressions every 2 weeks, no free weights  Date of procedure: 4/29/2021  Hx T/S aortic aneurysm; Sjogren's; interstitial lung disease; knee OA; psoriatic arthritis    Date 06/17 06/21 06/24 06/28 7/1 07/06  07/12 07/15 7/19   FOTO      XX     XX   Re-eval     XX     XX       Manuals 06/17 06/21 06/24 06/28 7/1 07/06 7/8 07/12 07/15 7/19   PROM - HS, quads, piriformis, gastrocs             Joint mobs             Re-sj     AL     AL                Neuro Re-Ed 06/17 06/21 06/24 06/28 7/1 07/06 7/8 07/12 07/15 7/19   Rockerboard    25ea 30ea  30ea                                                                                     Ther Ex 06/17 06/21 06/24 06/28 7/1 07/06 7/8 07/12 07/15 7/19   TO START: Recumbent bike 8' ROM 8' full  8' L0 8' L1  10' L0 10' L0 15' L0 8' L0 8' L0 10'  L0    TM walk     5'   5'      Prone quad (S)   10"x10 20"x5ea         Prone knee flex AROM   2x10          Supine HS (S) with strap             Heelslides             LTRs             Tband ankle inv/ev GTB 20ea (B/L) GTB 30ea (B/L)           SL hip abd         10ea 2x15 ea   Reverse clams 20ea 30ea  30ea   30x ea 30ea 2#x20ea    Clams 20ea 30ea 30ea 30ea   30x ea 30ea 30ea    Open books             LAQs 2x10ea 5"x30ea 5"x30ea 5"x30ea  5"x20ea 2x15 ea  2#x20ea    Hip IR/ER EOB 2x10ea (B/L) 2x15ea (B/L)  2x15ea (B/L)  2x15ea (B/L) 2x15 ea ARABELLA 30ea (B/L) 2#x20ea (B/L) Wedge gastroc (S) /c strap 30"x3ea /c strap 30"x3ea 20"x5 wedge          Hip flexor (S) on step             Standing HS Curl    10ea foam  30ea foam 2x15 ea  30ea foam 2#x20ea foam    Step Ups      0Rx10  1Rx20 (B/L) 1R 20x ARABELLA 0R 30ea     Mini squats       2x10 pain-free range 2x10 STS 20x hi/low STS 20x hi/low   Pt Edu       AL HEP                   Ther Activity 06/17 06/21 06/24 06/28 7/1 07/06 7/8 07/12 07/15 7/19                             Gait Training 06/17 06/21 06/24 06/28 7/1 07/06 7/8 07/12 07/15 7/19                             Modalities 06/17 06/21 06/24 06/28 7/1 07/06 7/8 07/12 07/15 7/19   Ice (NO HEAT)  10' (B/L) /c TE 10' (B/L) post 10' (B/L) post  10' (B/L) post 8' ARABELLA post 8' (B/L) post 10' (B/L) post deferred   E-STIM             Ultrasound (medial HS, pes anserine)

## 2021-07-19 NOTE — PROGRESS NOTES
PT Re-Evaluation     Today's date: 2021  Patient name: Jasen Dean  : 1955  MRN: [id number]  Referring provider: Emily Zaman MD  Dx:   Encounter Diagnosis     ICD-10-CM    1  Osteoarthritis of right knee, unspecified osteoarthritis type  M17 11    2  Right knee pain, unspecified chronicity  M25 561    3  Osteoarthritis of left knee, unspecified osteoarthritis type  M17 12    4  Left knee pain, unspecified chronicity  M25 562                   Assessment  Assessment details: To date, Ms Laurita Ta has participated in a total of 11 skilled therapy sessions for tx of ARABELLA knee OA  Upon reassessment today, she is continuing to make steady progress toward her goals  Objectively, she shows increasing lower quarter stability; increasing LE strength; increased (R) knee ext/flex AROM; and now shows normalized HS flexibility on the (R)  Subjectively, she reports decreasing pain levels daily and an improving ability walking prolonged periods of time and negotiating stairs- she is now able to do so reciprocally  She remains limited with negotiating stairs reciprocally d/t pain and heavy reliance on UE  She also continues to report difficulty transferring daily  She continues to present with decreased knee AROM ARABELLA;  decreased quad flexibility; decreased LE strength (particularly thru ARABELLA glute med); and decreased SL stability particularly on the (L)  She would therefore benefit from 2-4 more weeks of PT intervention in order to address the aforementioned deficits with goal to then d/c to HEP so that she can return to her PLOF and function comfortably/safely in her home and surrounding environment  Impairments: abnormal gait, abnormal or restricted ROM, abnormal movement, impaired balance, impaired physical strength, pain with function and poor body mechanics  Understanding of Dx/Px/POC: good   Prognosis: good    Goals  STG 1: Pt will demonstrate compliance with HEP to supplement therapy in 1-2 weeks   PROGRESSING  STG 2: Pt will report 25% reduction in pain in 2-4 weeks  PROGRESSING   LTG 1: Pt will be able to negotiate 1 flight of stairs reciprocally with min difficulty related to the knees in 6-8 weeks  PROGRESSING - pt now able to do so using a reciprocal pattern, continues to require UE assist and reports difficulty d/t pain  LTG 2: Pt will be able to ambulate 1-2 hours with min difficulty in 6-8 weeks  Current: 1 hour (was 45 min)  LTG 3: Pt will be able to transfer from all surfaces in her home with min to no difficulty related to the knees in 6-8 weeks  NOT MET    Plan  Patient would benefit from: skilled physical therapy  Planned modality interventions: cryotherapy, TENS, unattended electrical stimulation, ultrasound and electrical stimulation/Russian stimulation  Planned therapy interventions: abdominal trunk stabilization, postural training, patient education, neuromuscular re-education, joint mobilization, manual therapy, massage, activity modification, balance, body mechanics training, Chirinos taping, strengthening, stretching, therapeutic activities, coordination, flexibility, home exercise program, therapeutic exercise, functional ROM exercises, gait training, balance/weight bearing training and self care  Frequency: 2x week  Duration in weeks: 4  Treatment plan discussed with: patient        Subjective Evaluation    History of Present Illness  Mechanism of injury: Pt states that today she is "not good," reporting increased pain thru the (R) knee of insidious onset  Since coming to therapy, she notes an improving ability negotiating stairs "a little bit," stating that she can now do so reciprocally but with pain  She also feels like she is walking well  She reports continued difficulty negotiating stairs and transferring anthony from her bed      She remarks that the standing up/sitting down exercise has "really been helping "  Pain  Current pain ratin  At best pain ratin  At worst pain rating: 6  Location: Pt reports ARABELLA anterior knee pain, (R) > (L)  Patient Goals  Patient goal: Pt would like to avoid surgery on her knees  Objective     Active Range of Motion   Left Knee   Flexion: 118 degrees   Prone flexion: 95 degrees   Extension: WFL    Right Knee   Flexion: 115 degrees   Prone flexion: 90 degrees   Extension: -12 degrees     Additional Active Range of Motion Details  HS flexibility WNL ARABELLA  LTG: WNL    DF AROM: 10 on (L) (was 8), 10 deg on (R) (was 5)  LT deg ARABELLA    Strength/Myotome Testing     Left Hip   Planes of Motion   Flexion: 5  Abduction: 3  External rotation: 4+  Internal rotation: 4+    Right Hip   Planes of Motion   Flexion: 4  Abduction: 4-  External rotation: 5  Internal rotation: 4+    Left Knee   Flexion: 5  Extension: 5    Right Knee   Flexion: 5  Extension: 4+    Left Ankle/Foot   Dorsiflexion: 5  Plantar flexion: 4+  Inversion: 5  Eversion: 5    Right Ankle/Foot   Dorsiflexion: 5  Plantar flexion: 4  Inversion: 5  Eversion: 5    General Comments:      Knee Comments  (R) tandem stance: 20 secs, no sway  (L) tandem stance: 20 secs, min sway  (R) SLS: 15 secs, min sway, required multiple attempts  (L) SLS: 2 secs, unsteady, required multiple attempts  Precautions: SEE DETAILED PROTOCOL IN CHART - progressions every 2 weeks, no free weights  Date of procedure: 2021  Hx T/S aortic aneurysm; Sjogren's; interstitial lung disease; knee OA; psoriatic arthritis    Date 06/17 06/21 06/24 06/28 7/1 07/06  07/12 07/15 7/19   FOTO      XX     XX   Re-eval     XX     XX       Manuals 06/17 06/21 06/24 06/28 7/1 07/06 7/8 07/12 07/15 7/19   PROM - HS, quads, piriformis, gastrocs             Joint mobs             Re-sj     AL     AL                Neuro Re-Ed 06/17 06/21 06/24 06/28 7/1 07/06 7/8 07/12 07/15 7/19   Rockerboard    25ea 30ea  30ea                                                                                     Ther Ex  06/28 7/1 07/06 7/8 07/12 07/15 7/19   TO START: Recumbent bike 8' ROM 8' full  8' L0 8' L1  10' L0 10' L0 15' L0 8' L0 8' L0 10'  L0    TM walk     5'   5'      Prone quad (S)   10"x10 20"x5ea         Prone knee flex AROM   2x10          Supine HS (S) with strap             Heelslides             LTRs             Tband ankle inv/ev GTB 20ea (B/L) GTB 30ea (B/L)           SL hip abd         10ea 2x15 ea   Reverse clams 20ea 30ea  30ea   30x ea 30ea 2#x20ea    Clams 20ea 30ea 30ea 30ea   30x ea 30ea 30ea    Open books             LAQs 2x10ea 5"x30ea 5"x30ea 5"x30ea  5"x20ea 2x15 ea  2#x20ea    Hip IR/ER EOB 2x10ea (B/L) 2x15ea (B/L)  2x15ea (B/L)  2x15ea (B/L) 2x15 ea ARABELLA 30ea (B/L) 2#x20ea (B/L)    Wedge gastroc (S) /c strap 30"x3ea /c strap 30"x3ea 20"x5 wedge          Hip flexor (S) on step             Standing HS Curl    10ea foam  30ea foam 2x15 ea  30ea foam 2#x20ea foam    Step Ups      0Rx10  1Rx20 (B/L) 1R 20x ARABELLA 0R 30ea     Mini squats       2x10 pain-free range 2x10 STS 20x hi/low STS 20x hi/low   Pt Edu       AL HEP                   Ther Activity 06/17 06/21 06/24 06/28 7/1 07/06 7/8 07/12 07/15 7/19                             Gait Training 06/17 06/21 06/24 06/28 7/1 07/06 7/8 07/12 07/15 7/19                             Modalities 06/17 06/21 06/24 06/28 7/1 07/06 7/8 07/12 07/15 7/19   Ice (NO HEAT)  10' (B/L) /c TE 10' (B/L) post 10' (B/L) post  10' (B/L) post 8' ARABELLA post 8' (B/L) post 10' (B/L) post deferred   E-STIM             Ultrasound (medial HS, pes anserine)

## 2021-07-22 ENCOUNTER — APPOINTMENT (OUTPATIENT)
Dept: PHYSICAL THERAPY | Facility: CLINIC | Age: 66
End: 2021-07-22
Payer: COMMERCIAL

## 2021-07-26 ENCOUNTER — APPOINTMENT (OUTPATIENT)
Dept: PHYSICAL THERAPY | Facility: CLINIC | Age: 66
End: 2021-07-26
Payer: COMMERCIAL

## 2021-07-29 ENCOUNTER — APPOINTMENT (OUTPATIENT)
Dept: PHYSICAL THERAPY | Facility: CLINIC | Age: 66
End: 2021-07-29
Payer: COMMERCIAL

## 2022-01-03 ENCOUNTER — RA CDI HCC (OUTPATIENT)
Dept: OTHER | Facility: HOSPITAL | Age: 67
End: 2022-01-03

## 2022-01-03 NOTE — PROGRESS NOTES
[facility]  coding opportunities          Number of diagnosis code(s) already on the problem list added to FYI fla     DX: M35 02 Sjögren syndrome with lung involvement     DX: J84 9 Interstitial pulmonary disease, unspecified     DX: L40 50 Arthropathic psoriasis, unspecified     DX: E66 01 Morbid (severe) obesity due to excess calories- BMI 36 with comorbidity of osteoarthritis        Pt canceled the appt          Patients insurance company: [employer] only)

## 2022-01-07 ENCOUNTER — TELEPHONE (OUTPATIENT)
Dept: ADMINISTRATIVE | Facility: OTHER | Age: 67
End: 2022-01-07

## 2022-01-07 NOTE — TELEPHONE ENCOUNTER
----- Message from Emma Griffiths sent at 1/7/2022 10:22 AM EST -----  01/07/22 10:22 AM    Hello, our patient Steve Reyes has had Mammogram completed/performed  Please assist in updating the patient chart by pulling the Care Everywhere (CE) document  The date of service is 11/29/21       Thank you,  YURI CABELLO  [facility]

## 2022-01-10 NOTE — TELEPHONE ENCOUNTER
Upon review of the In Basket request we were able to locate, review, and update the patient chart as requested for Mammogram     Any additional questions or concerns should be emailed to the Practice Liaisons via [email or web address] email, please do not reply via In Basket      Thank you  Rod Che

## 2022-02-03 ENCOUNTER — TELEMEDICINE (OUTPATIENT)
Dept: FAMILY MEDICINE CLINIC | Facility: CLINIC | Age: 67
End: 2022-02-03
Payer: COMMERCIAL

## 2022-02-03 DIAGNOSIS — B34.9 VIRAL INFECTION, UNSPECIFIED: Primary | ICD-10-CM

## 2022-02-03 PROCEDURE — 99213 OFFICE O/P EST LOW 20 MIN: CPT | Performed by: FAMILY MEDICINE

## 2022-02-03 PROCEDURE — 1036F TOBACCO NON-USER: CPT | Performed by: FAMILY MEDICINE

## 2022-02-03 RX ORDER — PREDNISONE 10 MG/1
TABLET ORAL
Qty: 15 TABLET | Refills: 0 | Status: SHIPPED | OUTPATIENT
Start: 2022-02-03

## 2022-02-03 NOTE — PROGRESS NOTES
COVID-19 Outpatient Progress Note    Assessment/Plan:    Problem List Items Addressed This Visit     None      Visit Diagnoses     Viral infection, unspecified    -  Primary    Relevant Medications    predniSONE 10 mg tablet    Other Relevant Orders    [facility]         Disposition:     Referred patient to centralized site to test for COVID-19  Discussed vitamin D, vitamin C, and/or zinc supplementation with patient  [address]    Your healthcare provider and/or public health staff have evaluated you and have determined that you do not need to remain in the hospital at this time   At this time you can be isolated at home where you will be monitored by staff from your local or state health department  You should carefully follow the prevention and isolation steps below until a healthcare provider or local or state health department says that you can return to your normal activities  Stay home except to get medical care    People who are mildly ill with COVID-19 are able to isolate at home during their illness  You should restrict activities outside your home, except for getting medical care  Do not go to work, school, or public areas  Avoid using public transportation, ride-sharing, or taxis  Separate yourself from other people and animals in your home    People: As much as possible, you should stay in a specific room and away from other people in your home  Also, you should use a separate bathroom, if available  Animals: You should restrict contact with pets and other animals while you are sick with COVID-19, just like you would around other people  Although there have not been reports of pets or other animals becoming sick with COVID-19, it is still recommended that people sick with COVID-19 limit contact with animals until more information is known about the virus   When possible, have another member of your household care for your animals while you are sick  If you are sick with COVID-19, avoid contact with your pet, including petting, snuggling, being kissed or licked, and sharing food  If you must care for your pet or be around animals while you are sick, wash your hands before and after you interact with pets and wear a facemask  See COVID-19 and Animals for more information  Call ahead before visiting your doctor    If you have a medical appointment, call the healthcare provider and tell them that you have or may have COVID-19  This will help the healthcare providers office take steps to keep other people from getting infected or exposed  Wear a facemask    You should wear a facemask when you are around other people (e g , sharing a room or vehicle) or pets and before you enter a healthcare providers office  If you are not able to wear a facemask (for example, because it causes trouble breathing), then people who live with you should not stay in the same room with you, or they should wear a facemask if they enter your room  Cover your coughs and sneezes    Cover your mouth and nose with a tissue when you cough or sneeze  Throw used tissues in a lined trash can  Immediately wash your hands with soap and water for at least 20 seconds or, if soap and water are not available, clean your hands with an alcohol-based hand  that contains at least 60% alcohol  Clean your hands often    Wash your hands often with soap and water for at least 20 seconds, especially after blowing your nose, coughing, or sneezing; going to the bathroom; and before eating or preparing food  If soap and water are not readily available, use an alcohol-based hand  with at least 60% alcohol, covering all surfaces of your hands and rubbing them together until they feel dry  Soap and water are the best option if hands are visibly dirty  Avoid touching your eyes, nose, and mouth with unwashed hands      Avoid sharing personal household items    You should not share dishes, drinking glasses, cups, eating utensils, towels, or bedding with other people or pets in your home  After using these items, they should be washed thoroughly with soap and water  Clean all high-touch surfaces everyday    High touch surfaces include counters, tabletops, doorknobs, bathroom fixtures, toilets, phones, keyboards, tablets, and bedside tables  Also, clean any surfaces that may have blood, stool, or body fluids on them  Use a household cleaning spray or wipe, according to the label instructions  Labels contain instructions for safe and effective use of the cleaning product including precautions you should take when applying the product, such as wearing gloves and making sure you have good ventilation during use of the product  Monitor your symptoms    Seek prompt medical attention if your illness is worsening (e g , difficulty breathing)  Before seeking care, call your healthcare provider and tell them that you have, or are being evaluated for, COVID-19  Put on a facemask before you enter the facility  These steps will help the healthcare providers office to keep other people in the office or waiting room from getting infected or exposed  Ask your healthcare provider to call the local or [facility] health department  Persons who are placed under active monitoring or facilitated self-monitoring should follow instructions provided by their local health department or occupational health professionals, as appropriate  If you have a medical emergency and need to call 911, notify the dispatch personnel that you have, or are being evaluated for COVID-19  If possible, put on a facemask before emergency medical services arrive  Discontinuing home isolation    Patients with confirmed COVID-19 should remain under home isolation precautions until the following conditions are met:    They have had no fever for at least 24 hours (that is one full day of no fever without the use medicine that reduces fevers)  AND  other symptoms have improved (for example, when their cough or shortness of breath have improved)  AND  If had mild or moderate illness, at least 10 days have passed since their symptoms first appeared or if severe illness (needed oxygen) or immunosuppressed, at least 20 days have passed since symptoms first appeared  Patients with confirmed COVID-19 should also notify close contacts (including their workplace) and ask that they self-quarantine  Currently, close contact is defined as being within 6 feet for 15 minutes or more from the period 24 hours starting 48 hours before symptom onset to the time at which the patient went into isolation   Close contacts of patients diagnosed with COVID-19 should be instructed by the patient to self-quarantine for 14 days from the last time of their last contact with the patient  Source: [employer] I have spent 10 minutes directly with the patient  Greater than 50% of this time was spent in counseling/coordination of care regarding: prognosis, risks and benefits of treatment options, instructions for management, patient and family education, importance of treatment compliance, risk factor reductions and impressions  Encounter provider Jenny Goldstein DO    Provider located at [address] [place] [address] [address]  [facility] [id number] [phone]    Recent Visits  No visits were found meeting these conditions  Showing recent visits within past 7 days and meeting all other requirements  Today's Visits  Date Type Provider Dept   02/03/22 [address], [address] [place] [address] Primary Care   Showing today's visits and meeting all other requirements  Future Appointments  No visits were found meeting these conditions    Showing future appointments within next 150 days and meeting all other requirements       Patient agrees to participate in a virtual check in via telephone or video visit instead of presenting to the office to address urgent/immediate medical needs  Patient is aware this is a billable service  After connecting through [facility], the patient was identified by name and date of birth  Josette Pratt was informed that this was a telemedicine visit and that the exam was being conducted confidentially over secure lines  My office door was closed  No one else was in the room  Josette Pratt acknowledged consent and understanding of privacy and security of the telemedicine visit  I informed the patient that I have reviewed her record in Epic and presented the opportunity for her to ask any questions regarding the visit today  The patient agreed to participate  Verification of patient location:  Patient is located in the following state in which I hold an active license: [place]    Subjective:   Josette Pratt is a 74 y o  female who is concerned about COVID-19  Patient's symptoms include nasal congestion, rhinorrhea and cough  Patient denies fever, chills, fatigue, malaise, sore throat, anosmia, loss of taste, shortness of breath, chest tightness, abdominal pain, nausea, vomiting, diarrhea, myalgias and headaches       - Date of symptom onset: 1/27/2022      COVID-19 vaccination status: Not vaccinated    Exposure:   Contact with a person who is under investigation (PUI) for or who is positive for COVID-19 within the last 14 days?: No    Hospitalized recently for fever and/or lower respiratory symptoms?: No      Currently a healthcare worker that is involved in direct patient care?: No      Resident in a special setting where the risk of COVID-19 transmission may be high? (this may include long-term care, correctional and care home facilities; homeless shelters; assisted-living facilities and group homes ): No      Lab Results   Component Value Date    SARSCOV2 Positive (A) 02/06/2021    Sam Ballard Not Detected 11/23/2020    SARSCOV2 Not Detected 10/30/2020     Past Medical History:   Diagnosis Date    Allergic     Allergic rhinitis     [id number]  RESOLVED    Candidiasis of breast     [id number] RESOLVED    Diverticulosis     Hemorrhoids     Lung nodule     Pneumonia     PONV (postoperative nausea and vomiting)     [facility]soriatic arthritis ([facility] [address] )     Sjogren's syndrome ([facility] [address] )     Followed by rheumatology - Dr Moise Duke     Past Surgical History:   Procedure Laterality Date    BREAST LUMPECTOMY Left     COLONOSCOPY      NC BRONCHOSCOPY,DIAGNOSTIC N/A 2/8/2017    Procedure: Felisa Tavarezk;  Surgeon: Sarabjit Jamison MD;  Location: [facility]; Service: Pulmonary    NC COLONOSCOPY FLX DX W/COLLJ SPEC WHEN PFRMD N/A 8/16/2016    Procedure: COLONOSCOPY;  Surgeon: Saul Cifuentes MD;  Location: [facility]; Service: General    UMBILICAL HERNIA REPAIR       Current Outpatient Medications   Medication Sig Dispense Refill    albuterol (PROAIR HFA) 90 mcg/act inhaler Inhale 2 puffs 4 (four) times a day as needed      Biotin 1 MG CAPS Take by mouth      Chromium Picolinate (CHROMIUM PICOLATE PO) Take by mouth daily   COD LIVER OIL PO Take by mouth daily   desloratadine (CLARINEX) 5 MG tablet Take 1 tablet (5 mg total) by mouth daily 30 tablet 5    folic acid (FOLVITE) 1 mg tablet Take 1 mg by mouth daily   montelukast (SINGULAIR) 10 mg tablet Take 1 tablet (10 mg total) by mouth daily at bedtime 90 tablet 0    Multiple Minerals-Vitamins (CALCIUM-MAGNESIUM-ZINC-D3) TABS Take by mouth daily   Multiple Vitamins-Minerals (MULTIVITAMIN WITH MINERALS) tablet Take 1 tablet by mouth daily        mycophenolate (CELLCEPT) 500 mg tablet TAKE THREE TABLETS BY MOUTH EVERY DAY IN THE MORNING AND TAKE TWO TABLETS BY MOUTH EVERY DAY AT BEDTIME      naproxen (NAPROSYN) 500 mg tablet Take 1 tablet (500 mg total) by mouth 2 (two) times a day with meals 60 tablet 5    olopatadine (PATANOL) 0 1 % ophthalmic solution Administer 1 drop to both eyes 2 (two) times a day 5 mL 2    omega-3-acid ethyl esters (LOVAZA) 1 g capsule Take 2 g by mouth 2 (two) times a day   predniSONE 10 mg tablet 5 x 1 day, 4 x1 day, 3 x 1 day,2 x1 day,  1 x 1 day 15 tablet 0    Specialty Vitamins Products (MAGNESIUM, AMINO ACID CHELATE,) 133 MG tablet Take 133 mg by mouth       No current facility-administered medications for this visit  Allergies   Allergen Reactions    Aspirin GI Intolerance     nausea    Sulfa Antibiotics Rash       Review of Systems   Constitutional: Negative for activity change, appetite change, chills, fatigue and fever  HENT: Positive for congestion and rhinorrhea  Negative for sore throat  Respiratory: Positive for cough  Negative for chest tightness and shortness of breath  Gastrointestinal: Negative for abdominal pain, diarrhea, nausea and vomiting  Musculoskeletal: Negative for myalgias  Neurological: Negative for headaches  Objective: There were no vitals filed for this visit  Physical Exam  Constitutional:       Appearance: She is well-developed  She is ill-appearing  HENT:      Head: Normocephalic and atraumatic  Eyes:      Conjunctiva/sclera: Conjunctivae normal    Pulmonary:      Effort: Pulmonary effort is normal    Neurological:      Mental Status: She is alert and oriented to person, place, and time  Psychiatric:         Judgment: Judgment normal          VIRTUAL VISIT DISCLAIMER    Berna Huddleston verbally agrees to participate in [employer]  Pt is aware that [employer] could be limited without vital signs or the ability to perform a full hands-on physical Boone Joshua understands she or the provider may request at any time to terminate the video visit and request the patient to seek care or treatment in person

## 2022-02-14 ENCOUNTER — APPOINTMENT (EMERGENCY)
Dept: CT IMAGING | Facility: HOSPITAL | Age: 67
End: 2022-02-14
Payer: COMMERCIAL

## 2022-02-14 ENCOUNTER — HOSPITAL ENCOUNTER (EMERGENCY)
Facility: HOSPITAL | Age: 67
Discharge: HOME | End: 2022-02-14
Attending: EMERGENCY MEDICINE
Payer: COMMERCIAL

## 2022-02-14 VITALS
BODY MASS INDEX: 35.19 KG/M2 | DIASTOLIC BLOOD PRESSURE: 65 MMHG | TEMPERATURE: 98.2 F | RESPIRATION RATE: 17 BRPM | OXYGEN SATURATION: 99 % | SYSTOLIC BLOOD PRESSURE: 147 MMHG | HEART RATE: 71 BPM | WEIGHT: 205.03 LBS

## 2022-02-14 DIAGNOSIS — R10.31 RIGHT LOWER QUADRANT ABDOMINAL PAIN: Primary | ICD-10-CM

## 2022-02-14 LAB
ALBUMIN SERPL BCP-MCNC: 3.9 G/DL (ref 3.5–5)
ALP SERPL-CCNC: 92 U/L (ref 46–116)
ALT SERPL W P-5'-P-CCNC: 27 U/L (ref 12–78)
ANION GAP SERPL CALCULATED.3IONS-SCNC: 7 MMOL/L (ref 4–13)
AST SERPL W P-5'-P-CCNC: 22 U/L (ref 5–45)
ATRIAL RATE: 57 BPM
BASOPHILS # BLD AUTO: 0 THOUSANDS/ΜL (ref 0–0.1)
BASOPHILS NFR BLD AUTO: 0 % (ref 0–1)
BILIRUB DIRECT SERPL-MCNC: 0.09 MG/DL (ref 0–0.2)
BILIRUB SERPL-MCNC: 0.34 MG/DL (ref 0.2–1)
BILIRUB UR QL STRIP: NEGATIVE
BUN SERPL-MCNC: 13 MG/DL (ref 5–25)
CALCIUM SERPL-MCNC: 8.9 MG/DL (ref 8.3–10.1)
CARDIAC TROPONIN I PNL SERPL HS: 3 NG/L
CHLORIDE SERPL-SCNC: 104 MMOL/L (ref 100–108)
CLARITY UR: CLEAR
CO2 SERPL-SCNC: 28 MMOL/L (ref 21–32)
COLOR UR: YELLOW
CREAT SERPL-MCNC: 0.61 MG/DL (ref 0.6–1.3)
EOSINOPHIL # BLD AUTO: 0.01 THOUSAND/ΜL (ref 0–0.61)
EOSINOPHIL NFR BLD AUTO: 0 % (ref 0–6)
ERYTHROCYTE [DISTWIDTH] IN BLOOD BY AUTOMATED COUNT: 14.4 % (ref 11.6–15.1)
GFR SERPL CREATININE-BSD FRML MDRD: 93 ML/MIN/1.73SQ M
GLUCOSE SERPL-MCNC: 95 MG/DL (ref 65–140)
GLUCOSE UR STRIP-MCNC: NEGATIVE MG/DL
HCT VFR BLD AUTO: 37.5 % (ref 34.8–46.1)
HGB BLD-MCNC: 11.9 G/DL (ref 11.5–15.4)
HGB UR QL STRIP.AUTO: NEGATIVE
IMM GRANULOCYTES # BLD AUTO: 0.01 THOUSAND/UL (ref 0–0.2)
IMM GRANULOCYTES NFR BLD AUTO: 0 % (ref 0–2)
KETONES UR STRIP-MCNC: NEGATIVE MG/DL
LEUKOCYTE ESTERASE UR QL STRIP: NEGATIVE
LIPASE SERPL-CCNC: 73 U/L (ref 73–393)
LYMPHOCYTES # BLD AUTO: 0.83 THOUSANDS/ΜL (ref 0.6–4.47)
LYMPHOCYTES NFR BLD AUTO: 25 % (ref 14–44)
MCH RBC QN AUTO: 30.6 PG (ref 26.8–34.3)
MCHC RBC AUTO-ENTMCNC: 31.7 G/DL (ref 31.4–37.4)
MCV RBC AUTO: 96 FL (ref 82–98)
MONOCYTES # BLD AUTO: 0.3 THOUSAND/ΜL (ref 0.17–1.22)
MONOCYTES NFR BLD AUTO: 9 % (ref 4–12)
NEUTROPHILS # BLD AUTO: 2.14 THOUSANDS/ΜL (ref 1.85–7.62)
NEUTS SEG NFR BLD AUTO: 66 % (ref 43–75)
NITRITE UR QL STRIP: NEGATIVE
NRBC BLD AUTO-RTO: 0 /100 WBCS
P AXIS: 71 DEGREES
PH UR STRIP.AUTO: 7 [PH] (ref 4.5–8)
PLATELET # BLD AUTO: 209 THOUSANDS/UL (ref 149–390)
PMV BLD AUTO: 9 FL (ref 8.9–12.7)
POTASSIUM SERPL-SCNC: 4 MMOL/L (ref 3.5–5.3)
PR INTERVAL: 178 MS
PROT SERPL-MCNC: 8.3 G/DL (ref 6.4–8.2)
PROT UR STRIP-MCNC: NEGATIVE MG/DL
QRS AXIS: 63 DEGREES
QRSD INTERVAL: 94 MS
QT INTERVAL: 424 MS
QTC INTERVAL: 412 MS
RBC # BLD AUTO: 3.89 MILLION/UL (ref 3.81–5.12)
SODIUM SERPL-SCNC: 139 MMOL/L (ref 136–145)
SP GR UR STRIP.AUTO: 1.01 (ref 1–1.03)
T WAVE AXIS: 41 DEGREES
UROBILINOGEN UR QL STRIP.AUTO: 0.2 E.U./DL
VENTRICULAR RATE: 57 BPM
WBC # BLD AUTO: 3.29 THOUSAND/UL (ref 4.31–10.16)

## 2022-02-14 PROCEDURE — 93005 ELECTROCARDIOGRAM TRACING: CPT

## 2022-02-14 PROCEDURE — 74177 CT ABD & PELVIS W/CONTRAST: CPT

## 2022-02-14 PROCEDURE — 84484 ASSAY OF TROPONIN QUANT: CPT | Performed by: EMERGENCY MEDICINE

## 2022-02-14 PROCEDURE — 85025 COMPLETE CBC W/AUTO DIFF WBC: CPT | Performed by: EMERGENCY MEDICINE

## 2022-02-14 PROCEDURE — 36415 COLL VENOUS BLD VENIPUNCTURE: CPT | Performed by: EMERGENCY MEDICINE

## 2022-02-14 PROCEDURE — 93010 ELECTROCARDIOGRAM REPORT: CPT | Performed by: INTERNAL MEDICINE

## 2022-02-14 PROCEDURE — 80076 HEPATIC FUNCTION PANEL: CPT | Performed by: EMERGENCY MEDICINE

## 2022-02-14 PROCEDURE — 83690 ASSAY OF LIPASE: CPT | Performed by: EMERGENCY MEDICINE

## 2022-02-14 PROCEDURE — 80048 BASIC METABOLIC PNL TOTAL CA: CPT | Performed by: EMERGENCY MEDICINE

## 2022-02-14 PROCEDURE — 99284 EMERGENCY DEPT VISIT MOD MDM: CPT

## 2022-02-14 PROCEDURE — G1004 CDSM NDSC: HCPCS

## 2022-02-14 PROCEDURE — 81003 URINALYSIS AUTO W/O SCOPE: CPT

## 2022-02-14 PROCEDURE — 99285 EMERGENCY DEPT VISIT HI MDM: CPT | Performed by: EMERGENCY MEDICINE

## 2022-02-14 RX ADMIN — IOHEXOL 100 ML: 350 INJECTION, SOLUTION INTRAVENOUS at 13:42

## 2022-02-14 NOTE — ED PROVIDER NOTES
History  Chief Complaint   Patient presents with    Abdominal Pain     rlq pain x5 day  C/o nausea  75 yo F presenting for evaluation of R sided abdominal pain  States pain localized to R mid/low abdomen, constant for a few days, worse after eating yesterday  States has had intermittently before, but now is constant  Tried Tylenol without relief  Nausea without vomiting  Reports episodes of chest burning yesterday, denies currently and denies history of GERD  Denies fevers, chills, CP, SOB, changes in stool, urinary complaints    75 yo F with R sided abdominal pain/nausea- declined meds at this time, abdominal labs/CT, trop/EKG          Prior to Admission Medications   Prescriptions Last Dose Informant Patient Reported? Taking? Biotin 1 MG CAPS  Self Yes No   Sig: Take by mouth   COD LIVER OIL PO  Self Yes No   Sig: Take by mouth daily  Chromium Picolinate (CHROMIUM PICOLATE PO)  Self Yes No   Sig: Take by mouth daily  Multiple Minerals-Vitamins (CALCIUM-MAGNESIUM-ZINC-D3) TABS  Self Yes No   Sig: Take by mouth daily  Multiple Vitamins-Minerals (MULTIVITAMIN WITH MINERALS) tablet  Self Yes No   Sig: Take 1 tablet by mouth daily  Specialty Vitamins Products (MAGNESIUM, AMINO ACID CHELATE,) 133 MG tablet  Self Yes No   Sig: Take 133 mg by mouth   albuterol (PROAIR HFA) 90 mcg/act inhaler  Self Yes No   Sig: Inhale 2 puffs 4 (four) times a day as needed   desloratadine (CLARINEX) 5 MG tablet  Self No No   Sig: Take 1 tablet (5 mg total) by mouth daily   folic acid (FOLVITE) 1 mg tablet  Self Yes No   Sig: Take 1 mg by mouth daily     montelukast (SINGULAIR) 10 mg tablet  Self No No   Sig: Take 1 tablet (10 mg total) by mouth daily at bedtime   mycophenolate (CELLCEPT) 500 mg tablet   Yes No   Sig: TAKE THREE TABLETS BY MOUTH EVERY DAY IN THE MORNING AND TAKE TWO TABLETS BY MOUTH EVERY DAY AT BEDTIME   naproxen (NAPROSYN) 500 mg tablet   No No   Sig: Take 1 tablet (500 mg total) by mouth 2 (two) times a day with meals   olopatadine (PATANOL) 0 1 % ophthalmic solution  Self No No   Sig: Administer 1 drop to both eyes 2 (two) times a day   omega-3-acid ethyl esters (LOVAZA) 1 g capsule  Self Yes No   Sig: Take 2 g by mouth 2 (two) times a day  predniSONE 10 mg tablet   No No   Sig: 5 x 1 day, 4 x1 day, 3 x 1 day,2 x1 day,  1 x 1 day      Facility-Administered Medications: None       Past Medical History:   Diagnosis Date    Allergic     Allergic rhinitis     [id number]  RESOLVED    Candidiasis of breast     [id number] RESOLVED    Diverticulosis     Hemorrhoids     Lung nodule     Pneumonia     PONV (postoperative nausea and vomiting)     Psoriatic arthritis (HCC)     Sjogren's syndrome (HCC)     Followed by rheumatology - Dr Shane Hogue       Past Surgical History:   Procedure Laterality Date    BREAST LUMPECTOMY Left     COLONOSCOPY      [place] BRONCHOSCOPY,DIAGNOSTIC N/A 2017    Procedure: BRONCHOSCOPY FLEXIBLE;  Surgeon: Cuca Mina MD;  Location: [facility]; Service: Pulmonary    [place] COLONOSCOPY FLX DX W/COLLJ SPEC WHEN PFRMD N/A 2016    Procedure: COLONOSCOPY;  Surgeon: Dee Hernandez MD;  Location: [facility]; Service: General    UMBILICAL HERNIA REPAIR         Family History   Problem Relation Age of Onset    Hypertension Mother     Hypothyroidism Mother     Breast cancer Sister 43    Arthritis Family      I have reviewed and agree with the history as documented      E-Cigarette/Vaping    E-Cigarette Use Never User      E-Cigarette/Vaping Substances     Social History     Tobacco Use    Smoking status: Former Smoker     Packs/day: 0 50     Years: 15 00     Pack years: 7 50     Quit date:      Years since quittin 1    Smokeless tobacco: Never Used    Tobacco comment: DENIED HISTORY OF TOBACCO USE   Vaping Use    Vaping Use: Never used   Substance Use Topics    Alcohol use: No    Drug use: No       Review of Systems   Constitutional: Negative for chills, fever and unexpected weight change  HENT: Negative for ear pain, rhinorrhea and sore throat  Eyes: Negative for pain and visual disturbance  Respiratory: Negative for cough and shortness of breath  Cardiovascular: Negative for chest pain and leg swelling  Gastrointestinal: Positive for abdominal pain and nausea  Negative for constipation, diarrhea and vomiting  Endocrine: Negative for polydipsia, polyphagia and polyuria  Genitourinary: Negative for dysuria, frequency, hematuria and urgency  Musculoskeletal: Negative for back pain, myalgias and neck pain  Skin: Negative for color change and rash  Allergic/Immunologic: Negative for environmental allergies and immunocompromised state  Neurological: Negative for dizziness, weakness, light-headedness, numbness and headaches  Hematological: Negative for adenopathy  Does not bruise/bleed easily  Psychiatric/Behavioral: Negative for agitation and confusion  All other systems reviewed and are negative  Physical Exam  Physical Exam  Vitals and nursing note reviewed  Constitutional:       General: She is not in acute distress  Appearance: She is well-developed  HENT:      Head: Normocephalic and atraumatic  Nose: Nose normal    Eyes:      Conjunctiva/sclera: Conjunctivae normal    Cardiovascular:      Rate and Rhythm: Normal rate and regular rhythm  Heart sounds: Normal heart sounds  Pulmonary:      Effort: Pulmonary effort is normal  No respiratory distress  Breath sounds: Normal breath sounds  No stridor  No wheezing or rales  Chest:      Chest wall: No tenderness  Abdominal:      General: There is no distension  Palpations: Abdomen is soft  Tenderness: There is abdominal tenderness  There is no guarding or rebound        Comments: Tender to palpation R mid/low abdomen, no rebound/guarding, no ttp R upper abdomen, negative Juan's sign, no skin changes/rash   Musculoskeletal:         General: No swelling or deformity  Cervical back: Normal range of motion and neck supple  Skin:     General: Skin is warm and dry  Findings: No rash  Neurological:      General: No focal deficit present  Mental Status: She is alert and oriented to person, place, and time  Motor: No abnormal muscle tone  Coordination: Coordination normal    Psychiatric:         Thought Content:  Thought content normal          Judgment: Judgment normal          Vital Signs  ED Triage Vitals [02/14/22 1106]   Temperature Pulse Respirations Blood Pressure SpO2   98 2 °F (36 8 °C) 70 18 151/72 99 %      Temp Source Heart Rate Source Patient Position - Orthostatic VS BP Location FiO2 (%)   Oral Monitor Sitting Right arm --      Pain Score       7           Vitals:    02/14/22 1106   BP: 151/72   Pulse: 70   Patient Position - Orthostatic VS: Sitting         Visual Acuity      ED Medications  Medications   iohexol (OMNIPAQUE) 350 MG/ML injection (SINGLE-DOSE) 100 mL (100 mL Intravenous Given 2/14/22 1342)       Diagnostic Studies  Results Reviewed     Procedure Component Value Units Date/Time    Urine Macroscopic, POC [[id number]] Collected: 02/14/22 1436    Lab Status: Final result Specimen: Urine Updated: 02/14/22 1437     Color, UA Yellow     Clarity, UA Clear     pH, UA 7 0     Leukocytes, UA Negative     Nitrite, UA Negative     Protein, UA Negative mg/dl      Glucose, UA Negative mg/dl      Ketones, UA Negative mg/dl      Urobilinogen, UA 0 2 E U /dl      Bilirubin, UA Negative     Blood, UA Negative     Specific Gravity, UA 1 010    Narrative:      CLINITEK RESULT    Basic metabolic panel [[id number]] Collected: 02/14/22 1220    Lab Status: Final result Specimen: Blood from Arm, Left Updated: 02/14/22 1310     Sodium 139 mmol/L      Potassium 4 0 mmol/L      Chloride 104 mmol/L      CO2 28 mmol/L      ANION GAP 7 mmol/L      BUN 13 mg/dL      Creatinine 0 61 mg/dL      Glucose 95 mg/dL      Calcium 8 9 mg/dL      eGFR 93 ml/min/1 73sq m     Narrative:      National Kidney Disease Foundation guidelines for Chronic Kidney Disease (CKD):     Stage 1 with normal or high GFR (GFR > 90 mL/min/1 73 square meters)    Stage 2 Mild CKD (GFR = 60-89 mL/min/1 73 square meters)    Stage 3A Moderate CKD (GFR = 45-59 mL/min/1 73 square meters)    Stage 3B Moderate CKD (GFR = 30-44 mL/min/1 73 square meters)    Stage 4 Severe CKD (GFR = 15-29 mL/min/1 73 square meters)    Stage 5 End Stage CKD (GFR <15 mL/min/1 73 square meters)  Note: GFR calculation is accurate only with a steady state creatinine    Lipase [[id number]]  (Normal) Collected: 02/14/22 1220    Lab Status: Final result Specimen: Blood from Arm, Left Updated: 02/14/22 1310     Lipase 73 u/L     Hepatic function panel [[id number]]  (Abnormal) Collected: 02/14/22 1220    Lab Status: Final result Specimen: Blood from Arm, Left Updated: 02/14/22 1310     Total Bilirubin 0 34 mg/dL      Bilirubin, Direct 0 09 mg/dL      Alkaline Phosphatase 92 U/L      AST 22 U/L      ALT 27 U/L      Total Protein 8 3 g/dL      Albumin 3 9 g/dL     HS Troponin 0hr (reflex protocol) [[id number]]  (Normal) Collected: 02/14/22 1220    Lab Status: Final result Specimen: Blood from Arm, Left Updated: 02/14/22 1304     hs TnI 0hr 3 ng/L     CBC and differential [[id number]]  (Abnormal) Collected: 02/14/22 1220    Lab Status: Final result Specimen: Blood from Arm, Left Updated: 02/14/22 1229     WBC 3 29 Thousand/uL      RBC 3 89 Million/uL      Hemoglobin 11 9 g/dL      Hematocrit 37 5 %      MCV 96 fL      MCH 30 6 pg      MCHC 31 7 g/dL      RDW 14 4 %      MPV 9 0 fL      Platelets 907 Thousands/uL      nRBC 0 /100 WBCs      Neutrophils Relative 66 %      Immat GRANS % 0 %      Lymphocytes Relative 25 %      Monocytes Relative 9 %      Eosinophils Relative 0 %      Basophils Relative 0 %      Neutrophils Absolute 2 14 Thousands/µL      Immature Grans Absolute 0 01 Thousand/uL      Lymphocytes Absolute 0 83 Thousands/µL      Monocytes Absolute 0 30 Thousand/µL      Eosinophils Absolute 0 01 Thousand/µL      Basophils Absolute 0 00 Thousands/µL     Narrative: This is an appended report  These results have been appended to a previously verified report  CT abdomen pelvis with contrast   ED Interpretation by Vanessa Colon DO (02/14 1418)   FINDINGS:     ABDOMEN     LOWER CHEST:  Mild patchy peripheral areas of scarring/fibrosis seen in the lower lobes as well as the right middle lobe and lingula      LIVER/BILIARY TREE:  Unremarkable      GALLBLADDER:  No calcified gallstones  No pericholecystic inflammatory change      SPLEEN:  Unremarkable      PANCREAS:  Unremarkable      ADRENAL GLANDS:  Unremarkable      KIDNEYS/URETERS:  Unremarkable  No hydronephrosis      STOMACH AND BOWEL:  No bowel obstruction  No focal inflammatory process      APPENDIX:  A normal appendix was visualized      ABDOMINOPELVIC CAVITY:  No significant free fluid or free air  No significant lymphadenopathy      VESSELS:  Unremarkable for patient's age      PELVIS     REPRODUCTIVE ORGANS:  2 3 cm uterine fibroid seen in the uterine fundus      URINARY BLADDER:  Unremarkable      ABDOMINAL WALL/INGUINAL REGIONS:  Unremarkable      OSSEOUS STRUCTURES:  No acute fracture or destructive osseous lesion      IMPRESSION:     No acute CT findings in the    abdomen or pelvis      No bowel obstruction or focal inflammatory process  Normal appendix      Uterine fibroid      Patchy peripheral areas of scarring/fibrosis at the visualized lung bases  Final Result by Nery Zimmer MD (02/14 1415)      No acute CT findings in the abdomen or pelvis  No bowel obstruction or focal inflammatory process  Normal appendix  Uterine fibroid  Patchy peripheral areas of scarring/fibrosis at the visualized lung bases              Workstation performed: [id number]                    Procedures  Procedures         ED Course  ED Course as of 02/14/22 1440   Mon Feb 14, 2022   1226 EKG: sinus bradycardia @ 57 bpm, normal axis, normal intervals, nonspecific st changes   1305 hs TnI 0hr: 3   1338 Pt reassessed, still c/o pain, again declines any medications  Reviewed lab results, pending CT scan   1421 Pt updated regarding CT results, unremarkable, uncertain etiology  Pain is not RUQ and normal LFTs, therefore do not feel US needed at this time  Will check urine and discharge with PCP f/u             HEART Risk Score      Most Recent Value   Heart Score Risk Calculator    History 0 Filed at: 02/14/2022 1306   ECG 0 Filed at: 02/14/2022 1306   Age 2 Filed at: 02/14/2022 1306   Risk Factors 1 Filed at: 02/14/2022 1306   Troponin 0 Filed at: 02/14/2022 1306   HEART Score 3 Filed at: 02/14/2022 1306                        SBIRT 20yo+      Most Recent Value   SBIRT (25 yo +)    In order to provide better care to our patients, we are screening all of our patients for alcohol and drug use  Would it be okay to ask you these screening questions?  No Filed at: 02/14/2022 1230                    MDM  Number of Diagnoses or Management Options  Right lower quadrant abdominal pain  Diagnosis management comments: 75 yo F with R mid/lower abdominal pain, unclear etiology, ED workup unremarkable   Discussed PCP f/u and return precautions and pt expressed understanding with plan       Amount and/or Complexity of Data Reviewed  Clinical lab tests: ordered and reviewed  Tests in the radiology section of CPT®: ordered and reviewed  Tests in the medicine section of CPT®: ordered and reviewed  Review and summarize past medical records: yes  Independent visualization of images, tracings, or specimens: yes        Disposition  Final diagnoses:   Right lower quadrant abdominal pain     Time reflects when diagnosis was documented in both MDM as applicable and the Disposition within this note     Time User Action Codes Description Comment    2/14/2022  2:22 PM Mayra Aschoff A Add [R10 31] Right lower quadrant abdominal pain       ED Disposition     ED Disposition Condition Date/Time Comment    Discharge Stable Mon Feb 14, 2022  2:22 PM Benjamen Sow discharge to home/self care  Follow-up Information     Follow up With Specialties Details Why [address] [facility]   [address] [address]  [facility]  [phone]            Patient's Medications   Discharge Prescriptions    No medications on file       No discharge procedures on file      PDMP Review     None          ED Provider  Electronically Signed by           Kalli Cruz DO  02/14/22 3951

## 2022-02-14 NOTE — DISCHARGE INSTRUCTIONS
Follow up with family doctor    Return to ER if you have any new or worsening symptoms including but not limited to intractable pain/vomiting, fevers, chest pain, etc

## 2022-03-21 ENCOUNTER — OFFICE VISIT (OUTPATIENT)
Dept: FAMILY MEDICINE CLINIC | Facility: CLINIC | Age: 67
End: 2022-03-21
Payer: COMMERCIAL

## 2022-03-21 VITALS
DIASTOLIC BLOOD PRESSURE: 80 MMHG | HEIGHT: 64 IN | SYSTOLIC BLOOD PRESSURE: 132 MMHG | WEIGHT: 209.6 LBS | BODY MASS INDEX: 35.78 KG/M2 | TEMPERATURE: 97.8 F

## 2022-03-21 DIAGNOSIS — B00.1 COLD SORE: Primary | ICD-10-CM

## 2022-03-21 PROCEDURE — 99213 OFFICE O/P EST LOW 20 MIN: CPT | Performed by: FAMILY MEDICINE

## 2022-03-21 PROCEDURE — 1160F RVW MEDS BY RX/DR IN RCRD: CPT | Performed by: FAMILY MEDICINE

## 2022-03-21 PROCEDURE — 1036F TOBACCO NON-USER: CPT | Performed by: FAMILY MEDICINE

## 2022-03-21 PROCEDURE — 3008F BODY MASS INDEX DOCD: CPT | Performed by: FAMILY MEDICINE

## 2022-03-21 RX ORDER — VALACYCLOVIR HYDROCHLORIDE 1 G/1
1000 TABLET, FILM COATED ORAL 2 TIMES DAILY
Qty: 2 TABLET | Refills: 0 | Status: SHIPPED | OUTPATIENT
Start: 2022-03-21 | End: 2022-08-09

## 2022-03-21 RX ORDER — TRIAMCINOLONE ACETONIDE 0.1 %
1 PASTE (GRAM) DENTAL 2 TIMES DAILY
Qty: 5 G | Refills: 1 | Status: SHIPPED | OUTPATIENT
Start: 2022-03-21

## 2022-03-21 NOTE — PROGRESS NOTES
Assessment/Plan:         Diagnoses and all orders for this visit:    Cold sore  Comments:  Avoid tomato, citrus, salt, and chocolate  Use medication as directed  Do not share beverages or food, no kissing  Orders:  -     valACYclovir (VALTREX) 1,000 mg tablet; Take 1 tablet (1,000 mg total) by mouth 2 (two) times a day for 1 day  -     triamcinolone (KENALOG) 0 1 % oral topical paste; Apply 1 application topically 2 (two) times a day          Subjective:   Chief Complaint   Patient presents with    Oral Swelling     lip swollen,painful x 2-3 days           Patient ID: Sammi Whittington is a 74 y o  female  She is here for cold sore in her left side of her mouth  Started 2 days ago  She denies any recent respiratory illnesses or febrile illnesses  She denies prior sores  The following portions of the patient's history were reviewed and updated as appropriate: allergies, current medications, past family history, past medical history, past social history, past surgical history and problem list     Review of Systems   Constitutional: Negative for chills and fever  HENT: Positive for mouth sores  Negative for congestion, ear pain, postnasal drip, rhinorrhea, sinus pressure, sinus pain, sore throat and trouble swallowing  Eyes: Negative for pain and visual disturbance  Respiratory: Negative for cough and shortness of breath  Cardiovascular: Negative for chest pain and palpitations  Gastrointestinal: Negative for abdominal pain and vomiting  Genitourinary: Negative for dysuria and hematuria  Musculoskeletal: Negative for arthralgias and back pain  Skin: Negative for color change and rash  Neurological: Negative for seizures and syncope  All other systems reviewed and are negative  Objective:      /80   Temp 97 8 °F (36 6 °C)   Ht 5' 4" (1 626 m)   Wt 95 1 kg (209 lb 9 6 oz)   BMI 35 98 kg/m²          Physical Exam  Vitals and nursing note reviewed     Constitutional: General: She is not in acute distress  Appearance: She is well-developed  She is obese  Comments: Uncomfortable   HENT:      Head: Normocephalic and atraumatic  Mouth/Throat:      Lips: No lesions  Mouth: Mucous membranes are moist  Oral lesions present  Tongue: No lesions  Palate: No lesions  Pharynx: Oropharynx is clear  Uvula midline  No posterior oropharyngeal erythema  Comments: 3 x 4 mm shallow ulceration on the left lower buccal mucosa  Eyes:      Conjunctiva/sclera: Conjunctivae normal    Cardiovascular:      Rate and Rhythm: Normal rate and regular rhythm  Heart sounds: Normal heart sounds  No murmur heard  Pulmonary:      Effort: Pulmonary effort is normal       Breath sounds: No decreased breath sounds, wheezing or rhonchi  Abdominal:      General: Abdomen is flat  Tenderness: There is no abdominal tenderness  Musculoskeletal:      Right lower leg: No edema  Left lower leg: No edema  Skin:     General: Skin is warm and dry  Neurological:      Mental Status: She is alert and oriented to person, place, and time     Psychiatric:         Judgment: Judgment normal

## 2022-03-29 ENCOUNTER — TELEPHONE (OUTPATIENT)
Dept: FAMILY MEDICINE CLINIC | Facility: CLINIC | Age: 67
End: 2022-03-29

## 2022-05-26 ENCOUNTER — OFFICE VISIT (OUTPATIENT)
Dept: URGENT CARE | Facility: MEDICAL CENTER | Age: 67
End: 2022-05-26
Payer: COMMERCIAL

## 2022-05-26 VITALS
OXYGEN SATURATION: 100 % | TEMPERATURE: 96.6 F | SYSTOLIC BLOOD PRESSURE: 131 MMHG | RESPIRATION RATE: 16 BRPM | HEART RATE: 79 BPM | DIASTOLIC BLOOD PRESSURE: 68 MMHG

## 2022-05-26 DIAGNOSIS — U07.1 COVID: Primary | ICD-10-CM

## 2022-05-26 PROCEDURE — G0463 HOSPITAL OUTPT CLINIC VISIT: HCPCS

## 2022-05-26 PROCEDURE — 99213 OFFICE O/P EST LOW 20 MIN: CPT

## 2022-05-26 NOTE — PROGRESS NOTES
[facility]        NAME: Imani Muller is a 74 y o  female  : 1955    MRN: [id number]  DATE: May 26, 2022  TIME: 2:26 PM    Assessment and Plan   COVID [U07 1]  1  COVID  brompheniramine-pseudoephedrine-dextromethorphan (DIMETAPP DM) 15-1-5 MG/5ML ELIX     The patient states she was diagnosed with COVID on   She states she still has cough and congestion  She has ILD and is concerned  Has been taking Mucinex  Denies fevers  Denies SOB, denies wheezing  Also has seasonal allergies which she feels may be adding to the congestion  At this time assessment notes clear lung fields, no TTP  Congestion present  Will order bromphed short term Follow-up with PCP as needed  Patient Instructions     Take Medication as directed  Follow up with PCP as needed    Chief Complaint     Chief Complaint   Patient presents with    Cough     Patient relates she took an at home rapid covid test on 2022 and tested positive  She followed cherie protocol and 1 week later took another home test and negative  Here to be seen for a cough and congestion  C/O chest tightness  Denies fever  Denies chest pain and SOB  Not vaccinated for covid-19  History of Present Illness       The patient states she was diagnosed with COVID on   She states she still has cough and congestion  She has ILD and is concerned  Has been taking Mucinex  Denies fevers  Denies SOB, denies wheezing  Also has seasonal allergies which she feels may be adding to the congestion  Review of Systems   Review of Systems   Constitutional: Negative for chills, fatigue and fever  HENT: Positive for congestion and postnasal drip  Negative for ear discharge, ear pain, sinus pressure, sinus pain and sore throat  Eyes: Negative for pain and discharge  Respiratory: Positive for cough  Negative for shortness of breath and wheezing  Cardiovascular: Negative for chest pain and palpitations     Gastrointestinal: Negative for abdominal pain, diarrhea, nausea and vomiting  Genitourinary: Negative for difficulty urinating and dysuria  Musculoskeletal: Negative for arthralgias and myalgias  Skin: Negative for rash  Neurological: Negative for dizziness, syncope, light-headedness, numbness and headaches  Psychiatric/Behavioral: Negative for agitation  All other systems reviewed and are negative  Current Medications       Current Outpatient Medications:     albuterol (PROVENTIL HFA,VENTOLIN HFA) 90 mcg/act inhaler, Inhale 2 puffs 4 (four) times a day as needed, Disp: , Rfl:     Biotin 1 MG CAPS, Take by mouth, Disp: , Rfl:     brompheniramine-pseudoephedrine-dextromethorphan (DIMETAPP DM) 15-1-5 MG/5ML ELIX, Take 10 mL by mouth every 6 (six) hours as needed for congestion or cough, Disp: 237 mL, Rfl: 0    Chromium Picolinate (CHROMIUM PICOLATE PO), Take by mouth daily  , Disp: , Rfl:     COD LIVER OIL PO, Take by mouth daily  , Disp: , Rfl:     desloratadine (CLARINEX) 5 MG tablet, Take 1 tablet (5 mg total) by mouth daily, Disp: 30 tablet, Rfl: 5    folic acid (FOLVITE) 1 mg tablet, Take 1 mg by mouth daily  , Disp: , Rfl:     montelukast (SINGULAIR) 10 mg tablet, Take 1 tablet (10 mg total) by mouth daily at bedtime, Disp: 90 tablet, Rfl: 0    Multiple Minerals-Vitamins (CALCIUM-MAGNESIUM-ZINC-D3) TABS, Take by mouth daily  , Disp: , Rfl:     Multiple Vitamins-Minerals (MULTIVITAMIN WITH MINERALS) tablet, Take 1 tablet by mouth daily  , Disp: , Rfl:     mycophenolate (CELLCEPT) 500 mg tablet, TAKE THREE TABLETS BY MOUTH EVERY DAY IN THE MORNING AND TAKE TWO TABLETS BY MOUTH EVERY DAY AT BEDTIME, Disp: , Rfl:     naproxen (NAPROSYN) 500 mg tablet, Take 1 tablet (500 mg total) by mouth 2 (two) times a day with meals, Disp: 60 tablet, Rfl: 5    olopatadine (PATANOL) 0 1 % ophthalmic solution, Administer 1 drop to both eyes 2 (two) times a day, Disp: 5 mL, Rfl: 2    omega-3-acid ethyl esters (LOVAZA) 1 g capsule, Take 2 g by mouth 2 (two) times a day , Disp: , Rfl:     Specialty Vitamins Products (MAGNESIUM, AMINO ACID CHELATE,) 133 MG tablet, Take 133 mg by mouth, Disp: , Rfl:     triamcinolone (KENALOG) 0 1 % oral topical paste, Apply 1 application topically 2 (two) times a day, Disp: 5 g, Rfl: 1    predniSONE 10 mg tablet, 5 x 1 day, 4 x1 day, 3 x 1 day,2 x1 day,  1 x 1 day (Patient not taking: No sig reported), Disp: 15 tablet, Rfl: 0    valACYclovir (VALTREX) 1,000 mg tablet, Take 1 tablet (1,000 mg total) by mouth 2 (two) times a day for 1 day, Disp: 2 tablet, Rfl: 0    Current Allergies     Allergies as of 05/26/2022 - Reviewed 05/26/2022   Allergen Reaction Noted    Egg yolk - food allergy Diarrhea 05/26/2022    Aspirin GI Intolerance 08/16/2016    Sulfa antibiotics Rash 08/16/2016            The following portions of the patient's history were reviewed and updated as appropriate: allergies, current medications, past family history, past medical history, past social history, past surgical history and problem list      Past Medical History:   Diagnosis Date    Allergic     Allergic rhinitis     [id number]  RESOLVED    Candidiasis of breast     [id number] RESOLVED    Diverticulosis     Hemorrhoids     Lung nodule     Pneumonia     PONV (postoperative nausea and vomiting)     Psoriatic arthritis ([address] )     Sjogren's syndrome ([facility] [address] )     Followed by rheumatology - Dr Ping Jacobsen       Past Surgical History:   Procedure Laterality Date    BREAST LUMPECTOMY Left     COLONOSCOPY      UT BRONCHOSCOPY,DIAGNOSTIC N/A 2/8/2017    Procedure: Bessie Schwab;  Surgeon: Janie Sanchez MD;  Location: [facility]; Service: Pulmonary    UT COLONOSCOPY FLX DX W/COLLJ SPEC WHEN PFRMD N/A 8/16/2016    Procedure: COLONOSCOPY;  Surgeon: Kofi Max MD;  Location: [facility];   Service: General    UMBILICAL HERNIA REPAIR         Family History   Problem Relation Age of Onset    Hypertension Mother     Hypothyroidism Mother     Breast cancer Sister 43    Arthritis Family          Medications have been verified  Objective   /68   Pulse 79   Temp (!) 96 6 °F (35 9 °C) (Tympanic)   Resp 16   SpO2 100%   No LMP recorded  Patient is postmenopausal        Physical Exam     Physical Exam  Vitals reviewed  Constitutional:       General: She is not in acute distress  Appearance: Normal appearance  She is not ill-appearing  HENT:      Head: Normocephalic and atraumatic  Right Ear: Tympanic membrane and ear canal normal  No middle ear effusion  Left Ear: Tympanic membrane and ear canal normal   No middle ear effusion  Nose: Congestion and rhinorrhea present  Mouth/Throat:      Mouth: Mucous membranes are moist       Pharynx: No oropharyngeal exudate or posterior oropharyngeal erythema  Tonsils: No tonsillar exudate  Eyes:      Extraocular Movements: Extraocular movements intact  Conjunctiva/sclera: Conjunctivae normal       Pupils: Pupils are equal, round, and reactive to light  Cardiovascular:      Rate and Rhythm: Normal rate and regular rhythm  Pulses: Normal pulses  Heart sounds: Normal heart sounds  No murmur heard  Pulmonary:      Effort: Pulmonary effort is normal  No respiratory distress  Breath sounds: Normal breath sounds  No wheezing or rhonchi  Chest:      Chest wall: No tenderness  Abdominal:      General: Bowel sounds are normal  There is no distension  Palpations: Abdomen is soft  Tenderness: There is no abdominal tenderness  There is no guarding  Musculoskeletal:      Cervical back: Normal range of motion and neck supple  No tenderness  Lymphadenopathy:      Cervical: No cervical adenopathy  Skin:     General: Skin is warm  Neurological:      General: No focal deficit present  Mental Status: She is alert     Psychiatric:         Mood and Affect: Mood normal          Behavior: Behavior normal          Judgment: Judgment normal

## 2022-07-05 NOTE — ASSESSMENT & PLAN NOTE
Follow-up with Rheumatology scheduled for February  If no improvement, may need to be seen sooner 
Lungs sound clear, pulse ox 99% despite her complaints of dyspnea 
Trial of prednisone, check an x-ray 
complains of pain/discomfort

## 2022-08-09 ENCOUNTER — HOSPITAL ENCOUNTER (EMERGENCY)
Facility: HOSPITAL | Age: 67
Discharge: HOME | End: 2022-08-09
Attending: EMERGENCY MEDICINE
Payer: COMMERCIAL

## 2022-08-09 ENCOUNTER — APPOINTMENT (EMERGENCY)
Dept: RADIOLOGY | Facility: HOSPITAL | Age: 67
End: 2022-08-09
Payer: COMMERCIAL

## 2022-08-09 ENCOUNTER — APPOINTMENT (EMERGENCY)
Dept: CT IMAGING | Facility: HOSPITAL | Age: 67
End: 2022-08-09
Payer: COMMERCIAL

## 2022-08-09 VITALS
WEIGHT: 208.34 LBS | BODY MASS INDEX: 35.76 KG/M2 | DIASTOLIC BLOOD PRESSURE: 61 MMHG | SYSTOLIC BLOOD PRESSURE: 114 MMHG | RESPIRATION RATE: 16 BRPM | TEMPERATURE: 98 F | HEART RATE: 80 BPM | OXYGEN SATURATION: 99 %

## 2022-08-09 DIAGNOSIS — R07.9 CHEST PAIN: Primary | ICD-10-CM

## 2022-08-09 DIAGNOSIS — Z87.09 H/O INTERSTITIAL LUNG DISEASE: ICD-10-CM

## 2022-08-09 LAB
2HR DELTA HS TROPONIN: 2 NG/L
ALBUMIN SERPL BCP-MCNC: 3.8 G/DL (ref 3.5–5)
ALP SERPL-CCNC: 135 U/L (ref 46–116)
ALT SERPL W P-5'-P-CCNC: 23 U/L (ref 12–78)
ANION GAP SERPL CALCULATED.3IONS-SCNC: 9 MMOL/L (ref 4–13)
AST SERPL W P-5'-P-CCNC: 24 U/L (ref 5–45)
ATRIAL RATE: 79 BPM
ATRIAL RATE: 83 BPM
BASOPHILS # BLD AUTO: 0.01 THOUSANDS/ΜL (ref 0–0.1)
BASOPHILS NFR BLD AUTO: 0 % (ref 0–1)
BILIRUB SERPL-MCNC: 0.3 MG/DL (ref 0.2–1)
BUN SERPL-MCNC: 13 MG/DL (ref 5–25)
CALCIUM SERPL-MCNC: 9.4 MG/DL (ref 8.3–10.1)
CARDIAC TROPONIN I PNL SERPL HS: 7 NG/L
CARDIAC TROPONIN I PNL SERPL HS: 9 NG/L
CHLORIDE SERPL-SCNC: 102 MMOL/L (ref 96–108)
CO2 SERPL-SCNC: 29 MMOL/L (ref 21–32)
CREAT SERPL-MCNC: 0.71 MG/DL (ref 0.6–1.3)
D DIMER PPP FEU-MCNC: 0.72 UG/ML FEU
EOSINOPHIL # BLD AUTO: 0.03 THOUSAND/ΜL (ref 0–0.61)
EOSINOPHIL NFR BLD AUTO: 1 % (ref 0–6)
ERYTHROCYTE [DISTWIDTH] IN BLOOD BY AUTOMATED COUNT: 14.5 % (ref 11.6–15.1)
GFR SERPL CREATININE-BSD FRML MDRD: 87 ML/MIN/1.73SQ M
GLUCOSE SERPL-MCNC: 108 MG/DL (ref 65–140)
HCT VFR BLD AUTO: 35.5 % (ref 34.8–46.1)
HGB BLD-MCNC: 11.5 G/DL (ref 11.5–15.4)
IMM GRANULOCYTES # BLD AUTO: 0.02 THOUSAND/UL (ref 0–0.2)
IMM GRANULOCYTES NFR BLD AUTO: 0 % (ref 0–2)
LIPASE SERPL-CCNC: 115 U/L (ref 73–393)
LYMPHOCYTES # BLD AUTO: 0.78 THOUSANDS/ΜL (ref 0.6–4.47)
LYMPHOCYTES NFR BLD AUTO: 13 % (ref 14–44)
MCH RBC QN AUTO: 30.7 PG (ref 26.8–34.3)
MCHC RBC AUTO-ENTMCNC: 32.4 G/DL (ref 31.4–37.4)
MCV RBC AUTO: 95 FL (ref 82–98)
MONOCYTES # BLD AUTO: 0.47 THOUSAND/ΜL (ref 0.17–1.22)
MONOCYTES NFR BLD AUTO: 8 % (ref 4–12)
NEUTROPHILS # BLD AUTO: 4.64 THOUSANDS/ΜL (ref 1.85–7.62)
NEUTS SEG NFR BLD AUTO: 78 % (ref 43–75)
NRBC BLD AUTO-RTO: 0 /100 WBCS
P AXIS: 57 DEGREES
P AXIS: 62 DEGREES
PLATELET # BLD AUTO: 227 THOUSANDS/UL (ref 149–390)
PMV BLD AUTO: 8.8 FL (ref 8.9–12.7)
POTASSIUM SERPL-SCNC: 3.8 MMOL/L (ref 3.5–5.3)
PR INTERVAL: 172 MS
PR INTERVAL: 174 MS
PROT SERPL-MCNC: 8.6 G/DL (ref 6.4–8.4)
QRS AXIS: 55 DEGREES
QRS AXIS: 70 DEGREES
QRSD INTERVAL: 92 MS
QRSD INTERVAL: 94 MS
QT INTERVAL: 364 MS
QT INTERVAL: 366 MS
QTC INTERVAL: 417 MS
QTC INTERVAL: 430 MS
RBC # BLD AUTO: 3.74 MILLION/UL (ref 3.81–5.12)
SODIUM SERPL-SCNC: 140 MMOL/L (ref 135–147)
T WAVE AXIS: 35 DEGREES
T WAVE AXIS: 45 DEGREES
VENTRICULAR RATE: 79 BPM
VENTRICULAR RATE: 83 BPM
WBC # BLD AUTO: 5.95 THOUSAND/UL (ref 4.31–10.16)

## 2022-08-09 PROCEDURE — 83690 ASSAY OF LIPASE: CPT | Performed by: PHYSICIAN ASSISTANT

## 2022-08-09 PROCEDURE — G1004 CDSM NDSC: HCPCS

## 2022-08-09 PROCEDURE — 99285 EMERGENCY DEPT VISIT HI MDM: CPT | Performed by: PHYSICIAN ASSISTANT

## 2022-08-09 PROCEDURE — 85025 COMPLETE CBC W/AUTO DIFF WBC: CPT | Performed by: PHYSICIAN ASSISTANT

## 2022-08-09 PROCEDURE — 71275 CT ANGIOGRAPHY CHEST: CPT

## 2022-08-09 PROCEDURE — 36415 COLL VENOUS BLD VENIPUNCTURE: CPT | Performed by: PHYSICIAN ASSISTANT

## 2022-08-09 PROCEDURE — 80053 COMPREHEN METABOLIC PANEL: CPT | Performed by: PHYSICIAN ASSISTANT

## 2022-08-09 PROCEDURE — 85379 FIBRIN DEGRADATION QUANT: CPT | Performed by: PHYSICIAN ASSISTANT

## 2022-08-09 PROCEDURE — 71046 X-RAY EXAM CHEST 2 VIEWS: CPT

## 2022-08-09 PROCEDURE — 84484 ASSAY OF TROPONIN QUANT: CPT | Performed by: PHYSICIAN ASSISTANT

## 2022-08-09 PROCEDURE — 93010 ELECTROCARDIOGRAM REPORT: CPT | Performed by: INTERNAL MEDICINE

## 2022-08-09 PROCEDURE — 93005 ELECTROCARDIOGRAM TRACING: CPT

## 2022-08-09 PROCEDURE — 99285 EMERGENCY DEPT VISIT HI MDM: CPT

## 2022-08-09 RX ORDER — ACETAMINOPHEN 325 MG/1
650 TABLET ORAL ONCE
Status: COMPLETED | OUTPATIENT
Start: 2022-08-09 | End: 2022-08-09

## 2022-08-09 RX ORDER — MAGNESIUM HYDROXIDE/ALUMINUM HYDROXICE/SIMETHICONE 120; 1200; 1200 MG/30ML; MG/30ML; MG/30ML
30 SUSPENSION ORAL ONCE
Status: COMPLETED | OUTPATIENT
Start: 2022-08-09 | End: 2022-08-09

## 2022-08-09 RX ORDER — LIDOCAINE HYDROCHLORIDE 20 MG/ML
15 SOLUTION OROPHARYNGEAL ONCE
Status: COMPLETED | OUTPATIENT
Start: 2022-08-09 | End: 2022-08-09

## 2022-08-09 RX ADMIN — ACETAMINOPHEN 325MG 650 MG: 325 TABLET ORAL at 02:19

## 2022-08-09 RX ADMIN — LIDOCAINE HYDROCHLORIDE 15 ML: 20 SOLUTION ORAL; TOPICAL at 02:20

## 2022-08-09 RX ADMIN — ALUMINUM HYDROXIDE, MAGNESIUM HYDROXIDE, AND SIMETHICONE 30 ML: 200; 200; 20 SUSPENSION ORAL at 02:20

## 2022-08-09 RX ADMIN — IOHEXOL 100 ML: 350 INJECTION, SOLUTION INTRAVENOUS at 04:44

## 2022-08-09 NOTE — ED PROVIDER NOTES
History  Chief Complaint   Patient presents with    Chest Pain     PT c/o left sided chest pain that comes and goes started approx at 10pm, reports pain radiates to left shoulder and jaw  Pt also reports nausea     Chey Green is a 74 y o   female with PMH of Sjogren's and ILD who presents to the emergency department with chest pain  The patient states to 10:00 p m  She had gradually increasing chest pain  States that gradually got worse and is now getting better  Describes the pain as a burning pain in the center of her chest without radiation  Denies any syncope, diaphoresis, dizziness, lightheadedness, palpitations, or shortness of breath  Pain does not radiate to her back  Nothing she did seem to make it better or worse  Not worse on exertion  Range of motion of the shoulders were movement does not make the pain worse  Symptoms are improving   No associated fevers, chills, cough, congestion, abdominal pain, nausea, vomiting, diarrhea  The patient denies any hemoptysis, palpitations, estrogen use, smoking, recent long-term travel, sedentary lifestyle, active cancer, family history of blood clots, personal history of blood clots, clotting disorder, or leg pain      Patient denies history of hypertension, hyperlipidemia, diabetes or previous heart attack/ CAD              History provided by:  Patient   used: No    Chest Pain  Pain location:  Substernal area  Pain quality: burning    Pain radiates to:  Does not radiate  Pain radiates to the back: no    Pain severity:  Moderate  Onset quality:  Gradual  Duration:  4 hours  Timing:  Constant  Progression:  Unchanged  Chronicity:  New  Context: not breathing, no drug use, not eating and no intercourse    Relieved by:  None tried  Worsened by:  Nothing tried  Ineffective treatments:  None tried  Associated symptoms: no abdominal pain, no altered mental status, no anorexia, no anxiety, no back pain, no claudication, no cough, no diaphoresis, no dizziness, no dysphagia, no fatigue, no fever, no headache, no heartburn, no lower extremity edema, no nausea, no near-syncope, no numbness, no orthopnea, no palpitations, no shortness of breath, no syncope, not vomiting and no weakness    Risk factors: no aortic disease, no birth control, no coronary artery disease, no diabetes mellitus, no Eduardo-Danlos syndrome, not male, no Marfan's syndrome, not obese, not pregnant, no prior DVT/PE and no smoking        Prior to Admission Medications   Prescriptions Last Dose Informant Patient Reported? Taking? Biotin 1 MG CAPS Not Taking at Unknown time Self Yes No   Sig: Take by mouth   Patient not taking: Reported on 8/9/2022   COD LIVER OIL PO  Self Yes Yes   Sig: Take by mouth daily  Chromium Picolinate (CHROMIUM PICOLATE PO) Not Taking at Unknown time Self Yes No   Sig: Take by mouth daily  Patient not taking: Reported on 8/9/2022   Multiple Minerals-Vitamins (CALCIUM-MAGNESIUM-ZINC-D3) TABS  Self Yes Yes   Sig: Take by mouth daily  Multiple Vitamins-Minerals (MULTIVITAMIN WITH MINERALS) tablet  Self Yes Yes   Sig: Take 1 tablet by mouth daily  Specialty Vitamins Products (MAGNESIUM, AMINO ACID CHELATE,) 133 MG tablet  Self Yes Yes   Sig: Take 133 mg by mouth   albuterol (PROVENTIL HFA,VENTOLIN HFA) 90 mcg/act inhaler Not Taking at Unknown time Self Yes No   Sig: Inhale 2 puffs 4 (four) times a day as needed   Patient not taking: Reported on 8/9/2022   brompheniramine-pseudoephedrine-dextromethorphan (Haley Foots DM) 15-1-5 MG/5ML ELIX Not Taking at Unknown time  No No   Sig: Take 10 mL by mouth every 6 (six) hours as needed for congestion or cough   Patient not taking: Reported on 8/9/2022   desloratadine (CLARINEX) 5 MG tablet  Self No Yes   Sig: Take 1 tablet (5 mg total) by mouth daily   folic acid (FOLVITE) 1 mg tablet  Self Yes Yes   Sig: Take 1 mg by mouth daily     montelukast (SINGULAIR) 10 mg tablet  Self No Yes   Sig: Take 1 tablet (10 mg total) by mouth daily at bedtime   mycophenolate (CELLCEPT) 500 mg tablet   Yes Yes   Sig: TAKE THREE TABLETS BY MOUTH EVERY DAY IN THE MORNING AND TAKE TWO TABLETS BY MOUTH EVERY DAY AT BEDTIME   naproxen (NAPROSYN) 500 mg tablet   No Yes   Sig: Take 1 tablet (500 mg total) by mouth 2 (two) times a day with meals   olopatadine (PATANOL) 0 1 % ophthalmic solution  Self No Yes   Sig: Administer 1 drop to both eyes 2 (two) times a day   omega-3-acid ethyl esters (LOVAZA) 1 g capsule Not Taking at Unknown time Self Yes No   Sig: Take 2 g by mouth 2 (two) times a day  Patient not taking: Reported on 8/9/2022   predniSONE 10 mg tablet   No No   Sig: 5 x 1 day, 4 x1 day, 3 x 1 day,2 x1 day,  1 x 1 day   Patient not taking: No sig reported   triamcinolone (KENALOG) 0 1 % oral topical paste   No Yes   Sig: Apply 1 application topically 2 (two) times a day      Facility-Administered Medications: None       Past Medical History:   Diagnosis Date    Allergic     Allergic rhinitis     [id number]  RESOLVED    Candidiasis of breast     [id number] RESOLVED    Diverticulosis     Hemorrhoids     Lung nodule     Pneumonia     PONV (postoperative nausea and vomiting)     Psoriatic arthritis (HCC)     Sjogren's syndrome (HCC)     Followed by rheumatology - Dr Van Thomson       Past Surgical History:   Procedure Laterality Date    BREAST LUMPECTOMY Left     COLONOSCOPY      PA [address] N/A 2/8/2017    Procedure: General [place];  Surgeon: Walker Cleaning MD;  Location: [facility]; Service: Pulmonary    PA COLONOSCOPY FLX DX W/COLLJ SPEC WHEN PFRMD N/A 8/16/2016    Procedure: COLONOSCOPY;  Surgeon: Alcides Mcmullen MD;  Location: [facility];   Service: General    UMBILICAL HERNIA REPAIR         Family History   Problem Relation Age of Onset    Hypertension Mother     Hypothyroidism Mother     Breast cancer Sister 43    Arthritis Family      I have reviewed and agree with the history as documented  E-Cigarette/Vaping    E-Cigarette Use Never User      E-Cigarette/Vaping Substances     Social History     Tobacco Use    Smoking status: Former Smoker     Packs/day: 0 50     Years: 15 00     Pack years: 7 50     Quit date:      Years since quittin 6    Smokeless tobacco: Never Used    Tobacco comment: DENIED HISTORY OF TOBACCO USE   Vaping Use    Vaping Use: Never used   Substance Use Topics    Alcohol use: No    Drug use: No       Review of Systems   Constitutional: Negative for activity change, appetite change, chills, diaphoresis, fatigue, fever and unexpected weight change  HENT: Negative for congestion, dental problem, ear pain, mouth sores, nosebleeds, sinus pressure, sinus pain, sneezing, sore throat and trouble swallowing  Respiratory: Negative for apnea, cough, choking, chest tightness, shortness of breath, wheezing and stridor  Cardiovascular: Positive for chest pain  Negative for palpitations, orthopnea, claudication, leg swelling, syncope and near-syncope  Gastrointestinal: Negative for abdominal pain, anorexia, constipation, diarrhea, heartburn, nausea and vomiting  Genitourinary: Negative for dysuria, flank pain, frequency and urgency  Musculoskeletal: Negative for arthralgias, back pain, joint swelling and myalgias  Skin: Negative for color change, pallor and rash  Neurological: Negative for dizziness, tremors, seizures, syncope, speech difficulty, weakness, light-headedness, numbness and headaches  All other systems reviewed and are negative  Physical Exam  Physical Exam  Vitals and nursing note reviewed  Constitutional:       General: She is not in acute distress  Appearance: Normal appearance  She is normal weight  She is not ill-appearing or toxic-appearing  HENT:      Head: Normocephalic and atraumatic  Mouth/Throat:      Mouth: Mucous membranes are moist       Pharynx: Oropharynx is clear     Eyes:      Extraocular Movements: Extraocular movements intact  Pupils: Pupils are equal, round, and reactive to light  Cardiovascular:      Rate and Rhythm: Normal rate and regular rhythm  Pulses: Normal pulses  Heart sounds: Normal heart sounds  Heart sounds not distant  No murmur heard  No systolic murmur is present  No diastolic murmur is present  No friction rub  No gallop  Pulmonary:      Effort: Pulmonary effort is normal  No respiratory distress  Breath sounds: Normal breath sounds  No stridor  No wheezing, rhonchi or rales  Chest:      Chest wall: No mass, deformity, tenderness, crepitus or edema  There is no dullness to percussion  Abdominal:      General: Abdomen is flat  Bowel sounds are normal  There is no distension  Palpations: Abdomen is soft  There is no mass  Tenderness: There is no abdominal tenderness  There is no guarding or rebound  Hernia: No hernia is present  Musculoskeletal:         General: No swelling, tenderness, deformity or signs of injury  Normal range of motion  Cervical back: Normal range of motion  No rigidity or tenderness  Right lower leg: No edema  Left lower leg: No edema  Skin:     General: Skin is warm and dry  Capillary Refill: Capillary refill takes less than 2 seconds  Neurological:      General: No focal deficit present  Mental Status: She is alert and oriented to person, place, and time  Mental status is at baseline  Cranial Nerves: No cranial nerve deficit  Sensory: No sensory deficit  Motor: No weakness        Coordination: Coordination normal          Vital Signs  ED Triage Vitals   Temperature Pulse Respirations Blood Pressure SpO2   08/09/22 0035 08/09/22 0036 08/09/22 0036 08/09/22 0036 08/09/22 0036   98 °F (36 7 °C) 90 16 144/71 99 %      Temp Source Heart Rate Source Patient Position - Orthostatic VS BP Location FiO2 (%)   08/09/22 0035 08/09/22 0036 08/09/22 0036 08/09/22 0036 --   Oral Monitor Sitting Right arm       Pain Score       08/09/22 0036       7           Vitals:    08/09/22 0036 08/09/22 0313   BP: 144/71 119/64   Pulse: 90 83   Patient Position - Orthostatic VS: Sitting Lying         Visual Acuity      ED Medications  Medications   acetaminophen (TYLENOL) tablet 650 mg (650 mg Oral Given 8/9/22 0219)   aluminum-magnesium hydroxide-simethicone (MYLANTA) oral suspension 30 mL (30 mL Oral Given 8/9/22 0220)   Lidocaine Viscous HCl (XYLOCAINE) 2 % mucosal solution 15 mL (15 mL Swish & Spit Given 8/9/22 0220)   iohexol (OMNIPAQUE) 350 MG/ML injection (SINGLE-DOSE) 100 mL (100 mL Intravenous Given 8/9/22 0444)       Diagnostic Studies  Results Reviewed     Procedure Component Value Units Date/Time    HS Troponin I 2hr [[id number]]  (Normal) Collected: 08/09/22 0414    Lab Status: Final result Specimen: Blood from Arm, Left Updated: 08/09/22 0442     hs TnI 2hr 9 ng/L      Delta 2hr hsTnI 2 ng/L     D-Dimer [[id number]]  (Abnormal) Collected: 08/09/22 0221    Lab Status: Final result Specimen: Blood from Arm, Left Updated: 08/09/22 0326     D-Dimer, Quant 0 72 ug/ml FEU     Narrative: In the evaluation for possible pulmonary embolism, in the appropriate (Well's Score of 4 or less) patient, the age adjusted d-dimer cutoff for this patient can be calculated as:    Age x 0 01 (in ug/mL) for Age-adjusted D-dimer exclusion threshold for a patient over 50 years      HS Troponin 0hr (reflex protocol) [[id number]]  (Normal) Collected: 08/09/22 0221    Lab Status: Final result Specimen: Blood from Arm, Left Updated: 08/09/22 0258     hs TnI 0hr 7 ng/L     Comprehensive metabolic panel [[id number]]  (Abnormal) Collected: 08/09/22 0221    Lab Status: Final result Specimen: Blood from Arm, Left Updated: 08/09/22 0251     Sodium 140 mmol/L      Potassium 3 8 mmol/L      Chloride 102 mmol/L      CO2 29 mmol/L      ANION GAP 9 mmol/L      BUN 13 mg/dL      Creatinine 0 71 mg/dL      Glucose 108 mg/dL      Calcium 9 4 mg/dL      AST 24 U/L      ALT 23 U/L      Alkaline Phosphatase 135 U/L      Total Protein 8 6 g/dL      Albumin 3 8 g/dL      Total Bilirubin 0 30 mg/dL      eGFR 87 ml/min/1 73sq m     Narrative:      [place] guidelines for Chronic Kidney Disease (CKD):     Stage 1 with normal or high GFR (GFR > 90 mL/min/1 73 square meters)    Stage 2 Mild CKD (GFR = 60-89 mL/min/1 73 square meters)    Stage 3A Moderate CKD (GFR = 45-59 mL/min/1 73 square meters)    Stage 3B Moderate CKD (GFR = 30-44 mL/min/1 73 square meters)    Stage 4 Severe CKD (GFR = 15-29 mL/min/1 73 square meters)    Stage 5 End Stage CKD (GFR <15 mL/min/1 73 square meters)  Note: GFR calculation is accurate only with a steady state creatinine    Lipase [[id number]]  (Normal) Collected: 08/09/22 0221    Lab Status: Final result Specimen: Blood from Arm, Left Updated: 08/09/22 0251     Lipase 115 u/L     CBC and differential [[id number]]  (Abnormal) Collected: 08/09/22 0221    Lab Status: Final result Specimen: Blood from Arm, Left Updated: 08/09/22 0233     WBC 5 95 Thousand/uL      RBC 3 74 Million/uL      Hemoglobin 11 5 g/dL      Hematocrit 35 5 %      MCV 95 fL      MCH 30 7 pg      MCHC 32 4 g/dL      RDW 14 5 %      MPV 8 8 fL      Platelets 557 Thousands/uL      nRBC 0 /100 WBCs      Neutrophils Relative 78 %      Immat GRANS % 0 %      Lymphocytes Relative 13 %      Monocytes Relative 8 %      Eosinophils Relative 1 %      Basophils Relative 0 %      Neutrophils Absolute 4 64 Thousands/µL      Immature Grans Absolute 0 02 Thousand/uL      Lymphocytes Absolute 0 78 Thousands/µL      Monocytes Absolute 0 47 Thousand/µL      Eosinophils Absolute 0 03 Thousand/µL      Basophils Absolute 0 01 Thousands/µL                  CTA ED chest PE Study   Final Result by Digna Arana DO (08/09 3741)      No pulmonary embolus or other acute abnormality        Multiple areas of fibrotic change/scarring, suggesting fibrotic interstitial lung disease  Findings have slightly progressed since 2017  Workstation performed: [id number]         XR chest 2 views   ED Interpretation by Jaycob Marinelli PA-C (08/09 0241)   No infiltrate, cardiac silhouette normal, no pleural effusions or pulmonary edema                      Procedures  ECG 12 Lead Documentation Only    Date/Time: 8/9/2022 2:26 AM  Performed by: Jaycob Marinelli PA-C  Authorized by: Jaycob Marinelli PA-C     Indications / Diagnosis:  Chest Pain   ECG reviewed by me, the ED Provider: yes    Patient location:  ED  Previous ECG:     Previous ECG:  Unavailable  Interpretation:     Interpretation: normal    Rate:     ECG rate:  83    ECG rate assessment: normal    Rhythm:     Rhythm: sinus rhythm    Ectopy:     Ectopy: none    QRS:     QRS axis:  Normal    QRS intervals:  Normal  Conduction:     Conduction: normal    ST segments:     ST segments:  Normal  T waves:     T waves: inverted      Inverted:  V1    ECG 12 Lead Documentation Only    Date/Time: 8/9/2022 4:18 AM  Performed by: Jaycob Marinelli PA-C  Authorized by: Jaycob Marinelli PA-C     Indications / Diagnosis:  Delta  Patient location:  ED  Previous ECG:     Previous ECG:  Compared to current    Similarity:  No change  Interpretation:     Interpretation: normal    Rate:     ECG rate:  79    ECG rate assessment: normal    Rhythm:     Rhythm: sinus rhythm    Ectopy:     Ectopy: none    QRS:     QRS axis:  Normal    QRS intervals:  Normal  Conduction:     Conduction: normal    ST segments:     ST segments:  Normal  T waves:     T waves: normal               ED Course             HEART Risk Score    Flowsheet Row Most Recent Value   Heart Score Risk Calculator    History 1 Filed at: 08/09/2022 0419   ECG 0 Filed at: 08/09/2022 0419   Age 2 Filed at: 08/09/2022 0419   Risk Factors 1 Filed at: 08/09/2022 0419   Troponin 0 Filed at: 08/09/2022 0419   HEART Score 4 Filed at: 08/09/2022 4558 SBIRT 20yo+    Flowsheet Row Most Recent Value   SBIRT (25 yo +)    In order to provide better care to our patients, we are screening all of our patients for alcohol and drug use  Would it be okay to ask you these screening questions? No Filed at: 08/09/2022 0211                    MDM  Number of Diagnoses or Management Options  Chest pain: new and requires workup  H/O interstitial lung disease: established and worsening  Diagnosis management comments: Patient was seen and examined  in the emergency department for chief complaint of chest pain  The patient presented for his chest pain  Retrosternal burning pain without radiation  No other associated symptoms  Nothing she does seems to make it better or worse, spontaneously improving  On exam patient is in no acute distress, lungs are clear, abdomen soft nontender nondistended  No lower extremity edema  No PE risk factors         DDX including but not limited to: ACS, MI, PE, PTX, pneumonia, pleurisy, pericarditis, myocarditis, rhabdomyolysis, doubt dissection, GI etiology  Workup: Will check basic labs, EKG, chest x-ray  Symptomatic control with Tylenol and GI cocktail  Reassessment  Lab work reassuring  Elevated D-dimer will require CT PE study  Awaiting delta troponin as well  CT PE study negative for pulmonary embolism, progressive interstitial lung disease, patient updated on this finding  All symptoms resolved at this time  Will discharge  Heart score 4  Follow-up cardiology  Disposition:  General impression 58-year-old female chest pain  Heart score 4  Follow-up cardiology  Strict return precautions discussed  Follow-up discussed  The patient was discharged in stable condition  Patient ambulated off the department  Extensive return to emergency department precautions were discussed  Follow up with appropriate providers including primary care physician was discussed    Patient and/or their  primary decision maker expressed understanding  Patient remained stable during entire emergency department stay  Amount and/or Complexity of Data Reviewed  Clinical lab tests: ordered and reviewed  Tests in the radiology section of CPT®: ordered and reviewed  Tests in the medicine section of CPT®: ordered and reviewed  Review and summarize past medical records: yes  Independent visualization of images, tracings, or specimens: yes    Risk of Complications, Morbidity, and/or Mortality  Presenting problems: moderate  Diagnostic procedures: moderate  Management options: moderate    Patient Progress  Patient progress: stable      Disposition  Final diagnoses:   Chest pain   H/O interstitial lung disease     Time reflects when diagnosis was documented in both MDM as applicable and the Disposition within this note     Time User Action Codes Description Comment    8/9/2022  5:01 AM Chick Eyal Add [R07 9] Chest pain     8/9/2022  5:02 AM Chick Melcher[place] Add [Z87 [id number]] H/O interstitial lung disease       ED Disposition     ED Disposition   Discharge    Condition   Stable    Date/Time   Tue Aug 9, 2022  5:01 AM    Comment   [place] discharge to home/self care                 Follow-up Information     Follow up With Specialties Details Why Contact Info Additional 3 [facility] Emergency Department Emergency Medicine Go to  As needed, If symptoms worsen [facility] [id number]  [address] [place] [address] Emergency Department, [address] [facility] , [facility], [address] [place] [address] 200  Call  To schedule an appointment with a primary care physician [address] [facility], DO Family Medicine Schedule an appointment as soon as possible for a visit   [address] [facility] [address]  [address] [facility] Schedule an appointment as soon as possible for a visit   [facility], [address]  [place] [id number]  [address], [address] [facility] ([place], [place]), [place], [place], [address] [phone]          Patient's Medications   Discharge Prescriptions    No medications on file           PDMP Review     None          ED Provider  Electronically Signed by           Bel Sloan PA-C  08/09/22 3418

## 2022-08-09 NOTE — DISCHARGE INSTRUCTIONS
You were seen in the emergency department today for chest pain  Laboratory analysis, EKG, and Radiologic were reviewed without acute abnormalities  Please follow-up with your primary care physician and Cardiology regarding your symptoms  Return to the Emergency Department sooner if increased pain, shortness a breath, palpitations, lower extremity swelling, fever, vomiting, difficulty breathing, rash

## 2022-08-10 ENCOUNTER — OFFICE VISIT (OUTPATIENT)
Dept: FAMILY MEDICINE CLINIC | Facility: CLINIC | Age: 67
End: 2022-08-10
Payer: COMMERCIAL

## 2022-08-10 VITALS
RESPIRATION RATE: 16 BRPM | HEART RATE: 86 BPM | DIASTOLIC BLOOD PRESSURE: 80 MMHG | TEMPERATURE: 97.5 F | HEIGHT: 64 IN | WEIGHT: 203.4 LBS | BODY MASS INDEX: 34.72 KG/M2 | OXYGEN SATURATION: 98 % | SYSTOLIC BLOOD PRESSURE: 118 MMHG

## 2022-08-10 DIAGNOSIS — R79.89 POSITIVE D DIMER: ICD-10-CM

## 2022-08-10 DIAGNOSIS — R07.9 CHEST PAIN, UNSPECIFIED TYPE: Primary | ICD-10-CM

## 2022-08-10 DIAGNOSIS — J84.9 ILD (INTERSTITIAL LUNG DISEASE) (HCC): ICD-10-CM

## 2022-08-10 DIAGNOSIS — M79.89 LEG SWELLING: ICD-10-CM

## 2022-08-10 DIAGNOSIS — R52 GENERALIZED PAIN: ICD-10-CM

## 2022-08-10 PROCEDURE — 99214 OFFICE O/P EST MOD 30 MIN: CPT | Performed by: FAMILY MEDICINE

## 2022-08-10 PROCEDURE — 1101F PT FALLS ASSESS-DOCD LE1/YR: CPT | Performed by: FAMILY MEDICINE

## 2022-08-10 PROCEDURE — 1160F RVW MEDS BY RX/DR IN RCRD: CPT | Performed by: FAMILY MEDICINE

## 2022-08-10 PROCEDURE — 3725F SCREEN DEPRESSION PERFORMED: CPT | Performed by: FAMILY MEDICINE

## 2022-08-10 PROCEDURE — 3288F FALL RISK ASSESSMENT DOCD: CPT | Performed by: FAMILY MEDICINE

## 2022-08-10 RX ORDER — IBUPROFEN 800 MG/1
800 TABLET ORAL EVERY 8 HOURS PRN
Qty: 30 TABLET | Refills: 0 | Status: SHIPPED | OUTPATIENT
Start: 2022-08-10

## 2022-08-10 NOTE — ASSESSMENT & PLAN NOTE
D-dimer of 0 72 in the emergency department  CTA of the chest was negative for PE  Patient does state that she has some bilateral leg swelling left greater than right  Also has some pain around the left knee  No erythema noted on physical exam   With combination of elevated D-dimer and leg swelling, possible DVT      - will order venous duplex scan of bilateral lower extremities  - ED precautions if shortness of breath occurs

## 2022-08-10 NOTE — PROGRESS NOTES
Assessment/Plan:    Chest pain  Presents for emergency room follow-up for chest pain  Workup was negative at that time  Denies any chest pain at this time  She does have a history of interstitial lung disease and follows up with pulmonology  Pain did not seem pleuritic     - recommend continue follow-up with pulmonology  - ED precautions if chest pain occurs again    Generalized pain  Patient states that she has generalized pain and myalgias  Reports that only ibuprofen 800 mg has given her some relief  She says that she takes it about once a day  Kidney function within normal limits    - will order ibuprofen 800 mg    Positive D dimer  D-dimer of 0 72 in the emergency department  CTA of the chest was negative for PE  Patient does state that she has some bilateral leg swelling left greater than right  Also has some pain around the left knee  No erythema noted on physical exam   With combination of elevated D-dimer and leg swelling, possible DVT  - will order venous duplex scan of bilateral lower extremities  - ED precautions if shortness of breath occurs         Diagnoses and all orders for this visit:    Chest pain, unspecified type    Generalized pain  -     ibuprofen (MOTRIN) 800 mg tablet; Take 1 tablet (800 mg total) by mouth every 8 (eight) hours as needed for mild pain or moderate pain    Positive D dimer  -     VAS lower limb venous duplex study, complete bilateral; Future    Leg swelling  -     VAS lower limb venous duplex study, complete bilateral; Future    ILD (interstitial lung disease) (HCC)        Subjective:      Patient ID: Abdoul Mireles is a 74 y o  female  She presents today for follow-up from the emergency department for chest pain  Workup at that time was negative for cardiac cause  She did have an elevated D-dimer and a CTA was negative for PE  Today she states that her left leg feels a little swollen and somewhat painful    Denies any shortness of breath or chest pain at this time   Denies any other complaints at this time  The following portions of the patient's history were reviewed and updated as appropriate: allergies, current medications, past medical history and problem list     Review of Systems   Constitutional: Negative for activity change, appetite change, chills, fatigue and fever  HENT: Negative for congestion, rhinorrhea, sneezing and sore throat  Respiratory: Negative for cough, chest tightness, shortness of breath and wheezing  Cardiovascular: Negative for chest pain and palpitations  Gastrointestinal: Negative for abdominal distention, abdominal pain, constipation, diarrhea, nausea and vomiting  Musculoskeletal: Positive for arthralgias, gait problem (Uses a cane) and myalgias  Negative for back pain and joint swelling  Skin: Negative for rash  Neurological: Negative for dizziness, weakness, numbness and headaches  Psychiatric/Behavioral: Negative for confusion  All other systems reviewed and are negative  Objective:      /80 (BP Location: Left arm, Patient Position: Sitting, Cuff Size: Large)   Pulse 86   Temp 97 5 °F (36 4 °C) (Temporal)   Resp 16   Ht 5' 4" (1 626 m)   Wt 92 3 kg (203 lb 6 4 oz)   SpO2 98%   BMI 34 91 kg/m²          Physical Exam  Vitals reviewed  Constitutional:       General: She is not in acute distress  Appearance: She is well-developed  She is obese  She is not toxic-appearing or diaphoretic  HENT:      Head: Normocephalic and atraumatic  Nose: Nose normal  No congestion or rhinorrhea  Eyes:      General: No scleral icterus  Right eye: No discharge  Left eye: No discharge  Conjunctiva/sclera: Conjunctivae normal    Cardiovascular:      Rate and Rhythm: Normal rate and regular rhythm  Pulses: Normal pulses  Heart sounds: Normal heart sounds  No murmur heard  Pulmonary:      Effort: Pulmonary effort is normal  No respiratory distress        Breath sounds: Normal breath sounds  No wheezing  Abdominal:      General: There is no distension  Palpations: Abdomen is soft  Tenderness: There is no abdominal tenderness  Musculoskeletal:         General: Swelling (Bilateral lower extremities) and tenderness (Left Knee) present  Normal range of motion  Lymphadenopathy:      Cervical: No cervical adenopathy  Skin:     General: Skin is warm and dry  Coloration: Skin is not pale  Findings: No erythema  Neurological:      Mental Status: She is alert     Psychiatric:         Mood and Affect: Mood normal          Behavior: Behavior normal            Shayna Calvin MD

## 2022-08-10 NOTE — ASSESSMENT & PLAN NOTE
Patient states that she has generalized pain and myalgias  Reports that only ibuprofen 800 mg has given her some relief  She says that she takes it about once a day    Kidney function within normal limits    - will order ibuprofen 800 mg

## 2022-08-10 NOTE — ASSESSMENT & PLAN NOTE
Presents for emergency room follow-up for chest pain  Workup was negative at that time  Denies any chest pain at this time  She does have a history of interstitial lung disease and follows up with pulmonology    Pain did not seem pleuritic     - recommend continue follow-up with pulmonology  - ED precautions if chest pain occurs again

## 2022-08-16 ENCOUNTER — HOSPITAL ENCOUNTER (OUTPATIENT)
Dept: NON INVASIVE DIAGNOSTICS | Facility: HOSPITAL | Age: 67
Discharge: HOME | End: 2022-08-16
Attending: FAMILY MEDICINE
Payer: COMMERCIAL

## 2022-08-16 DIAGNOSIS — M79.89 LEG SWELLING: ICD-10-CM

## 2022-08-16 DIAGNOSIS — R79.89 POSITIVE D DIMER: ICD-10-CM

## 2022-08-16 PROCEDURE — 93970 EXTREMITY STUDY: CPT | Performed by: SURGERY

## 2022-08-16 PROCEDURE — 93970 EXTREMITY STUDY: CPT

## 2022-11-02 ENCOUNTER — OFFICE VISIT (OUTPATIENT)
Dept: FAMILY MEDICINE CLINIC | Facility: CLINIC | Age: 67
End: 2022-11-02

## 2022-11-02 VITALS
DIASTOLIC BLOOD PRESSURE: 80 MMHG | TEMPERATURE: 97.2 F | BODY MASS INDEX: 35.85 KG/M2 | WEIGHT: 210 LBS | HEIGHT: 64 IN | SYSTOLIC BLOOD PRESSURE: 122 MMHG

## 2022-11-02 DIAGNOSIS — J18.9 PNEUMONIA OF LEFT LOWER LOBE DUE TO INFECTIOUS ORGANISM: Primary | ICD-10-CM

## 2022-11-02 RX ORDER — BROMPHENIRAMINE MALEATE, PSEUDOEPHEDRINE HYDROCHLORIDE, AND DEXTROMETHORPHAN HYDROBROMIDE 2; 30; 10 MG/5ML; MG/5ML; MG/5ML
5 SYRUP ORAL 4 TIMES DAILY PRN
Qty: 240 ML | Refills: 0 | Status: SHIPPED | OUTPATIENT
Start: 2022-11-02

## 2022-11-02 RX ORDER — CEFUROXIME AXETIL 500 MG/1
500 TABLET ORAL EVERY 12 HOURS SCHEDULED
Qty: 20 TABLET | Refills: 0 | Status: SHIPPED | OUTPATIENT
Start: 2022-11-02 | End: 2022-11-12

## 2022-11-02 RX ORDER — ALBUTEROL SULFATE 90 UG/1
2 AEROSOL, METERED RESPIRATORY (INHALATION) EVERY 6 HOURS PRN
Qty: 18 G | Refills: 5 | Status: SHIPPED | OUTPATIENT
Start: 2022-11-02

## 2022-11-02 RX ORDER — PREDNISONE 10 MG/1
TABLET ORAL
Qty: 15 TABLET | Refills: 0 | Status: SHIPPED | OUTPATIENT
Start: 2022-11-02

## 2022-11-02 NOTE — PROGRESS NOTES
Chief Complaint   Patient presents with   • Cough   • Wheezing     X  2-3 days      Name: Kadie Galvan      : 1955      MRN: [id number]  Encounter Provider: Suyapa Lynn DO  Encounter Date: 2022   Encounter department: [facility] PRIMARY CARE    Assessment & Plan     1  Pneumonia of left lower lobe due to infectious organism  -     albuterol (Ventolin HFA) 90 mcg/act inhaler; Inhale 2 puffs every 6 (six) hours as needed for wheezing  -     predniSONE 10 mg tablet; 5 x 1 day, 4 x1 day, 3 x 1 day,2 x1 day,  1 x 1 day  -     cefuroxime (CEFTIN) 500 mg tablet; Take 1 tablet (500 mg total) by mouth every 12 (twelve) hours for 10 days  -     brompheniramine-pseudoephedrine-DM 30-2-10 MG/5ML syrup; Take 5 mL by mouth 4 (four) times a day as needed for congestion or cough           Subjective      She presents to the office complaining of 4 days cough, congestion, chest tightness, dyspnea, mild postnasal drainage and a sore throat  Her  was seen in the office yesterday diagnosed with bronchitis, her nephew also has bronchitis at this time  Patient did a COVID test yesterday was negative  She has not received a flu vaccine yet  Review of Systems   Constitutional: Positive for chills and fatigue  Negative for activity change, appetite change and fever  HENT: Positive for postnasal drip, rhinorrhea and sore throat  Negative for congestion, hearing loss, sinus pressure and sinus pain  Respiratory: Positive for cough and shortness of breath  Gastrointestinal: Negative for abdominal distention, constipation, diarrhea and nausea  Genitourinary: Negative  Musculoskeletal: Negative for myalgias  Skin: Negative for rash  Allergic/Immunologic: Positive for immunocompromised state  Neurological: Negative  Hematological: Negative  Psychiatric/Behavioral: Negative          Current Outpatient Medications on File Prior to Visit   Medication Sig   • albuterol (PROVENTIL HFA,VENTOLIN HFA) 90 mcg/act inhaler Inhale 2 puffs 4 (four) times a day as needed   • Biotin 1 MG CAPS Take by mouth   • desloratadine (CLARINEX) 5 MG tablet Take 1 tablet (5 mg total) by mouth daily   • montelukast (SINGULAIR) 10 mg tablet Take 1 tablet (10 mg total) by mouth daily at bedtime   • Multiple Minerals-Vitamins (CALCIUM-MAGNESIUM-ZINC-D3) TABS Take by mouth daily  • mycophenolate (CELLCEPT) 500 mg tablet TAKE THREE TABLETS BY MOUTH EVERY DAY IN THE MORNING AND TAKE TWO TABLETS BY MOUTH EVERY DAY AT BEDTIME   • naproxen (NAPROSYN) 500 mg tablet Take 1 tablet (500 mg total) by mouth 2 (two) times a day with meals   • olopatadine (PATANOL) 0 1 % ophthalmic solution Administer 1 drop to both eyes 2 (two) times a day   • omega-3-acid ethyl esters (LOVAZA) 1 g capsule Take 2 g by mouth 2 (two) times a day   • Specialty Vitamins Products (MAGNESIUM, AMINO ACID CHELATE,) 133 MG tablet Take 133 mg by mouth   • triamcinolone (KENALOG) 0 1 % oral topical paste Apply 1 application topically 2 (two) times a day   • Chromium Picolinate (CHROMIUM PICOLATE PO) Take by mouth daily  (Patient not taking: No sig reported)   • COD LIVER OIL PO Take by mouth daily  (Patient not taking: Reported on 35/6/5961)   • folic acid (FOLVITE) 1 mg tablet Take 1 mg by mouth daily  (Patient not taking: Reported on 11/2/2022)   • Multiple Vitamins-Minerals (MULTIVITAMIN WITH MINERALS) tablet Take 1 tablet by mouth daily     • [DISCONTINUED] brompheniramine-pseudoephedrine-dextromethorphan (DIMETAPP DM) 15-1-5 MG/5ML ELIX Take 10 mL by mouth every 6 (six) hours as needed for congestion or cough (Patient not taking: No sig reported)   • [DISCONTINUED] ibuprofen (MOTRIN) 800 mg tablet Take 1 tablet (800 mg total) by mouth every 8 (eight) hours as needed for mild pain or moderate pain (Patient not taking: Reported on 11/2/2022)   • [DISCONTINUED] predniSONE 10 mg tablet 5 x 1 day, 4 x1 day, 3 x 1 day,2 x1 day,  1 x 1 day (Patient not taking: No sig reported)       Objective     /80   Temp (!) 97 2 °F (36 2 °C)   Ht 5' 4" (1 626 m)   Wt 95 3 kg (210 lb)   BMI 36 05 kg/m²     Physical Exam  Constitutional:       General: She is not in acute distress  Appearance: She is well-developed  HENT:      Head: Normocephalic and atraumatic  Right Ear: Tympanic membrane normal  There is no impacted cerumen  Left Ear: Tympanic membrane normal  There is no impacted cerumen  Mouth/Throat:      Mouth: Mucous membranes are moist    Eyes:      General: No scleral icterus  Extraocular Movements: Extraocular movements intact  Conjunctiva/sclera: Conjunctivae normal    Pulmonary:      Effort: Pulmonary effort is normal       Breath sounds: Examination of the left-middle field reveals wheezing and rhonchi  Examination of the left-lower field reveals wheezing and rhonchi  Wheezing and rhonchi present  Abdominal:      General: Abdomen is flat  Neurological:      Mental Status: She is alert and oriented to person, place, and time     Psychiatric:         Judgment: Judgment normal        Lidia Munoz DO

## 2022-11-07 ENCOUNTER — HOSPITAL ENCOUNTER (OUTPATIENT)
Dept: RADIOLOGY | Facility: HOSPITAL | Age: 67
Discharge: HOME | End: 2022-11-07

## 2022-11-07 ENCOUNTER — TELEPHONE (OUTPATIENT)
Dept: FAMILY MEDICINE CLINIC | Facility: CLINIC | Age: 67
End: 2022-11-07

## 2022-11-07 DIAGNOSIS — J18.9 PNEUMONIA OF LEFT LOWER LOBE DUE TO INFECTIOUS ORGANISM: Primary | ICD-10-CM

## 2022-11-07 DIAGNOSIS — J18.9 PNEUMONIA OF LEFT LOWER LOBE DUE TO INFECTIOUS ORGANISM: ICD-10-CM

## 2022-11-08 ENCOUNTER — TELEPHONE (OUTPATIENT)
Dept: FAMILY MEDICINE CLINIC | Facility: CLINIC | Age: 67
End: 2022-11-08

## 2022-11-08 DIAGNOSIS — J84.9 ILD (INTERSTITIAL LUNG DISEASE) (HCC): Primary | ICD-10-CM

## 2022-11-08 RX ORDER — BENZONATATE 200 MG/1
200 CAPSULE ORAL 3 TIMES DAILY PRN
Qty: 20 CAPSULE | Refills: 0 | Status: SHIPPED | OUTPATIENT
Start: 2022-11-08 | End: 2022-11-17

## 2022-11-08 NOTE — TELEPHONE ENCOUNTER
Patient does see pulmonary at [facility]  She does get Ct Scan yearly she said  Not sure if what she had done in August of this year is the same as what you ordered

## 2022-11-08 NOTE — TELEPHONE ENCOUNTER
I sent in a different cough medicine for her, we should set her up for CT of the chest   She has a history of interstitial lung disease we should see how that is doing, that does not necessarily show up on chest x-ray  Also we should get her back to see Dr Johnnie Gonzalez the lung specialist   Referral is in the system

## 2022-11-08 NOTE — TELEPHONE ENCOUNTER
----- Message from Ayanna Landers DO sent at 11/7/2022  8:40 PM EST -----    No obvious pneumonia, heart failure, or tumors on the chest x-ray    Let me know how  she is doing

## 2022-11-17 ENCOUNTER — OFFICE VISIT (OUTPATIENT)
Dept: FAMILY MEDICINE CLINIC | Facility: CLINIC | Age: 67
End: 2022-11-17

## 2022-11-17 VITALS
DIASTOLIC BLOOD PRESSURE: 80 MMHG | BODY MASS INDEX: 35.41 KG/M2 | OXYGEN SATURATION: 96 % | HEART RATE: 74 BPM | HEIGHT: 64 IN | WEIGHT: 207.4 LBS | RESPIRATION RATE: 16 BRPM | TEMPERATURE: 98.1 F | SYSTOLIC BLOOD PRESSURE: 118 MMHG

## 2022-11-17 DIAGNOSIS — R05.3 CHRONIC COUGH: Primary | ICD-10-CM

## 2022-11-17 DIAGNOSIS — J84.9 ILD (INTERSTITIAL LUNG DISEASE) (HCC): ICD-10-CM

## 2022-11-17 DIAGNOSIS — J31.0 CHRONIC RHINITIS: ICD-10-CM

## 2022-11-17 RX ORDER — PREDNISONE 10 MG/1
TABLET ORAL
Qty: 15 TABLET | Refills: 0 | Status: SHIPPED | OUTPATIENT
Start: 2022-11-17

## 2022-11-17 RX ORDER — MONTELUKAST SODIUM 10 MG/1
10 TABLET ORAL
Qty: 90 TABLET | Refills: 0 | Status: SHIPPED | OUTPATIENT
Start: 2022-11-17

## 2022-11-17 RX ORDER — BROMPHENIRAMINE MALEATE, PSEUDOEPHEDRINE HYDROCHLORIDE, AND DEXTROMETHORPHAN HYDROBROMIDE 2; 30; 10 MG/5ML; MG/5ML; MG/5ML
5 SYRUP ORAL 4 TIMES DAILY PRN
Qty: 240 ML | Refills: 0 | Status: SHIPPED | OUTPATIENT
Start: 2022-11-17

## 2022-11-17 NOTE — PROGRESS NOTES
Name: Vivian House      : 1955      MRN: [id number]  Encounter Provider: Ulisses Villela DO  Encounter Date: 2022   Encounter department: [facility] PRIMARY CARE    Assessment & Plan     1  Chronic cough  Comments:  If no improvement with the medication changes, would recommend that she follow-up with her lung doctors  Orders:  -     brompheniramine-pseudoephedrine-DM 30-2-10 MG/5ML syrup; Take 5 mL by mouth 4 (four) times a day as needed for congestion or cough  -     predniSONE 10 mg tablet; 5 x 1 day, 4 x1 day, 3 x 1 day,2 x1 day,  1 x 1 day    2  Chronic rhinitis  -     montelukast (SINGULAIR) 10 mg tablet; Take 1 tablet (10 mg total) by mouth daily at bedtime    3  ILD (interstitial lung disease) [facility])         Chief Complaint   Patient presents with   • Cough     Patient states she has cough for the past 2 weeks      Subjective      She is complaining of ongoing issues with coughing  Chest x-ray did not show any pneumonia, failure, tumors, bronchitis  She denies any heartburn or reflux  She does have some issues with congestion postnasal drainage at this time  She has not currently taking montelukast which she has taken with some success in the past     Review of Systems   Constitutional: Negative for activity change, appetite change, chills, fatigue and fever  HENT: Positive for congestion, postnasal drip and rhinorrhea  Negative for ear pain, sinus pressure, sinus pain, sore throat and trouble swallowing  Eyes: Negative for pain and visual disturbance  Respiratory: Negative for cough, chest tightness, shortness of breath and wheezing  Cardiovascular: Negative for chest pain, palpitations and leg swelling  Gastrointestinal: Negative for abdominal pain and vomiting  Genitourinary: Negative for dysuria and hematuria  Musculoskeletal: Negative for arthralgias and back pain  Skin: Negative for color change and rash     Neurological: Negative for seizures and syncope  All other systems reviewed and are negative  Current Outpatient Medications on File Prior to Visit   Medication Sig   • albuterol (PROVENTIL HFA,VENTOLIN HFA) 90 mcg/act inhaler Inhale 2 puffs 4 (four) times a day as needed   • albuterol (Ventolin HFA) 90 mcg/act inhaler Inhale 2 puffs every 6 (six) hours as needed for wheezing   • Biotin 1 MG CAPS Take by mouth   • desloratadine (CLARINEX) 5 MG tablet Take 1 tablet (5 mg total) by mouth daily   • Multiple Minerals-Vitamins (CALCIUM-MAGNESIUM-ZINC-D3) TABS Take by mouth daily  • Multiple Vitamins-Minerals (MULTIVITAMIN WITH MINERALS) tablet Take 1 tablet by mouth daily     • mycophenolate (CELLCEPT) 500 mg tablet TAKE THREE TABLETS BY MOUTH EVERY DAY IN THE MORNING AND TAKE TWO TABLETS BY MOUTH EVERY DAY AT BEDTIME   • naproxen (NAPROSYN) 500 mg tablet Take 1 tablet (500 mg total) by mouth 2 (two) times a day with meals   • olopatadine (PATANOL) 0 1 % ophthalmic solution Administer 1 drop to both eyes 2 (two) times a day   • omega-3-acid ethyl esters (LOVAZA) 1 g capsule Take 2 g by mouth 2 (two) times a day   • Specialty Vitamins Products (MAGNESIUM, AMINO ACID CHELATE,) 133 MG tablet Take 133 mg by mouth   • triamcinolone (KENALOG) 0 1 % oral topical paste Apply 1 application topically 2 (two) times a day   • [DISCONTINUED] benzonatate (TESSALON) 200 MG capsule Take 1 capsule (200 mg total) by mouth 3 (three) times a day as needed for cough   • [DISCONTINUED] brompheniramine-pseudoephedrine-DM 30-2-10 MG/5ML syrup Take 5 mL by mouth 4 (four) times a day as needed for congestion or cough   • [DISCONTINUED] montelukast (SINGULAIR) 10 mg tablet Take 1 tablet (10 mg total) by mouth daily at bedtime   • [DISCONTINUED] predniSONE 10 mg tablet 5 x 1 day, 4 x1 day, 3 x 1 day,2 x1 day,  1 x 1 day       Objective     /80 (BP Location: Left arm, Patient Position: Sitting, Cuff Size: Large)   Pulse 74   Temp 98 1 °F (36 7 °C) (Temporal)   Resp 16  5' 4" (1 626 m)   Wt 94 1 kg (207 lb 6 4 oz)   SpO2 96%   BMI 35 60 kg/m²     Physical Exam  Constitutional:       General: She is not in acute distress  Appearance: She is well-developed and well-nourished  HENT:      Head: Normocephalic and atraumatic  Right Ear: Tympanic membrane, ear canal and external ear normal       Left Ear: Tympanic membrane, ear canal and external ear normal       Nose: Rhinorrhea present  Mouth/Throat:      Mouth: Oropharynx is clear and moist    Eyes:      Extraocular Movements: EOM normal       Conjunctiva/sclera: Conjunctivae normal       Pupils: Pupils are equal, round, and reactive to light  Neck:      Thyroid: No thyromegaly  Vascular: No JVD  Trachea: No tracheal deviation  Cardiovascular:      Rate and Rhythm: Normal rate and regular rhythm  Heart sounds: Normal heart sounds  No murmur heard  Pulmonary:      Effort: Pulmonary effort is normal       Breath sounds: Normal breath sounds  No wheezing or rales  Abdominal:      General: Bowel sounds are normal       Palpations: Abdomen is soft  There is no mass  Tenderness: There is no abdominal tenderness  There is no guarding or rebound  Musculoskeletal:         General: No tenderness or edema  Normal range of motion  Cervical back: Normal range of motion and neck supple  Lymphadenopathy:      Cervical: No cervical adenopathy  Skin:     General: Skin is warm and dry  Capillary Refill: Capillary refill takes less than 2 seconds  Findings: No lesion or rash  Neurological:      General: No focal deficit present  Mental Status: She is alert and oriented to person, place, and time  Cranial Nerves: No cranial nerve deficit  Deep Tendon Reflexes: Reflexes are normal and symmetric     Psychiatric:         Mood and Affect: Mood and affect and mood normal          Judgment: Judgment normal        Shlomo Bates DO

## 2022-12-09 ENCOUNTER — TELEPHONE (OUTPATIENT)
Dept: ADMINISTRATIVE | Facility: OTHER | Age: 67
End: 2022-12-09

## 2022-12-09 NOTE — TELEPHONE ENCOUNTER
Upon review of the In Basket request we were able to locate, review, and update the patient chart as requested for Mammogram     Any additional questions or concerns should be emailed to the Practice Liaisons via the appropriate education email address, please do not reply via In Basket      Thank you  Megan Griffin MA

## 2022-12-09 NOTE — TELEPHONE ENCOUNTER
----- Message from Ada Parkinson sent at 12/9/2022  9:21 AM EST -----  Regarding: care gap request - mammo  12/09/22 9:21 AM    Hello, our patient attached above has had Mammogram completed/performed  Please assist in updating the patient chart by pulling the Care Everywhere (CE) document  The date of service is 12/05/2022       Thank you,  [facility]

## 2023-01-19 ENCOUNTER — OFFICE VISIT (OUTPATIENT)
Dept: FAMILY MEDICINE CLINIC | Facility: CLINIC | Age: 68
End: 2023-01-19

## 2023-01-19 VITALS
BODY MASS INDEX: 36.67 KG/M2 | DIASTOLIC BLOOD PRESSURE: 82 MMHG | WEIGHT: 214.8 LBS | TEMPERATURE: 97.8 F | HEIGHT: 64 IN | SYSTOLIC BLOOD PRESSURE: 124 MMHG

## 2023-01-19 DIAGNOSIS — J01.10 ACUTE NON-RECURRENT FRONTAL SINUSITIS: Primary | ICD-10-CM

## 2023-01-19 RX ORDER — PREDNISONE 10 MG/1
TABLET ORAL
Qty: 15 TABLET | Refills: 0 | Status: SHIPPED | OUTPATIENT
Start: 2023-01-19

## 2023-01-19 RX ORDER — AMOXICILLIN 875 MG/1
875 TABLET, COATED ORAL 2 TIMES DAILY
Qty: 20 TABLET | Refills: 0 | Status: SHIPPED | OUTPATIENT
Start: 2023-01-19 | End: 2023-01-29

## 2023-01-19 NOTE — PROGRESS NOTES
Chief Complaint   Patient presents with   • Cough   • Nasal Congestion   • Sore Throat      X 4-5 days      Name: Lauryn Obregon      : 1955      MRN: [id number]  Encounter Provider: Kathie Meier DO  Encounter Date: 2023   Encounter department: [address] [place] [address] PRIMARY CARE    Assessment & Plan     {There are no diagnoses linked to this encounter  (Refresh or delete this SmartLink)}       Subjective      HPI  Review of Systems    Current Outpatient Medications on File Prior to Visit   Medication Sig   • albuterol (PROVENTIL HFA,VENTOLIN HFA) 90 mcg/act inhaler Inhale 2 puffs 4 (four) times a day as needed   • albuterol (Ventolin HFA) 90 mcg/act inhaler Inhale 2 puffs every 6 (six) hours as needed for wheezing   • Biotin 1 MG CAPS Take by mouth   • brompheniramine-pseudoephedrine-DM 30-2-10 MG/5ML syrup Take 5 mL by mouth 4 (four) times a day as needed for congestion or cough   • desloratadine (CLARINEX) 5 MG tablet Take 1 tablet (5 mg total) by mouth daily   • montelukast (SINGULAIR) 10 mg tablet Take 1 tablet (10 mg total) by mouth daily at bedtime   • Multiple Minerals-Vitamins (CALCIUM-MAGNESIUM-ZINC-D3) TABS Take by mouth daily  • Multiple Vitamins-Minerals (MULTIVITAMIN WITH MINERALS) tablet Take 1 tablet by mouth daily     • mycophenolate (CELLCEPT) 500 mg tablet TAKE THREE TABLETS BY MOUTH EVERY DAY IN THE MORNING AND TAKE TWO TABLETS BY MOUTH EVERY DAY AT BEDTIME   • naproxen (NAPROSYN) 500 mg tablet Take 1 tablet (500 mg total) by mouth 2 (two) times a day with meals   • olopatadine (PATANOL) 0 1 % ophthalmic solution Administer 1 drop to both eyes 2 (two) times a day   • omega-3-acid ethyl esters (LOVAZA) 1 g capsule Take 2 g by mouth 2 (two) times a day   • Specialty Vitamins Products (MAGNESIUM, AMINO ACID CHELATE,) 133 MG tablet Take 133 mg by mouth   • triamcinolone (KENALOG) 0 1 % oral topical paste Apply 1 application topically 2 (two) times a day   • predniSONE 10 mg tablet 5 x 1 day, 4 x1 day, 3 x 1 day,2 x1 day,  1 x 1 day (Patient not taking: Reported on 1/19/2023)       Objective     /82   Temp 97 8 °F (36 6 °C)   Ht 5' 4" (1 626 m)   Wt 97 4 kg (214 lb 12 8 oz)   BMI 36 87 kg/m²     Physical Exam  Christian Mojica,

## 2023-01-19 NOTE — PROGRESS NOTES
COVID-19 Outpatient Progress Note    Assessment/Plan:    Problem List Items Addressed This Visit    None  Visit Diagnoses     Acute non-recurrent frontal sinusitis    -  Primary    Relevant Medications    amoxicillin (AMOXIL) 875 mg tablet    predniSONE 10 mg tablet         Disposition:     After clarifying the patient's history, my suspicion for COVID-19 infection is very low  Take the medication as directed, call the office if there is no improvement    I have spent 12 minutes directly with the patient  Greater than 50% of this time was spent in counseling/coordination of care regarding: diagnostic results, prognosis, risks and benefits of treatment options, instructions for management, patient and family education, importance of treatment compliance and risk factor reductions  Encounter provider: Mansfield Schlatter, DO     Provider located at: [address] [place] [address] PRIMARY CARE  [address] [place] [address] [address]  [address] [address] [phone]     Recent Visits  No visits were found meeting these conditions  Showing recent visits within past 7 days and meeting all other requirements  Today's Visits  Date Type Provider Dept   01/19/23 Office Visit Mansfield Schlatter, [facility] Primary Care   Showing today's visits and meeting all other requirements  Future Appointments  No visits were found meeting these conditions  Showing future appointments within next 150 days and meeting all other requirements     Subjective:   Suzi Griffiths is a 69 y o  female who is concerned about COVID-19  Patient's symptoms include nasal congestion, rhinorrhea, sore throat, cough and headache  Patient denies fever, chills, fatigue, malaise, anosmia, loss of taste, shortness of breath, chest tightness, abdominal pain, nausea, vomiting, diarrhea and myalgias       - Date of symptom onset: 1/14/2023      COVID-19 vaccination status: Not vaccinated    Exposure:   Contact with a person who is under investigation (PUI) for or who is positive for COVID-19 within the last 14 days?: No    Hospitalized recently for fever and/or lower respiratory symptoms?: No      Currently a healthcare worker that is involved in direct patient care?: No      Works in a special setting where the risk of COVID-19 transmission may be high? (this may include long-term care, correctional and intermediate facilities; homeless shelters; assisted-living facilities and group homes ): No      Resident in a special setting where the risk of COVID-19 transmission may be high? (this may include long-term care, correctional and intermediate facilities; homeless shelters; assisted-living facilities and group homes ): No      2 negative covid tests    Lab Results   Component Value Date    SARSCOV2 Positive (A) 02/06/2021    Mendy Hind Not Detected 11/23/2020    Mendy Hind Not Detected 10/30/2020       Review of Systems   Constitutional: Negative for activity change, appetite change, chills, fatigue and fever  HENT: Positive for congestion, ear pain, postnasal drip, rhinorrhea, sinus pressure, sinus pain and sore throat  Respiratory: Positive for cough  Negative for chest tightness, shortness of breath and wheezing  Cardiovascular: Negative for chest pain  Gastrointestinal: Negative for abdominal pain, diarrhea, nausea and vomiting  Musculoskeletal: Negative for arthralgias and myalgias  Neurological: Positive for headaches       Current Outpatient Medications on File Prior to Visit   Medication Sig   • albuterol (PROVENTIL HFA,VENTOLIN HFA) 90 mcg/act inhaler Inhale 2 puffs 4 (four) times a day as needed   • albuterol (Ventolin HFA) 90 mcg/act inhaler Inhale 2 puffs every 6 (six) hours as needed for wheezing   • Biotin 1 MG CAPS Take by mouth   • brompheniramine-pseudoephedrine-DM 30-2-10 MG/5ML syrup Take 5 mL by mouth 4 (four) times a day as needed for congestion or cough   • desloratadine (CLARINEX) 5 MG tablet Take 1 tablet (5 mg total) by mouth daily   • montelukast (SINGULAIR) 10 mg tablet Take 1 tablet (10 mg total) by mouth daily at bedtime   • Multiple Minerals-Vitamins (CALCIUM-MAGNESIUM-ZINC-D3) TABS Take by mouth daily  • Multiple Vitamins-Minerals (MULTIVITAMIN WITH MINERALS) tablet Take 1 tablet by mouth daily  • mycophenolate (CELLCEPT) 500 mg tablet TAKE THREE TABLETS BY MOUTH EVERY DAY IN THE MORNING AND TAKE TWO TABLETS BY MOUTH EVERY DAY AT BEDTIME   • naproxen (NAPROSYN) 500 mg tablet Take 1 tablet (500 mg total) by mouth 2 (two) times a day with meals   • olopatadine (PATANOL) 0 1 % ophthalmic solution Administer 1 drop to both eyes 2 (two) times a day   • omega-3-acid ethyl esters (LOVAZA) 1 g capsule Take 2 g by mouth 2 (two) times a day   • Specialty Vitamins Products (MAGNESIUM, AMINO ACID CHELATE,) 133 MG tablet Take 133 mg by mouth   • triamcinolone (KENALOG) 0 1 % oral topical paste Apply 1 application topically 2 (two) times a day   • [DISCONTINUED] predniSONE 10 mg tablet 5 x 1 day, 4 x1 day, 3 x 1 day,2 x1 day,  1 x 1 day (Patient not taking: Reported on 1/19/2023)       Objective:    /82   Temp 97 8 °F (36 6 °C)   Ht 5' 4" (1 626 m)   Wt 97 4 kg (214 lb 12 8 oz)   BMI 36 87 kg/m²      Physical Exam  Vitals and nursing note reviewed  Constitutional:       General: She is not in acute distress  Appearance: She is well-developed  HENT:      Head: Normocephalic and atraumatic  Right Ear: Tympanic membrane is not erythematous  Left Ear: Tympanic membrane normal  Tympanic membrane is not erythematous  Nose: Congestion and rhinorrhea present  Rhinorrhea is purulent  Right Turbinates: Enlarged and swollen  Not pale  Left Turbinates: Enlarged and swollen  Not pale  Right Sinus: Maxillary sinus tenderness present  Left Sinus: Maxillary sinus tenderness present  Mouth/Throat:      Mouth: No oral lesions        Pharynx: No pharyngeal swelling or posterior oropharyngeal erythema  Eyes:      Conjunctiva/sclera: Conjunctivae normal    Cardiovascular:      Rate and Rhythm: Normal rate and regular rhythm  Heart sounds: No murmur heard  Pulmonary:      Effort: Pulmonary effort is normal  No respiratory distress  Breath sounds: Normal breath sounds  Abdominal:      Palpations: Abdomen is soft  Tenderness: There is no abdominal tenderness  Musculoskeletal:         General: No swelling  Cervical back: Neck supple  Skin:     General: Skin is warm and dry  Capillary Refill: Capillary refill takes less than 2 seconds  Neurological:      Mental Status: She is alert     Psychiatric:         Mood and Affect: Mood normal        Malini Maya DO

## 2023-02-12 DIAGNOSIS — J31.0 CHRONIC RHINITIS: ICD-10-CM

## 2023-02-13 RX ORDER — MONTELUKAST SODIUM 10 MG/1
TABLET ORAL
Qty: 90 TABLET | Refills: 0 | Status: SHIPPED | OUTPATIENT
Start: 2023-02-13

## 2023-05-18 DIAGNOSIS — J31.0 CHRONIC RHINITIS: ICD-10-CM

## 2023-05-18 RX ORDER — MONTELUKAST SODIUM 10 MG/1
TABLET ORAL
Qty: 90 TABLET | Refills: 0 | Status: SHIPPED | OUTPATIENT
Start: 2023-05-18

## 2023-06-19 ENCOUNTER — APPOINTMENT (OUTPATIENT)
Dept: LAB | Facility: HOSPITAL | Age: 68
End: 2023-06-19
Payer: COMMERCIAL

## 2023-06-19 DIAGNOSIS — J84.9 ILD (INTERSTITIAL LUNG DISEASE) (HCC): ICD-10-CM

## 2023-06-19 LAB
ALBUMIN SERPL BCP-MCNC: 4.1 G/DL (ref 3.5–5)
ALP SERPL-CCNC: 99 U/L (ref 34–104)
ALT SERPL W P-5'-P-CCNC: 13 U/L (ref 7–52)
ANION GAP SERPL CALCULATED.3IONS-SCNC: 5 MMOL/L (ref 4–13)
AST SERPL W P-5'-P-CCNC: 21 U/L (ref 13–39)
BASOPHILS # BLD AUTO: 0 THOUSANDS/ÂΜL (ref 0–0.1)
BASOPHILS NFR BLD AUTO: 0 % (ref 0–1)
BILIRUB SERPL-MCNC: 0.4 MG/DL (ref 0.2–1)
BUN SERPL-MCNC: 17 MG/DL (ref 5–25)
CALCIUM SERPL-MCNC: 9.4 MG/DL (ref 8.4–10.2)
CHLORIDE SERPL-SCNC: 103 MMOL/L (ref 96–108)
CO2 SERPL-SCNC: 30 MMOL/L (ref 21–32)
CREAT SERPL-MCNC: 0.57 MG/DL (ref 0.6–1.3)
CRP SERPL QL: 4.2 MG/L
EOSINOPHIL # BLD AUTO: 0.05 THOUSAND/ÂΜL (ref 0–0.61)
EOSINOPHIL NFR BLD AUTO: 1 % (ref 0–6)
ERYTHROCYTE [DISTWIDTH] IN BLOOD BY AUTOMATED COUNT: 14.4 % (ref 11.6–15.1)
GFR SERPL CREATININE-BSD FRML MDRD: 94 ML/MIN/1.73SQ M
GLUCOSE P FAST SERPL-MCNC: 89 MG/DL (ref 65–99)
HCT VFR BLD AUTO: 36.9 % (ref 34.8–46.1)
HGB BLD-MCNC: 11.5 G/DL (ref 11.5–15.4)
IMM GRANULOCYTES # BLD AUTO: 0.01 THOUSAND/UL (ref 0–0.2)
IMM GRANULOCYTES NFR BLD AUTO: 0 % (ref 0–2)
LYMPHOCYTES # BLD AUTO: 0.93 THOUSANDS/ÂΜL (ref 0.6–4.47)
LYMPHOCYTES NFR BLD AUTO: 26 % (ref 14–44)
MCH RBC QN AUTO: 28.9 PG (ref 26.8–34.3)
MCHC RBC AUTO-ENTMCNC: 31.2 G/DL (ref 31.4–37.4)
MCV RBC AUTO: 93 FL (ref 82–98)
MONOCYTES # BLD AUTO: 0.44 THOUSAND/ÂΜL (ref 0.17–1.22)
MONOCYTES NFR BLD AUTO: 12 % (ref 4–12)
NEUTROPHILS # BLD AUTO: 2.21 THOUSANDS/ÂΜL (ref 1.85–7.62)
NEUTS SEG NFR BLD AUTO: 61 % (ref 43–75)
NRBC BLD AUTO-RTO: 0 /100 WBCS
PLATELET # BLD AUTO: 260 THOUSANDS/UL (ref 149–390)
PMV BLD AUTO: 8.9 FL (ref 8.9–12.7)
POTASSIUM SERPL-SCNC: 4.3 MMOL/L (ref 3.5–5.3)
PROT SERPL-MCNC: 8 G/DL (ref 6.4–8.4)
RBC # BLD AUTO: 3.98 MILLION/UL (ref 3.81–5.12)
SODIUM SERPL-SCNC: 138 MMOL/L (ref 135–147)
WBC # BLD AUTO: 3.64 THOUSAND/UL (ref 4.31–10.16)

## 2023-06-19 PROCEDURE — 86140 C-REACTIVE PROTEIN: CPT

## 2023-06-19 PROCEDURE — 80053 COMPREHEN METABOLIC PANEL: CPT

## 2023-06-19 PROCEDURE — 36415 COLL VENOUS BLD VENIPUNCTURE: CPT

## 2023-06-19 PROCEDURE — 85025 COMPLETE CBC W/AUTO DIFF WBC: CPT

## 2023-06-27 ENCOUNTER — OFFICE VISIT (OUTPATIENT)
Dept: FAMILY MEDICINE CLINIC | Facility: CLINIC | Age: 68
End: 2023-06-27
Payer: COMMERCIAL

## 2023-06-27 VITALS
WEIGHT: 208.4 LBS | BODY MASS INDEX: 35.58 KG/M2 | SYSTOLIC BLOOD PRESSURE: 135 MMHG | DIASTOLIC BLOOD PRESSURE: 80 MMHG | HEIGHT: 64 IN | TEMPERATURE: 97.8 F | OXYGEN SATURATION: 99 % | HEART RATE: 64 BPM

## 2023-06-27 DIAGNOSIS — J84.9 ILD (INTERSTITIAL LUNG DISEASE) (HCC): ICD-10-CM

## 2023-06-27 DIAGNOSIS — J01.40 ACUTE NON-RECURRENT PANSINUSITIS: ICD-10-CM

## 2023-06-27 DIAGNOSIS — K21.9 GASTROESOPHAGEAL REFLUX DISEASE WITHOUT ESOPHAGITIS: Primary | ICD-10-CM

## 2023-06-27 DIAGNOSIS — M35.02 SJOGREN'S SYNDROME WITH LUNG INVOLVEMENT (HCC): ICD-10-CM

## 2023-06-27 PROCEDURE — 99213 OFFICE O/P EST LOW 20 MIN: CPT | Performed by: FAMILY MEDICINE

## 2023-06-27 RX ORDER — IBUPROFEN 800 MG/1
TABLET ORAL
COMMUNITY
Start: 2023-05-29

## 2023-06-27 RX ORDER — OMEPRAZOLE 20 MG/1
20 CAPSULE, DELAYED RELEASE ORAL DAILY
Qty: 30 CAPSULE | Refills: 0 | Status: SHIPPED | OUTPATIENT
Start: 2023-06-27

## 2023-06-27 RX ORDER — KETOCONAZOLE 20 MG/G
CREAM TOPICAL
COMMUNITY
Start: 2023-05-16

## 2023-06-27 RX ORDER — AMOXICILLIN 875 MG/1
875 TABLET, COATED ORAL 2 TIMES DAILY
Qty: 20 TABLET | Refills: 0 | Status: SHIPPED | OUTPATIENT
Start: 2023-06-27 | End: 2023-07-07

## 2023-06-27 NOTE — PROGRESS NOTES
Name: Francesca Gracie      : 1955      MRN: [id number]  Encounter Provider: Vibha Kern DO  Encounter Date: 2023   Encounter department: [facility] PRIMARY CARE    Assessment & Plan     1  Gastroesophageal reflux disease without esophagitis  Assessment & Plan:  4 weeks of omeprazole and then recheck Patient to avoid NSAID's and reviewed diet    Orders:  -     omeprazole (PriLOSEC) 20 mg delayed release capsule; Take 1 capsule (20 mg total) by mouth daily    2  Acute non-recurrent pansinusitis  Assessment & Plan:  Antibiotic as directed     Orders:  -     amoxicillin (AMOXIL) 875 mg tablet; Take 1 tablet (875 mg total) by mouth 2 (two) times a day for 10 days    3  Sjogren's syndrome with lung involvement ([facility] )  Assessment & Plan:  Continue to see the specialist and continue medication reviewed last note fromrheumatology      4  [facility] (inter[facility] disease) ([facility] )  Assessment & Plan:  contnue as needed inhaler           Subjective      Patient is here for issues with heartburn for last week Patient felt it was likely due to ibuprofen She was taking that 2-3 times per day for her knee DJD Patient has decreased that and took prilosec last 2 days Patient did not have the heartburn last night but did have the upper stomach pain Patient is also having sinus pain discolored mucous and ear pain Patient has had this for over one week However last night it kept her up She has no fever or chills Patient notes no changes in diet and no travel Patient has no change in bowel habits either  Patient sees rheumatology for her sjorgen also Patient notes the post nasal drip is causing her occasional wheezing and she is using inhaler as directed    Heartburn  She complains of abdominal pain, belching, heartburn and nausea   She reports no chest pain, no choking, no coughing, no dysphagia, no early satiety, no globus sensation, no hoarse voice, no sore throat, no stridor, no tooth decay, no water brash or no wheezing  This is a new problem  The current episode started in the past 7 days  The problem occurs frequently  The problem has been unchanged  The heartburn duration is an hour  The heartburn is located in the RUQ  The heartburn is of moderate intensity  The heartburn wakes her from sleep  The heartburn does not limit her activity  The heartburn doesn't change with position  The symptoms are aggravated by bending and lying down  Pertinent negatives include no anemia, fatigue, melena, muscle weakness, orthopnea or weight loss  Risk factors include hiatal hernia, lack of exercise, obesity and NSAIDs  She has tried a PPI for the symptoms  Past procedures do not include an abdominal ultrasound, an EGD, esophageal manometry, esophageal pH monitoring, H  pylori antibody titer or a UGI  Past invasive treatments do not include gastroplasty, gastroplication or reflux surgery  Review of Systems   Constitutional: Negative for activity change, appetite change, fatigue and weight loss  HENT: Positive for congestion, postnasal drip, rhinorrhea, sinus pressure and sinus pain  Negative for hoarse voice and sore throat  Respiratory: Negative for cough, choking and wheezing  Cardiovascular: Negative for chest pain  Gastrointestinal: Positive for abdominal pain, heartburn and nausea  Negative for dysphagia and melena  Musculoskeletal: Negative for muscle weakness         Current Outpatient Medications on File Prior to Visit   Medication Sig   • albuterol (PROVENTIL HFA,VENTOLIN HFA) 90 mcg/act inhaler Inhale 2 puffs 4 (four) times a day as needed   • albuterol (Ventolin HFA) 90 mcg/act inhaler Inhale 2 puffs every 6 (six) hours as needed for wheezing   • Biotin 1 MG CAPS Take by mouth   • desloratadine (CLARINEX) 5 MG tablet Take 1 tablet (5 mg total) by mouth daily   • montelukast (SINGULAIR) 10 mg tablet TAKE 1 TABLET BY MOUTH EVERYDAY AT BEDTIME   • Multiple Minerals-Vitamins (CALCIUM-MAGNESIUM-ZINC-D3) TABS Take "by mouth daily  • Multiple Vitamins-Minerals (MULTIVITAMIN WITH MINERALS) tablet Take 1 tablet by mouth daily  • mycophenolate (CELLCEPT) 500 mg tablet TAKE THREE TABLETS BY MOUTH EVERY DAY IN THE MORNING AND TAKE TWO TABLETS BY MOUTH EVERY DAY AT BEDTIME   • olopatadine (PATANOL) 0 1 % ophthalmic solution Administer 1 drop to both eyes 2 (two) times a day   • omega-3-acid ethyl esters (LOVAZA) 1 g capsule Take 2 g by mouth 2 (two) times a day   • Specialty Vitamins Products (MAGNESIUM, AMINO ACID CHELATE,) 133 MG tablet Take 133 mg by mouth   • triamcinolone (KENALOG) 0 1 % oral topical paste Apply 1 application topically 2 (two) times a day   • [DISCONTINUED] brompheniramine-pseudoephedrine-DM 30-2-10 MG/5ML syrup Take 5 mL by mouth 4 (four) times a day as needed for congestion or cough   • [DISCONTINUED] naproxen (NAPROSYN) 500 mg tablet Take 1 tablet (500 mg total) by mouth 2 (two) times a day with meals   • [DISCONTINUED] predniSONE 10 mg tablet 5 x 1 day, 4 x1 day, 3 x 1 day,2 x1 day,  1 x 1 day   • ibuprofen (MOTRIN) 800 mg tablet TAKE 1 TABLET (800 MG) BY MOUTH 2 TIMES A DAY AS NEEDED FOR MODERATE PAIN (PAIN SCORE 4-6)  • ketoconazole (NIZORAL) 2 % cream APPLY TWICE DAILY TO FOOT FOR 6 WEEKS       Objective     /80 (BP Location: Left arm, Patient Position: Sitting, Cuff Size: Large)   Pulse 64   Temp 97 8 °F (36 6 °C)   Ht 5' 4\" (1 626 m)   Wt 94 5 kg (208 lb 6 4 oz)   SpO2 99%   BMI 35 77 kg/m²     Physical Exam  Vitals and nursing note reviewed  Constitutional:       Appearance: She is obese  HENT:      Head: Normocephalic  Comments: Frontal and maxillary sinus pain to palpation     Right Ear: Tympanic membrane and external ear normal       Left Ear: Tympanic membrane and external ear normal       Nose: Congestion present  Eyes:      Extraocular Movements: Extraocular movements intact        Conjunctiva/sclera: Conjunctivae normal       Pupils: Pupils are equal, round, and " reactive to light  Cardiovascular:      Rate and Rhythm: Normal rate and regular rhythm  Heart sounds: Normal heart sounds  Pulmonary:      Effort: Pulmonary effort is normal       Breath sounds: Normal breath sounds  Abdominal:      General: Bowel sounds are normal       Palpations: Abdomen is soft  Tenderness: There is abdominal tenderness  There is no right CVA tenderness or left CVA tenderness  Comments: Epigastric tenderness   Skin:     Findings: No rash  Neurological:      General: No focal deficit present  Mental Status: She is alert and oriented to person, place, and time     Psychiatric:         Mood and Affect: Mood normal          Behavior: Behavior normal        Edis Halo, DO

## 2023-06-27 NOTE — PATIENT INSTRUCTIONS
Diet for Stomach Ulcers and Gastritis   AMBULATORY CARE:   A diet for stomach ulcers and gastritis  is a meal plan that limits foods that irritate your stomach  Certain foods may worsen symptoms such as stomach pain, bloating, heartburn, or indigestion  Foods to limit or avoid:  You may need to avoid acidic, spicy, or high-fat foods  Not all foods affect everyone the same way  You will need to learn which foods worsen your symptoms and limit those foods  The following are some foods that may worsen ulcer or gastritis symptoms:  Beverages:      Whole milk and chocolate milk    Hot cocoa and cola    Any beverage with caffeine    Regular and decaffeinated coffee    Peppermint and spearmint tea    Green and black tea, with or without caffeine    Orange and grapefruit juices    Drinks that contain alcohol    Spices and seasonings:      Black and red pepper    Chili powder    Mustard seed and nutmeg    Other foods:      Dairy foods made from whole milk or cream    Chocolate    Spicy or strongly flavored cheeses, such as jalapeno or black pepper    Highly seasoned, high-fat meats, such as sausage, salami, velazquez, ham, and cold cuts    Hot chiles and peppers    Tomato products, such as tomato paste, tomato sauce, or tomato juice    Foods to include:  Eat a variety of healthy foods from all the food groups  Eat fruits, vegetables, whole grains, and fat-free or low-fat dairy foods  Whole grains include whole-wheat breads, cereals, pasta, and brown rice  Choose lean meats, poultry (chicken and turkey), fish, beans, eggs, and nuts  A healthy meal plan is low in unhealthy fats, salt, and added sugar  Healthy fats include olive oil and canola oil  Ask your dietitian for more information about a healthy diet  Other helpful guidelines:   Do not eat right before bedtime  Stop eating at least 2 hours before bedtime  Eat small, frequent meals  Your stomach may tolerate small, frequent meals better than large meals      © Copyright [employer] 2022 Information is for End User's use only and may not be sold, redistributed or otherwise used for commercial purposes  The above information is an  only  It is not intended as medical advice for individual conditions or treatments  Talk to your doctor, nurse or pharmacist before following any medical regimen to see if it is safe and effective for you

## 2023-07-19 DIAGNOSIS — K21.9 GASTROESOPHAGEAL REFLUX DISEASE WITHOUT ESOPHAGITIS: ICD-10-CM

## 2023-07-19 RX ORDER — OMEPRAZOLE 20 MG/1
CAPSULE, DELAYED RELEASE ORAL
Qty: 90 CAPSULE | Refills: 1 | Status: SHIPPED | OUTPATIENT
Start: 2023-07-19

## 2023-07-25 ENCOUNTER — OFFICE VISIT (OUTPATIENT)
Dept: FAMILY MEDICINE CLINIC | Facility: CLINIC | Age: 68
End: 2023-07-25
Payer: COMMERCIAL

## 2023-07-25 VITALS
SYSTOLIC BLOOD PRESSURE: 122 MMHG | DIASTOLIC BLOOD PRESSURE: 80 MMHG | BODY MASS INDEX: 36.67 KG/M2 | HEIGHT: 64 IN | WEIGHT: 214.8 LBS

## 2023-07-25 DIAGNOSIS — M17.0 PRIMARY OSTEOARTHRITIS OF BOTH KNEES: ICD-10-CM

## 2023-07-25 DIAGNOSIS — Z13.220 SCREENING FOR CHOLESTEROL LEVEL: ICD-10-CM

## 2023-07-25 DIAGNOSIS — Z00.00 MEDICARE ANNUAL WELLNESS VISIT, SUBSEQUENT: ICD-10-CM

## 2023-07-25 DIAGNOSIS — E55.9 VITAMIN D DEFICIENCY: ICD-10-CM

## 2023-07-25 DIAGNOSIS — Z12.31 ENCOUNTER FOR SCREENING MAMMOGRAM FOR MALIGNANT NEOPLASM OF BREAST: ICD-10-CM

## 2023-07-25 DIAGNOSIS — J84.9 ILD (INTERSTITIAL LUNG DISEASE) (HCC): ICD-10-CM

## 2023-07-25 DIAGNOSIS — M35.02 SJOGREN'S SYNDROME WITH LUNG INVOLVEMENT (HCC): ICD-10-CM

## 2023-07-25 DIAGNOSIS — Z23 ENCOUNTER FOR IMMUNIZATION: Primary | ICD-10-CM

## 2023-07-25 DIAGNOSIS — K21.9 GASTROESOPHAGEAL REFLUX DISEASE WITHOUT ESOPHAGITIS: ICD-10-CM

## 2023-07-25 DIAGNOSIS — E66.01 CLASS 2 SEVERE OBESITY DUE TO EXCESS CALORIES WITH SERIOUS COMORBIDITY AND BODY MASS INDEX (BMI) OF 36.0 TO 36.9 IN ADULT (HCC): ICD-10-CM

## 2023-07-25 PROBLEM — R79.89 POSITIVE D DIMER: Status: RESOLVED | Noted: 2022-08-10 | Resolved: 2023-07-25

## 2023-07-25 PROBLEM — M79.641 RIGHT HAND PAIN: Status: RESOLVED | Noted: 2019-01-03 | Resolved: 2023-07-25

## 2023-07-25 PROBLEM — M79.89 LEG SWELLING: Status: RESOLVED | Noted: 2022-08-10 | Resolved: 2023-07-25

## 2023-07-25 PROBLEM — J01.40 ACUTE NON-RECURRENT PANSINUSITIS: Status: RESOLVED | Noted: 2023-06-27 | Resolved: 2023-07-25

## 2023-07-25 PROBLEM — R07.9 CHEST PAIN: Status: RESOLVED | Noted: 2022-08-10 | Resolved: 2023-07-25

## 2023-07-25 PROCEDURE — 99214 OFFICE O/P EST MOD 30 MIN: CPT | Performed by: FAMILY MEDICINE

## 2023-07-25 PROCEDURE — G0439 PPPS, SUBSEQ VISIT: HCPCS | Performed by: FAMILY MEDICINE

## 2023-07-25 RX ORDER — AMMONIUM LACTATE 12 G/100G
CREAM TOPICAL
COMMUNITY
Start: 2023-06-14

## 2023-07-25 NOTE — PROGRESS NOTES
Assessment and Plan:     Problem List Items Addressed This Visit        Digestive    Gastroesophageal reflux disease without esophagitis     Stable on meds continue meds            Respiratory    ILD (interstitial lung disease) ([address])     Stable Patient to use albuterol and singulair as directed CT of chest is ordered I recommend yearly flu shot and she needs the prevnar 20 She declines the prevnar 20 at this time         Sjogren's syndrome with lung involvement ([address])     Continue to see the rheumatology team             Musculoskeletal and Integument    Primary osteoarthritis of both knees     Continue as needed medication            Other    Class 2 severe obesity due to excess calories with serious comorbidity and body mass index (BMI) of 36.0 to 36.9 in [facility])     Discussed healthy diet with patient She should try to exercise 20-30 minutes daily I will see her in 6 months          Vitamin D deficiency     Check labs recommend dexa Patient declines         Relevant Orders    Vitamin D 25 hydroxy    Medicare annual wellness visit, subsequent     Patient declines dexa and also prevnar 20 Patient needs flu shot in fall She will get her mammogram in 12/2023 Patient also to get us a copy of living will         Other Visit Diagnoses     Encounter for immunization    -  Primary    Relevant Orders    Pneumococcal Conjugate Vaccine 20-valent (PCV20)    Encounter for screening mammogram for malignant neoplasm of breast        Relevant Orders    Mammo screening bilateral w 3d & cad    Screening for cholesterol level        Relevant Orders    Lipid panel        BMI Counseling: Body mass index is 36.87 kg/m². The BMI is above normal. Nutrition recommendations include decreasing portion sizes and moderation in carbohydrate intake. Rationale for BMI follow-up plan is due to patient being overweight or obese. Depression Screening and Follow-up Plan: Patient was screened for depression during today's encounter.  They screened negative with a PHQ-2 score of 0. Preventive health issues were discussed with patient, and age appropriate screening tests were ordered as noted in patient's After Visit Summary. Personalized health advice and appropriate referrals for health education or preventive services given if needed, as noted in patient's After Visit Summary. History of Present Illness:     Patient presents for a Medicare Wellness Visit    Patient is here for medicare wellness and follow up of interstitial lung disease sjogren's DJD of both knees GERD vitamin D deficiency  and also her weight She is seeing the rheumatologist She had CT of chest scheduled She is not having any breathing isseus and no reflux Her last lipids and vitamin D were over one year ago She has seen ortho for her knees and feels they are stable She needs mammogram in 12/2023 She needs the prevnar 20 and is declining it at this time She has living julián and will get us a copy She has no complaints     Patient Care Team:  Aureliano Faye DO as PCP - MD Shawn Barfield MD Garner Pence, MD as Endoscopist     Review of Systems:     Review of Systems   Constitutional: Negative for fatigue, fever and unexpected weight change. HENT: Negative for congestion, sinus pain and trouble swallowing. Eyes: Negative for discharge and visual disturbance. Respiratory: Negative for cough, chest tightness, shortness of breath and wheezing. Cardiovascular: Negative for chest pain, palpitations and leg swelling. Gastrointestinal: Negative for abdominal pain, blood in stool, constipation, diarrhea, nausea and vomiting. Genitourinary: Negative for difficulty urinating, dysuria, frequency and hematuria. Musculoskeletal: Negative for arthralgias, gait problem and joint swelling. Skin: Negative for rash and wound.    Allergic/Immunologic: Negative for environmental allergies and food allergies. Neurological: Negative for dizziness, syncope, weakness, numbness and headaches. Hematological: Negative for adenopathy. Does not bruise/bleed easily. Psychiatric/Behavioral: Negative for confusion, decreased concentration and sleep disturbance. The patient is not nervous/anxious. Problem List:     Patient Active Problem List   Diagnosis   • Abnormal PFTs   • Class 2 severe obesity due to excess calories with serious comorbidity and body mass index (BMI) of 36.0 to 36.9 in adult ([address])   • MALINI positive   • ILD (interstitial lung disease) ([address])   • Primary osteoarthritis of both knees   • Sjogren's syndrome with lung involvement ([address])   • Anti-cardiolipin antibody positive   • Seasonal allergic rhinitis due to pollen   • Allergic conjunctivitis of both eyes   • Vitamin D deficiency   • Generalized pain   • Gastroesophageal reflux disease without esophagitis   • Medicare annual wellness visit, subsequent      Past Medical and Surgical History:     Past Medical History:   Diagnosis Date   • Allergic    • Allergic rhinitis     [id number]  RESOLVED   • Candidiasis of breast     [id number] RESOLVED   • Diverticulosis    • Hemorrhoids    • Lung nodule    • Pneumonia    • PONV (postoperative nausea and vomiting)    • Psoriatic arthritis ([address])    • Sjogren's syndrome ([address])     Followed by rheumatology - Dr. Yisel Downs     Past Surgical History:   Procedure Laterality Date   • BREAST LUMPECTOMY Left    • COLONOSCOPY     •  [address] INCL FLUOR GDNCE DX W/CELL WASHG [facility] N/A 2/8/2017    Procedure: BRONCHOSCOPY FLEXIBLE;  Surgeon: Eunice Avalos MD;  Location: [facility]; Service: Pulmonary   • [place] COLONOSCOPY FLX DX W/COLLJ SPEC WHEN PFRMD N/A 8/16/2016    Procedure: COLONOSCOPY;  Surgeon: Subhash Mcmullen MD;  Location: [facility];   Service: General   • UMBILICAL HERNIA REPAIR        Family History:     Family History   Problem Relation Age of Onset   • Hypertension Mother    • Hypothyroidism Mother    • Breast cancer Sister 43   • Arthritis Family       Social History:     Social History     Socioeconomic History   • Marital status: /Civil Union     Spouse name: None   • Number of children: None   • Years of education: None   • Highest education level: None   Occupational History   • None   Tobacco Use   • Smoking status: Former     Packs/day: 0.50     Years: 15.00     Total pack years: 7.50     Types: Cigarettes     Quit date:      Years since quittin.5   • Smokeless tobacco: Never   • Tobacco comments:     DENIED HISTORY OF TOBACCO USE   Vaping Use   • Vaping Use: Never used   Substance and Sexual Activity   • Alcohol use: No   • Drug use: No   • Sexual activity: None   Other Topics Concern   • None   Social History Narrative    CAFFEINE USE DOES NOT CONSUME    USES SAFETY EQUIPMENT - SEAT BELTS     Social Determinants of Health     Financial Resource Strain: Low Risk  (2023)    Overall Financial Resource Strain (CARDIA)    • Difficulty of Paying Living Expenses: Not hard at all   Food Insecurity: Not on file   Transportation Needs: No Transportation Needs (2023)    PRAPARE - Transportation    • Lack of Transportation (Medical): No    • Lack of Transportation (Non-Medical):  No   Physical Activity: Not on file   Stress: Not on file   Social Connections: Not on file   Intimate Partner Violence: Not on file   Housing Stability: Not on file      Medications and Allergies:     Current Outpatient Medications   Medication Sig Dispense Refill   • albuterol (PROVENTIL HFA,VENTOLIN HFA) 90 mcg/act inhaler Inhale 2 puffs 4 (four) times a day as needed     • albuterol (Ventolin HFA) 90 mcg/act inhaler Inhale 2 puffs every 6 (six) hours as needed for wheezing 18 g 5   • Biotin 1 MG CAPS Take by mouth     • desloratadine (CLARINEX) 5 MG tablet Take 1 tablet (5 mg total) by mouth daily 30 tablet 5   • ibuprofen (MOTRIN) 800 mg tablet TAKE 1 TABLET (800 MG) BY MOUTH 2 TIMES A DAY AS NEEDED FOR MODERATE PAIN (PAIN SCORE 4-6). • ketoconazole (NIZORAL) 2 % cream APPLY TWICE DAILY TO FOOT FOR 6 WEEKS     • montelukast (SINGULAIR) 10 mg tablet TAKE 1 TABLET BY MOUTH EVERYDAY AT BEDTIME 90 tablet 0   • Multiple Minerals-Vitamins (CALCIUM-MAGNESIUM-ZINC-D3) TABS Take by mouth daily. • mycophenolate (CELLCEPT) 500 mg tablet TAKE THREE TABLETS BY MOUTH EVERY DAY IN THE MORNING AND TAKE TWO TABLETS BY MOUTH EVERY DAY AT BEDTIME     • omega-3-acid ethyl esters (LOVAZA) 1 g capsule Take 2 g by mouth 2 (two) times a day     • omeprazole (PriLOSEC) 20 mg delayed release capsule TAKE 1 CAPSULE BY MOUTH EVERY DAY 90 capsule 1   • Specialty Vitamins Products (MAGNESIUM, AMINO ACID CHELATE,) 133 MG tablet Take 133 mg by mouth     • triamcinolone (KENALOG) 0.1 % oral topical paste Apply 1 application topically 2 (two) times a day 5 g 1   • ammonium lactate (LAC-HYDRIN) 12 % cream APPLY TO FEET TWICE DAILY FOR DRY SKIN     • Multiple Vitamins-Minerals (MULTIVITAMIN WITH MINERALS) tablet Take 1 tablet by mouth daily. (Patient not taking: Reported on 7/25/2023)       No current facility-administered medications for this visit.      Allergies   Allergen Reactions   • Egg Yolk - Food Allergy Diarrhea     Egg Extract   • Aspirin GI Intolerance     nausea   • Sulfa Antibiotics Rash      Immunizations:     Immunization History   Administered Date(s) Administered   • Hep B, adult 03/15/2002, 08/01/2003   • INFLUENZA 10/30/2018, 10/28/2019   • Influenza Split High Dose Preservative Free IM 10/30/2018   • Influenza, high dose seasonal 0.7 mL 10/20/2020   • Influenza, seasonal, injectable 1955   • MMR 03/15/2002   • Pneumococcal Conjugate 13-Valent 10/30/2018   • Pneumococcal Polysaccharide PPV23 05/11/2015   • Tdap 03/30/2016   • Zoster 03/30/2016      Health Maintenance:         Topic Date Due   • Breast Cancer Screening: Mammogram  12/05/2023   • Colorectal Cancer Screening  08/16/2026   • Hepatitis C Screening  Completed Topic Date Due   • COVID-19 Vaccine (1) Never done   • Pneumococcal Vaccine: 65+ Years (3 - PPSV23 if available, else PCV20) 05/11/2020   • Influenza Vaccine (1) 09/01/2023      Medicare Screening Tests and Risk Assessments:     Barbara Helms is here for her Subsequent Wellness visit. Health Risk Assessment:   Patient rates overall health as good. Patient feels that their physical health rating is same. Patient is very satisfied with their life. Eyesight was rated as same. Hearing was rated as same. Patient feels that their emotional and mental health rating is same. Patients states they are never, rarely angry. Patient states they are sometimes unusually tired/fatigued. Pain experienced in the last 7 days has been some. Patient's pain rating has been 3/10. Patient states that she has experienced no weight loss or gain in last 6 months. Depression Screening:   PHQ-2 Score: 0      Fall Risk Screening: In the past year, patient has experienced: no history of falling in past year      Urinary Incontinence Screening:   Patient has not leaked urine accidently in the last six months. Home Safety:  Patient has trouble with stairs inside or outside of their home. Patient has working smoke alarms and has working carbon monoxide detector. Home safety hazards include: none. Has DJD    Nutrition:   Current diet is Regular. Medications:   Patient is currently taking over-the-counter supplements. OTC medications include: see medication list. Patient is able to manage medications. Activities of Daily Living (ADLs)/Instrumental Activities of Daily Living (IADLs):   Walk and transfer into and out of bed and chair?: Yes  Dress and groom yourself?: Yes    Bathe or shower yourself?: Yes    Feed yourself?  Yes  Do your laundry/housekeeping?: Yes  Manage your money, pay your bills and track your expenses?: Yes  Make your own meals?: Yes    Do your own shopping?: Yes    Previous Hospitalizations:   Any hospitalizations or ED visits within the last 12 months?: No      Advance Care Planning:   Living will: Yes    Durable POA for healthcare: Yes    Advanced directive: Yes    Provider agrees with end of life decisions: Yes      Cognitive Screening:   Provider or family/friend/caregiver concerned regarding cognition?: No    PREVENTIVE SCREENINGS      Cardiovascular Screening:    General: Screening Current      Diabetes Screening:     General: Screening Current      Colorectal Cancer Screening:     General: Screening Current      Breast Cancer Screening:     General: Screening Current      Cervical Cancer Screening:    General: Screening Not Indicated      Lung Cancer Screening:     General: Screening Not Indicated      Hepatitis C Screening:    General: Screening Current    Screening, Brief Intervention, and Referral to Treatment (SBIRT)    Screening  Typical number of drinks in a day: 0  Typical number of drinks in a week: 0  Interpretation: Low risk drinking behavior. Single Item Drug Screening:  How often have you used an illegal drug (including marijuana) or a prescription medication for non-medical reasons in the past year? never    Single Item Drug Screen Score: 0  Interpretation: Negative screen for possible drug use disorder    No results found. Physical Exam:     /80   Ht 5' 4" (1.626 m)   Wt 97.4 kg (214 lb 12.8 oz)   BMI 36.87 kg/m²     Physical Exam  Vitals and nursing note reviewed. Constitutional:       Appearance: She is well-developed. She is obese. HENT:      Head: Normocephalic and atraumatic. Right Ear: External ear normal.      Left Ear: External ear normal.      Nose: Nose normal.      Mouth/Throat:      Pharynx: No oropharyngeal exudate. Eyes:      Extraocular Movements: Extraocular movements intact. Conjunctiva/sclera: Conjunctivae normal.      Pupils: Pupils are equal, round, and reactive to light. Neck:      Thyroid: No thyromegaly. Trachea: No tracheal deviation. Cardiovascular:      Rate and Rhythm: Normal rate and regular rhythm. Heart sounds: Normal heart sounds. Pulmonary:      Effort: Pulmonary effort is normal. No respiratory distress. Breath sounds: Normal breath sounds. No wheezing or rales. Abdominal:      General: Bowel sounds are normal.      Palpations: Abdomen is soft. Musculoskeletal:         General: Normal range of motion. Cervical back: Normal range of motion and neck supple. Lymphadenopathy:      Cervical: No cervical adenopathy. Skin:     General: Skin is warm. Findings: No rash. Neurological:      General: No focal deficit present. Mental Status: She is alert and oriented to person, place, and time. Cranial Nerves: No cranial nerve deficit. Motor: No abnormal muscle tone. Psychiatric:         Mood and Affect: Mood normal.         Behavior: Behavior normal.         Thought Content:  Thought content normal.         Judgment: Judgment normal.          Ashwin Fish DO

## 2023-07-25 NOTE — PATIENT INSTRUCTIONS
Medicare Preventive Visit Patient Instructions  Thank you for completing your Welcome to Medicare Visit or Medicare Annual Wellness Visit today. Your next wellness visit will be due in one year (7/25/2024). The screening/preventive services that you may require over the next 5-10 years are detailed below. Some tests may not apply to you based off risk factors and/or age. Screening tests ordered at today's visit but not completed yet may show as past due. Also, please note that scanned in results may not display below. Preventive Screenings:  Service Recommendations Previous Testing/Comments   Colorectal Cancer Screening  * Colonoscopy    * Fecal Occult Blood Test (FOBT)/Fecal Immunochemical Test (FIT)  * Fecal DNA/Cologuard Test  * Flexible Sigmoidoscopy Age: 43-73 years old   Colonoscopy: every 10 years (may be performed more frequently if at higher risk)  OR  FOBT/FIT: every 1 year  OR  Cologuard: every 3 years  OR  Sigmoidoscopy: every 5 years  Screening may be recommended earlier than age 39 if at higher risk for colorectal cancer. Also, an individualized decision between you and your healthcare provider will decide whether screening between the ages of 77-80 would be appropriate. Colonoscopy: 08/16/2016  FOBT/FIT: Not on file  Cologuard: Not on file  Sigmoidoscopy: Not on file    Screening Current     Breast Cancer Screening Age: 36 years old  Frequency: every 1-2 years  Not required if history of left and right mastectomy Mammogram: 12/05/2022    Screening Current   Cervical Cancer Screening Between the ages of 21-29, pap smear recommended once every 3 years. Between the ages of 32-69, can perform pap smear with HPV co-testing every 5 years.    Recommendations may differ for women with a history of total hysterectomy, cervical cancer, or abnormal pap smears in past. Pap Smear: Not on file    Screening Not Indicated   Hepatitis C Screening Once for adults born between 1945 and 1965  More frequently in patients at high risk for Hepatitis C Hep C Antibody: 02/26/2019    Screening Current   Diabetes Screening 1-2 times per year if you're at risk for diabetes or have pre-diabetes Fasting glucose: 89 mg/dL (6/19/2023)  A1C: No results in last 5 years (No results in last 5 years)  Screening Current   Cholesterol Screening Once every 5 years if you don't have a lipid disorder. May order more often based on risk factors. Lipid panel: 01/12/2021    Screening Current     Other Preventive Screenings Covered by Medicare:  1. Abdominal Aortic Aneurysm (AAA) Screening: covered once if your at risk. You're considered to be at risk if you have a family history of AAA. 2. Lung Cancer Screening: covers low dose CT scan once per year if you meet all of the following conditions: (1) Age 48-67; (2) No signs or symptoms of lung cancer; (3) Current smoker or have quit smoking within the last 15 years; (4) You have a tobacco smoking history of at least 20 pack years (packs per day multiplied by number of years you smoked); (5) You get a written order from a healthcare provider. 3. Glaucoma Screening: covered annually if you're considered high risk: (1) You have diabetes OR (2) Family history of glaucoma OR (3)  aged 48 and older OR (3)  American aged 72 and older  3. Osteoporosis Screening: covered every 2 years if you meet one of the following conditions: (1) You're estrogen deficient and at risk for osteoporosis based off medical history and other findings; (2) Have a vertebral abnormality; (3) On glucocorticoid therapy for more than 3 months; (4) Have primary hyperparathyroidism; (5) On osteoporosis medications and need to assess response to drug therapy. · Last bone density test (DXA Scan): 02/18/2019.  5. HIV Screening: covered annually if you're between the age of 15-65. Also covered annually if you are younger than 13 and older than 72 with risk factors for HIV infection.  For pregnant patients, it is covered up to 3 times per pregnancy. Immunizations:  Immunization Recommendations   Influenza Vaccine Annual influenza vaccination during flu season is recommended for all persons aged >= 6 months who do not have contraindications   Pneumococcal Vaccine   * Pneumococcal conjugate vaccine = PCV13 (Prevnar 13), PCV15 (Vaxneuvance), PCV20 (Prevnar 20)  * Pneumococcal polysaccharide vaccine = PPSV23 (Pneumovax) Adults 20-63 years old: 1-3 doses may be recommended based on certain risk factors  Adults 72 years old: 1-2 doses may be recommended based off what pneumonia vaccine you previously received   Hepatitis B Vaccine 3 dose series if at intermediate or high risk (ex: diabetes, end stage renal disease, liver disease)   Tetanus (Td) Vaccine - COST NOT COVERED BY MEDICARE PART B Following completion of primary series, a booster dose should be given every 10 years to maintain immunity against tetanus. Td may also be given as tetanus wound prophylaxis. Tdap Vaccine - COST NOT COVERED BY MEDICARE PART B Recommended at least once for all adults. For pregnant patients, recommended with each pregnancy. Shingles Vaccine (Shingrix) - COST NOT COVERED BY MEDICARE PART B  2 shot series recommended in those aged 48 and above     Health Maintenance Due:      Topic Date Due   • Breast Cancer Screening: Mammogram  12/05/2023   • Colorectal Cancer Screening  08/16/2026   • Hepatitis C Screening  Completed     Immunizations Due:      Topic Date Due   • COVID-19 Vaccine (1) Never done   • Pneumococcal Vaccine: 65+ Years (3 - PPSV23 if available, else PCV20) 05/11/2020   • Influenza Vaccine (1) 09/01/2023     Advance Directives   What are advance directives? Advance directives are legal documents that state your wishes and plans for medical care. These plans are made ahead of time in case you lose your ability to make decisions for yourself.  Advance directives can apply to any medical decision, such as the treatments you want, and if you want to donate organs. What are the types of advance directives? There are many types of advance directives, and each state has rules about how to use them. You may choose a combination of any of the following:  · Living will: This is a written record of the treatment you want. You can also choose which treatments you do not want, which to limit, and which to stop at a certain time. This includes surgery, medicine, IV fluid, and tube feedings. · Durable power of  for healthcare [facility]): This is a written record that states who you want to make healthcare choices for you when you are unable to make them for yourself. This person, called a proxy, is usually a family member or a friend. You may choose more than 1 proxy. · Do not resuscitate (DNR) order:  A DNR order is used in case your heart stops beating or you stop breathing. It is a request not to have certain forms of treatment, such as CPR. A DNR order may be included in other types of advance directives. · Medical directive: This covers the care that you want if you are in a coma, near death, or unable to make decisions for yourself. You can list the treatments you want for each condition. Treatment may include pain medicine, surgery, blood transfusions, dialysis, IV or tube feedings, and a ventilator (breathing machine). · Values history: This document has questions about your views, beliefs, and how you feel and think about life. This information can help others choose the care that you would choose. Why are advance directives important? An advance directive helps you control your care. Although spoken wishes may be used, it is better to have your wishes written down. Spoken wishes can be misunderstood, or not followed. Treatments may be given even if you do not want them. An advance directive may make it easier for your family to make difficult choices about your care.    Weight Management   Why it is important to manage your weight:  Being overweight increases your risk of health conditions such as heart disease, high blood pressure, type 2 diabetes, and certain types of cancer. It can also increase your risk for osteoarthritis, sleep apnea, and other respiratory problems. Aim for a slow, steady weight loss. Even a small amount of weight loss can lower your risk of health problems. How to lose weight safely:  A safe and healthy way to lose weight is to eat fewer calories and get regular exercise. You can lose up about 1 pound a week by decreasing the number of calories you eat by 500 calories each day. Healthy meal plan for weight management:  A healthy meal plan includes a variety of foods, contains fewer calories, and helps you stay healthy. A healthy meal plan includes the following:  · Eat whole-grain foods more often. A healthy meal plan should contain fiber. Fiber is the part of grains, fruits, and vegetables that is not broken down by your body. Whole-grain foods are healthy and provide extra fiber in your diet. Some examples of whole-grain foods are whole-wheat breads and pastas, oatmeal, brown rice, and bulgur. · Eat a variety of vegetables every day. Include dark, leafy greens such as spinach, kale, merritt greens, and mustard greens. Eat yellow and orange vegetables such as carrots, sweet potatoes, and winter squash. · Eat a variety of fruits every day. Choose fresh or canned fruit (canned in its own juice or light syrup) instead of juice. Fruit juice has very little or no fiber. · Eat low-fat dairy foods. Drink fat-free (skim) milk or 1% milk. Eat fat-free yogurt and low-fat cottage cheese. Try low-fat cheeses such as mozzarella and other reduced-fat cheeses. · Choose meat and other protein foods that are low in fat. Choose beans or other legumes such as split peas or lentils. Choose fish, skinless poultry (chicken or turkey), or lean cuts of red meat (beef or pork).  Before you cook meat or poultry, cut off any visible fat. · Use less fat and oil. Try baking foods instead of frying them. Add less fat, such as margarine, sour cream, regular salad dressing and mayonnaise to foods. Eat fewer high-fat foods. Some examples of high-fat foods include french fries, doughnuts, ice cream, and cakes. · Eat fewer sweets. Limit foods and drinks that are high in sugar. This includes candy, cookies, regular soda, and sweetened drinks. Exercise:  Exercise at least 30 minutes per day on most days of the week. Some examples of exercise include walking, biking, dancing, and swimming. You can also fit in more physical activity by taking the stairs instead of the elevator or parking farther away from stores. Ask your healthcare provider about the best exercise plan for you. © Copyright [employer] 2018 Information is for End User's use only and may not be sold, redistributed or otherwise used for commercial purposes.  All illustrations and images included in CareNotes® are the copyrighted property of [employer]M., Inc. or  [employer]

## 2023-07-26 ENCOUNTER — APPOINTMENT (OUTPATIENT)
Dept: LAB | Facility: HOSPITAL | Age: 68
End: 2023-07-26
Payer: COMMERCIAL

## 2023-07-26 DIAGNOSIS — Z13.220 SCREENING FOR CHOLESTEROL LEVEL: ICD-10-CM

## 2023-07-26 DIAGNOSIS — E55.9 VITAMIN D DEFICIENCY: ICD-10-CM

## 2023-07-26 LAB
25(OH)D3 SERPL-MCNC: 77 NG/ML (ref 30–100)
CHOLEST SERPL-MCNC: 157 MG/DL
HDLC SERPL-MCNC: 46 MG/DL
LDLC SERPL CALC-MCNC: 99 MG/DL (ref 0–100)
NONHDLC SERPL-MCNC: 111 MG/DL
TRIGL SERPL-MCNC: 60 MG/DL

## 2023-07-26 PROCEDURE — 82306 VITAMIN D 25 HYDROXY: CPT

## 2023-07-26 PROCEDURE — 36415 COLL VENOUS BLD VENIPUNCTURE: CPT

## 2023-07-26 PROCEDURE — 80061 LIPID PANEL: CPT

## 2023-08-13 DIAGNOSIS — J31.0 CHRONIC RHINITIS: ICD-10-CM

## 2023-08-14 RX ORDER — MONTELUKAST SODIUM 10 MG/1
TABLET ORAL
Qty: 90 TABLET | Refills: 0 | Status: SHIPPED | OUTPATIENT
Start: 2023-08-14

## 2023-09-23 PROBLEM — Z00.00 MEDICARE ANNUAL WELLNESS VISIT, SUBSEQUENT: Status: RESOLVED | Noted: 2023-07-25 | Resolved: 2023-09-23

## 2023-11-11 DIAGNOSIS — J31.0 CHRONIC RHINITIS: ICD-10-CM

## 2023-11-13 RX ORDER — MONTELUKAST SODIUM 10 MG/1
TABLET ORAL
Qty: 90 TABLET | Refills: 0 | Status: SHIPPED | OUTPATIENT
Start: 2023-11-13

## 2023-11-18 DIAGNOSIS — J18.9 PNEUMONIA OF LEFT LOWER LOBE DUE TO INFECTIOUS ORGANISM: ICD-10-CM

## 2023-11-20 RX ORDER — ALBUTEROL SULFATE 90 UG/1
AEROSOL, METERED RESPIRATORY (INHALATION)
Qty: 18 G | Refills: 0 | Status: SHIPPED | OUTPATIENT
Start: 2023-11-20

## 2023-11-28 ENCOUNTER — OFFICE VISIT (OUTPATIENT)
Dept: FAMILY MEDICINE CLINIC | Facility: CLINIC | Age: 68
End: 2023-11-28
Payer: COMMERCIAL

## 2023-11-28 VITALS
SYSTOLIC BLOOD PRESSURE: 118 MMHG | HEART RATE: 85 BPM | BODY MASS INDEX: 36.54 KG/M2 | WEIGHT: 214 LBS | TEMPERATURE: 97.9 F | DIASTOLIC BLOOD PRESSURE: 78 MMHG | HEIGHT: 64 IN | OXYGEN SATURATION: 98 %

## 2023-11-28 DIAGNOSIS — J01.40 ACUTE NON-RECURRENT PANSINUSITIS: Primary | ICD-10-CM

## 2023-11-28 PROCEDURE — 99213 OFFICE O/P EST LOW 20 MIN: CPT | Performed by: FAMILY MEDICINE

## 2023-11-28 RX ORDER — AMOXICILLIN 875 MG/1
875 TABLET, COATED ORAL 2 TIMES DAILY
Qty: 20 TABLET | Refills: 0 | Status: SHIPPED | OUTPATIENT
Start: 2023-11-28 | End: 2023-12-08

## 2023-11-28 NOTE — PROGRESS NOTES
Chief Complaint   Patient presents with    Sore Throat    Cough    Nasal Congestion      X 4-5 days      Name: Guilherme Omer      : 1955      MRN: [id number]  Encounter Provider: Sherley Mitchell DO  Encounter Date: 2023   Encounter department: [facility] PRIMARY [facility]ARE    Assessment & Plan     1. Acute non-recurrent pansinusitis  Assessment & Plan:  Patient to take the amoxil as directed Patient to take decongestant OTC     Orders:  -     amoxicillin (AMOXIL) 875 mg tablet; Take 1 tablet (875 mg total) by mouth 2 (two) times a day for 10 days           Subjective      Patient is here for sinus pain and congestion for last 7 days Patient has discolored mucous and sore throat She is taking OTC meds with no help Patient has been using the albuterol on and off since she has been ill No fever She did 2 tests for covid and both negative     URI   This is a new problem. The current episode started in the past 7 days. The problem has been gradually worsening. There has been no fever. Associated symptoms include congestion, coughing, headaches, rhinorrhea, sinus pain, sneezing and a sore throat. Pertinent negatives include no abdominal pain, chest pain, diarrhea, dysuria, ear pain, joint pain, joint swelling, nausea, neck pain, plugged ear sensation, rash, swollen glands, vomiting or wheezing. She has tried acetaminophen and antihistamine for the symptoms. The treatment provided mild relief. Review of Systems   Constitutional:  Negative for activity change, appetite change, fatigue and fever. HENT:  Positive for congestion, rhinorrhea, sinus pain, sneezing and sore throat. Negative for ear pain. Respiratory:  Positive for cough. Negative for wheezing. Cardiovascular:  Negative for chest pain. Gastrointestinal:  Negative for abdominal pain, diarrhea, nausea and vomiting. Genitourinary:  Negative for dysuria. Musculoskeletal:  Negative for joint pain and neck pain.    Skin:  Negative for rash. Neurological:  Positive for headaches. Current Outpatient Medications on File Prior to Visit   Medication Sig    albuterol (PROVENTIL HFA,VENTOLIN HFA) 90 mcg/act inhaler INHALE 2 PUFFS EVERY 6 HOURS AS NEEDED FOR WHEEZING    ammonium lactate (LAC-HYDRIN) 12 % cream APPLY TO FEET TWICE DAILY FOR DRY SKIN    Biotin 1 MG CAPS Take by mouth    desloratadine (CLARINEX) 5 MG tablet Take 1 tablet (5 mg total) by mouth daily    ibuprofen (MOTRIN) 800 mg tablet TAKE 1 TABLET (800 MG) BY MOUTH 2 TIMES A DAY AS NEEDED FOR MODERATE PAIN (PAIN SCORE 4-6). ketoconazole (NIZORAL) 2 % cream APPLY TWICE DAILY TO FOOT FOR 6 WEEKS    montelukast (SINGULAIR) 10 mg tablet TAKE 1 TABLET BY MOUTH EVERYDAY AT BEDTIME    Multiple Minerals-Vitamins (CALCIUM-MAGNESIUM-ZINC-D3) TABS Take by mouth daily. Multiple Vitamins-Minerals (MULTIVITAMIN WITH MINERALS) tablet Take 1 tablet by mouth daily    mycophenolate (CELLCEPT) 500 mg tablet TAKE THREE TABLETS BY MOUTH EVERY DAY IN THE MORNING AND TAKE TWO TABLETS BY MOUTH EVERY DAY AT BEDTIME    omega-3-acid ethyl esters (LOVAZA) 1 g capsule Take 2 g by mouth 2 (two) times a day    omeprazole (PriLOSEC) 20 mg delayed release capsule TAKE 1 CAPSULE BY MOUTH EVERY DAY    Specialty Vitamins Products (MAGNESIUM, AMINO ACID CHELATE,) 133 MG tablet Take 133 mg by mouth    triamcinolone (KENALOG) 0.1 % oral topical paste Apply 1 application topically 2 (two) times a day    [DISCONTINUED] albuterol (PROVENTIL HFA,VENTOLIN HFA) 90 mcg/act inhaler Inhale 2 puffs 4 (four) times a day as needed (Patient not taking: Reported on 11/28/2023)       Objective     /78   Pulse 85   Temp 97.9 °F (36.6 °C)   Ht 5' 4" (1.626 m)   Wt 97.1 kg (214 lb)   SpO2 98%   BMI 36.73 kg/m²     Physical Exam  Vitals and nursing note reviewed. Constitutional:       Appearance: Normal appearance. HENT:      Head: Normocephalic.       Comments: Maxillary and frontal sinus pain     Nose: Rhinorrhea present. Mouth/Throat:      Mouth: Mucous membranes are moist.      Pharynx: Posterior oropharyngeal erythema present. Comments: PND  Eyes:      Extraocular Movements: Extraocular movements intact. Conjunctiva/sclera: Conjunctivae normal.      Pupils: Pupils are equal, round, and reactive to light. Cardiovascular:      Rate and Rhythm: Normal rate and regular rhythm. Heart sounds: Normal heart sounds. Pulmonary:      Effort: Pulmonary effort is normal.      Breath sounds: Normal breath sounds. Musculoskeletal:      Cervical back: Normal range of motion and neck supple. Neurological:      General: No focal deficit present. Mental Status: She is alert and oriented to person, place, and time.    Psychiatric:         Mood and Affect: Mood normal.         Behavior: Behavior normal.       Quentin Romberg, DO

## 2023-11-28 NOTE — PATIENT INSTRUCTIONS
Sinusitis   WHAT YOU NEED TO KNOW:   What is sinusitis? Sinusitis is inflammation or infection of your sinuses. Sinusitis is most often caused by a virus. Acute sinusitis may last up to 12 weeks. Chronic sinusitis lasts longer than 12 weeks. Recurrent sinusitis means you have 4 or more infections in 1 year. What increases my risk for sinusitis? Medical conditions, such as an upper respiratory infection, allergies, asthma, or cystic fibrosis    Dental infections or procedures, such as gum infections, tooth decay, or a root canal    Smoking or exposure to secondhand smoke    Abnormal sinus structure, such as nasal growths, swollen tonsils, or a deviated septum    A weak immune system, from diseases such as diabetes or HIV    What are the signs and symptoms of sinusitis? Fever    Pain, pressure, redness, or swelling around the forehead, cheeks, or eyes    Thick yellow or green discharge from your nose    Tenderness when you touch your face over your sinuses    Dry cough that happens mostly at night or when you lie down    Headache and face pain that is worse when you lean forward    Tooth pain, or pain when you chew    How is sinusitis diagnosed? Your healthcare provider will examine you and ask about your symptoms. He or she may check inside your nose using a nasal speculum. You may need any of the following tests:  A sample of mucus from your nose  may show what germ is causing your infection. A CT or MRI of your head  may show the mucous lining of your sinuses. You may be given contrast liquid to help your sinuses show up better in the pictures. Tell your healthcare provider if you have ever had an allergic reaction to contrast liquid. Do not enter the MRI room with anything metal. Metal can cause serious injury. Tell your healthcare provider if you have any metal in or on your body. How is sinusitis treated? Your symptoms may go away on their own.  Your healthcare provider may recommend watchful waiting for up to 10 days before starting antibiotics. You may need any of the following:  Acetaminophen  decreases pain and fever. It is available without a doctor's order. Ask how much to take and how often to take it. Follow directions. Read the labels of all other medicines you are using to see if they also contain acetaminophen, or ask your doctor or pharmacist. Acetaminophen can cause liver damage if not taken correctly. NSAIDs , such as ibuprofen, help decrease swelling, pain, and fever. This medicine is available with or without a doctor's order. NSAIDs can cause stomach bleeding or kidney problems in certain people. If you take blood thinner medicine, always ask your healthcare provider if NSAIDs are safe for you. Always read the medicine label and follow directions. Nasal steroid sprays  may help decrease inflammation in your nose and sinuses. Decongestants  help reduce swelling and drain mucus in the nose and sinuses. They may help you breathe easier. Antihistamines  help dry mucus in the nose and relieve sneezing. Antibiotics  help treat or prevent a bacterial infection. How can I manage my symptoms? Rinse your sinuses as directed. Use a sinus rinse device to rinse your nasal passages with a saline (salt water) solution or distilled water. Do not use tap water. This will help thin the mucus in your nose and rinse away pollen and dirt. It will also help reduce swelling so you can breathe normally. Use a humidifier  to increase air moisture in your home. This may make it easier for you to breathe and help decrease your cough. Sleep with your head elevated. Place an extra pillow under your head before you go to sleep to help your sinuses drain. Drink liquids as directed. Ask your healthcare provider how much liquid to drink each day and which liquids are best for you. Liquids will thin the mucus in your nose and help it drain.  Avoid drinks that contain alcohol or caffeine. Do not smoke, and avoid secondhand smoke. Nicotine and other chemicals in cigarettes and cigars can make your symptoms worse. Ask your healthcare provider for information if you currently smoke and need help to quit. E-cigarettes or smokeless tobacco still contain nicotine. Talk to your healthcare provider before you use these products. How can I help prevent the spread of germs? Wash your hands often with soap and water. Wash your hands after you use the bathroom, change a child's diaper, or sneeze. Wash your hands before you prepare or eat food. Stay away from people who are sick. Some germs spread easily and quickly through contact. When should I seek immediate care? You have trouble breathing or wheezing that is getting worse. You have a stiff neck, a fever, or a bad headache. You cannot open your eye. Your eyeball bulges out or you cannot move your eye. You are more sleepy than normal, or you notice changes in your ability to think, move, or talk. You have swelling of your forehead or scalp. When should I call my doctor? You have vision changes, such as double vision. Your eye and eyelid are red, swollen, and painful. Your symptoms do not improve or go away after 10 days. You have nausea and are vomiting. Your nose is bleeding. You have questions or concerns about your condition or care. CARE AGREEMENT:   You have the right to help plan your care. Learn about your health condition and how it may be treated. Discuss treatment options with your healthcare providers to decide what care you want to receive. You always have the right to refuse treatment. The above information is an  only. It is not intended as medical advice for individual conditions or treatments. Talk to your doctor, nurse or pharmacist before following any medical regimen to see if it is safe and effective for you.   © Copyright [employer] 2023 Information is for End User's use only and may not be sold, redistributed or otherwise used for commercial purposes.

## 2023-12-08 ENCOUNTER — TELEPHONE (OUTPATIENT)
Dept: ADMINISTRATIVE | Facility: OTHER | Age: 68
End: 2023-12-08

## 2023-12-08 NOTE — TELEPHONE ENCOUNTER
Upon review of the In Basket request we were able to locate, review, and update the patient chart as requested for Mammogram.    Any additional questions or concerns should be emailed to the Practice Liaisons via the appropriate education email address, please do not reply via In Basket.     Thank you  Mikel Diaz

## 2023-12-08 NOTE — TELEPHONE ENCOUNTER
----- Message from [facility] sent at 12/8/2023  9:36 AM EST -----  12/08/23 9:37 AM    Hello, our patient Priscilla Polanco has had Mammogram completed/performed. Please assist in updating the patient chart by pulling the Care Everywhere (CE) document. The date of service is 12/06/2023.      Thank you,  Ambar Bruner

## 2023-12-19 ENCOUNTER — HOSPITAL ENCOUNTER (EMERGENCY)
Facility: HOSPITAL | Age: 68
Discharge: HOME | End: 2023-12-19
Attending: EMERGENCY MEDICINE
Payer: COMMERCIAL

## 2023-12-19 ENCOUNTER — APPOINTMENT (EMERGENCY)
Dept: CT IMAGING | Facility: HOSPITAL | Age: 68
End: 2023-12-19
Payer: COMMERCIAL

## 2023-12-19 VITALS
OXYGEN SATURATION: 100 % | RESPIRATION RATE: 22 BRPM | DIASTOLIC BLOOD PRESSURE: 65 MMHG | TEMPERATURE: 98 F | WEIGHT: 212.74 LBS | BODY MASS INDEX: 36.52 KG/M2 | SYSTOLIC BLOOD PRESSURE: 121 MMHG | HEART RATE: 64 BPM

## 2023-12-19 DIAGNOSIS — R06.00 DYSPNEA: Primary | ICD-10-CM

## 2023-12-19 LAB
ALBUMIN SERPL BCP-MCNC: 4.1 G/DL (ref 3.5–5)
ALP SERPL-CCNC: 95 U/L (ref 34–104)
ALT SERPL W P-5'-P-CCNC: 12 U/L (ref 7–52)
ANION GAP SERPL CALCULATED.3IONS-SCNC: 5 MMOL/L
AST SERPL W P-5'-P-CCNC: 20 U/L (ref 13–39)
BASOPHILS # BLD AUTO: 0.01 THOUSANDS/ÂΜL (ref 0–0.1)
BASOPHILS NFR BLD AUTO: 0 % (ref 0–1)
BILIRUB SERPL-MCNC: 0.36 MG/DL (ref 0.2–1)
BNP SERPL-MCNC: 71 PG/ML (ref 0–100)
BUN SERPL-MCNC: 12 MG/DL (ref 5–25)
CALCIUM SERPL-MCNC: 9.5 MG/DL (ref 8.4–10.2)
CARDIAC TROPONIN I PNL SERPL HS: 3 NG/L
CHLORIDE SERPL-SCNC: 104 MMOL/L (ref 96–108)
CO2 SERPL-SCNC: 31 MMOL/L (ref 21–32)
CREAT SERPL-MCNC: 0.49 MG/DL (ref 0.6–1.3)
EOSINOPHIL # BLD AUTO: 0.04 THOUSAND/ÂΜL (ref 0–0.61)
EOSINOPHIL NFR BLD AUTO: 1 % (ref 0–6)
ERYTHROCYTE [DISTWIDTH] IN BLOOD BY AUTOMATED COUNT: 14.5 % (ref 11.6–15.1)
GFR SERPL CREATININE-BSD FRML MDRD: 99 ML/MIN/1.73SQ M
GLUCOSE SERPL-MCNC: 96 MG/DL (ref 65–140)
HCT VFR BLD AUTO: 34.8 % (ref 34.8–46.1)
HGB BLD-MCNC: 11.1 G/DL (ref 11.5–15.4)
IMM GRANULOCYTES # BLD AUTO: 0.01 THOUSAND/UL (ref 0–0.2)
IMM GRANULOCYTES NFR BLD AUTO: 0 % (ref 0–2)
LYMPHOCYTES # BLD AUTO: 1.14 THOUSANDS/ÂΜL (ref 0.6–4.47)
LYMPHOCYTES NFR BLD AUTO: 34 % (ref 14–44)
MCH RBC QN AUTO: 30.1 PG (ref 26.8–34.3)
MCHC RBC AUTO-ENTMCNC: 31.9 G/DL (ref 31.4–37.4)
MCV RBC AUTO: 94 FL (ref 82–98)
MONOCYTES # BLD AUTO: 0.37 THOUSAND/ÂΜL (ref 0.17–1.22)
MONOCYTES NFR BLD AUTO: 11 % (ref 4–12)
NEUTROPHILS # BLD AUTO: 1.78 THOUSANDS/ÂΜL (ref 1.85–7.62)
NEUTS SEG NFR BLD AUTO: 54 % (ref 43–75)
NRBC BLD AUTO-RTO: 0 /100 WBCS
PLATELET # BLD AUTO: 205 THOUSANDS/UL (ref 149–390)
PMV BLD AUTO: 9.1 FL (ref 8.9–12.7)
POTASSIUM SERPL-SCNC: 4.1 MMOL/L (ref 3.5–5.3)
PROT SERPL-MCNC: 7.6 G/DL (ref 6.4–8.4)
RBC # BLD AUTO: 3.69 MILLION/UL (ref 3.81–5.12)
SODIUM SERPL-SCNC: 140 MMOL/L (ref 135–147)
WBC # BLD AUTO: 3.35 THOUSAND/UL (ref 4.31–10.16)

## 2023-12-19 PROCEDURE — 36415 COLL VENOUS BLD VENIPUNCTURE: CPT | Performed by: EMERGENCY MEDICINE

## 2023-12-19 PROCEDURE — 84484 ASSAY OF TROPONIN QUANT: CPT | Performed by: EMERGENCY MEDICINE

## 2023-12-19 PROCEDURE — 85025 COMPLETE CBC W/AUTO DIFF WBC: CPT | Performed by: EMERGENCY MEDICINE

## 2023-12-19 PROCEDURE — 71275 CT ANGIOGRAPHY CHEST: CPT

## 2023-12-19 PROCEDURE — 99285 EMERGENCY DEPT VISIT HI MDM: CPT

## 2023-12-19 PROCEDURE — 80053 COMPREHEN METABOLIC PANEL: CPT | Performed by: EMERGENCY MEDICINE

## 2023-12-19 PROCEDURE — 83880 ASSAY OF NATRIURETIC PEPTIDE: CPT | Performed by: EMERGENCY MEDICINE

## 2023-12-19 PROCEDURE — 93005 ELECTROCARDIOGRAM TRACING: CPT

## 2023-12-19 PROCEDURE — 99285 EMERGENCY DEPT VISIT HI MDM: CPT | Performed by: EMERGENCY MEDICINE

## 2023-12-19 RX ADMIN — IOHEXOL 85 ML: 350 INJECTION, SOLUTION INTRAVENOUS at 18:47

## 2023-12-19 NOTE — ED PROVIDER NOTES
"Pt Name: Jessica Santamaria  MRN: [id number]  Birthdate 1955  Age/Sex: 68 y.o. female  Date of evaluation: 12/19/2023  PCP: Patricia Dos Santos DO    CHIEF COMPLAINT    Chief Complaint   Patient presents with    Shortness of Breath     Patient reports SOB, R upper back pain since last night. Recent URI symptoms lasting for several weeks. [facility] and diagnosed with \"pneumonia vs interstitial lung disease appearance\".         HPI    Jessica presents to the Emergency Department complaining of cough and SOB for a month.  She now had some pain in her upper back.  [facility] to [facility] and had an [facility] that was reportedly abnormal.  The patient and her daughter were concerned and came here for further evaluation.  She has a hx of Sjogren's diease with interstitial lung disease.  She is seen by Rheumatology and Pulmonology at [facility].  She is on daily cellcept and had been doing well.  She now seems to be having more SOB with minimal exertion. She has been testing for covid/ flu/ RSV and was negative.       HPI      Past Medical and Surgical History    Past Medical History:   Diagnosis Date    Allergic     Allergic rhinitis     [id number]  RESOLVED    Candidiasis of breast     30MAR2016 RESOLVED    Diverticulosis     Hemorrhoids     Lung nodule     Pneumonia     PONV (postoperative nausea and vomiting)     Psoriatic arthritis (HCC)     Sjogren's syndrome (HCC)     Followed by rheumatology - Dr. Rocha       Past Surgical History:   Procedure Laterality Date    BREAST LUMPECTOMY Left     COLONOSCOPY      AR BRNCHSC INCL FLUOR GDNCE DX W/CELL WASHG SPX N/A 2/8/2017    Procedure: BRONCHOSCOPY FLEXIBLE;  Surgeon: Madan Blake MD;  Location: [facility];  Service: Pulmonary    AR COLONOSCOPY FLX DX W/COLLJ SPEC WHEN PFRMD N/A 8/16/2016    Procedure: COLONOSCOPY;  Surgeon: Theresa Narvaez MD;  Location: [facility];  Service: General    UMBILICAL HERNIA REPAIR         Family History   Problem Relation Age of Onset    " Hypertension Mother     Hypothyroidism Mother     Breast cancer Sister 42    Arthritis Family        Social History     Tobacco Use    Smoking status: Former     Current packs/day: 0.00     Average packs/day: 0.5 packs/day for 15.0 years (7.5 ttl pk-yrs)     Types: Cigarettes     Start date:      Quit date:      Years since quittin.9    Smokeless tobacco: Never    Tobacco comments:     DENIED HISTORY OF TOBACCO USE   Vaping Use    Vaping status: Never Used   Substance Use Topics    Alcohol use: No    Drug use: No         .    Allergies    Allergies   Allergen Reactions    Egg Yolk - Food Allergy Diarrhea     Egg Extract    Aspirin GI Intolerance     nausea    Sulfa Antibiotics Rash       Home Medications    Prior to Admission medications    Medication Sig Start Date End Date Taking? Authorizing Provider   albuterol (PROVENTIL HFA,VENTOLIN HFA) 90 mcg/act inhaler INHALE 2 PUFFS EVERY 6 HOURS AS NEEDED FOR WHEEZING 23   Patricia Dos Santos DO   ammonium lactate (LAC-HYDRIN) 12 % cream APPLY TO FEET TWICE DAILY FOR DRY SKIN 23   Historical Provider, MD   Biotin 1 MG CAPS Take by mouth    Historical Provider, MD   desloratadine (CLARINEX) 5 MG tablet Take 1 tablet (5 mg total) by mouth daily 19   Augusto Nolen DO   ibuprofen (MOTRIN) 800 mg tablet TAKE 1 TABLET (800 MG) BY MOUTH 2 TIMES A DAY AS NEEDED FOR MODERATE PAIN (PAIN SCORE 4-6). 23   Historical Provider, MD   ketoconazole (NIZORAL) 2 % cream APPLY TWICE DAILY TO FOOT FOR 6 WEEKS 23   Historical Provider, MD   montelukast (SINGULAIR) 10 mg tablet TAKE 1 TABLET BY MOUTH EVERYDAY AT BEDTIME 23   Eboni Arnett MD   Multiple Minerals-Vitamins (CALCIUM-MAGNESIUM-ZINC-D3) TABS Take by mouth daily.    Historical Provider, MD   Multiple Vitamins-Minerals (MULTIVITAMIN WITH MINERALS) tablet Take 1 tablet by mouth daily    Historical Provider, MD   mycophenolate (CELLCEPT) 500 mg tablet TAKE THREE TABLETS BY MOUTH  EVERY DAY IN THE MORNING AND TAKE TWO TABLETS BY MOUTH EVERY DAY AT BEDTIME 1/30/21   Historical Provider, MD   omega-3-acid ethyl esters (LOVAZA) 1 g capsule Take 2 g by mouth 2 (two) times a day    Historical Provider, MD   omeprazole (PriLOSEC) 20 mg delayed release capsule TAKE 1 CAPSULE BY MOUTH EVERY DAY 7/19/23   Augusto Nolen, DO   Specialty Vitamins Products (MAGNESIUM, AMINO ACID CHELATE,) 133 MG tablet Take 133 mg by mouth    Historical Provider, MD   triamcinolone (KENALOG) 0.1 % oral topical paste Apply 1 application topically 2 (two) times a day 3/21/22   Augusto Nolen, DO           Review of Systems    Review of Systems   Constitutional:  Negative for chills and fever.   HENT:  Negative for ear pain and sore throat.    Eyes:  Negative for pain and visual disturbance.   Respiratory:  Positive for cough and shortness of breath.    Cardiovascular:  Negative for chest pain and palpitations.   Gastrointestinal:  Negative for abdominal pain and vomiting.   Genitourinary:  Negative for dysuria and hematuria.   Musculoskeletal:  Negative for arthralgias and back pain.   Skin:  Negative for color change and rash.   Neurological:  Negative for seizures and syncope.   All other systems reviewed and are negative.          Physical Exam      ED Triage Vitals [12/19/23 1641]   Temperature Pulse Respirations Blood Pressure SpO2   98 °F (36.7 °C) 73 16 143/67 100 %      Temp Source Heart Rate Source Patient Position - Orthostatic VS BP Location FiO2 (%)   Oral Monitor Sitting Right arm --      Pain Score       8               Physical Exam  Vitals and nursing note reviewed.   Constitutional:       General: She is not in acute distress.     Appearance: She is well-developed.   HENT:      Head: Normocephalic and atraumatic.   Eyes:      Conjunctiva/sclera: Conjunctivae normal.   Cardiovascular:      Rate and Rhythm: Normal rate and regular rhythm.      Heart sounds: No murmur heard.  Pulmonary:       Effort: Pulmonary effort is normal. No respiratory distress.      Breath sounds: Normal breath sounds.   Abdominal:      Palpations: Abdomen is soft.      Tenderness: There is no abdominal tenderness.   Musculoskeletal:         General: No swelling.      Cervical back: Neck supple.   Skin:     General: Skin is warm and dry.      Capillary Refill: Capillary refill takes less than 2 seconds.   Neurological:      Mental Status: She is alert.   Psychiatric:         Mood and Affect: Mood normal.         Assessment and Plan    Jessica Santamaria is a 68 y.o. female who presents with cough and SOB. Physical examination unremarkable. Differential diagnosis (not completely inclusive) includes pneumonia/ PE/ other. Plan will be to perform diagnostic testing and treat symptomatically.      MDM     Amount and/or Complexity of Data Reviewed  Clinical lab tests: ordered and reviewed  Tests in the radiology section of CPT®: ordered  Tests in the medicine section of CPT®: ordered and reviewed  Decide to obtain previous medical records or to obtain history from someone other than the patient: yes  Obtain history from someone other than the patient: yes  Review and summarize past medical records: yes  Independent visualization of images, tracings, or specimens: yes        Diagnostic Results    EKG:  normal EKG, normal sinus rhythm, unchanged from previous tracings    EKG INTERPRETATION  EKG Interpretation    Rate: 68 BPM  Rhythm: sinus  Axis: normal  Intervals: Normal, no blocks, QTc 412ms  T waves: nonspecific  ST segments: normal    Impression: normal EKG. EKG for comparison: reviewed  EKG interpreted by me.   Interpretation by Zuleyma Carrillo DO  EKG reviewed and interpreted independently.    Labs:    Results for orders placed or performed during the hospital encounter of 12/19/23   CBC and differential   Result Value Ref Range    WBC 3.35 (L) 4.31 - 10.16 Thousand/uL    RBC 3.69 (L) 3.81 - 5.12 Million/uL    Hemoglobin 11.1  "(L) 11.5 - 15.4 g/dL    Hematocrit 34.8 34.8 - 46.1 %    MCV 94 82 - 98 fL    MCH 30.1 26.8 - 34.3 pg    MCHC 31.9 31.4 - 37.4 g/dL    RDW 14.5 11.6 - 15.1 %    MPV 9.1 8.9 - 12.7 fL    Platelets 205 149 - 390 Thousands/uL    nRBC 0 /100 WBCs    Neutrophils Relative 54 43 - 75 %    Immat GRANS % 0 0 - 2 %    Lymphocytes Relative 34 14 - 44 %    Monocytes Relative 11 4 - 12 %    Eosinophils Relative 1 0 - 6 %    Basophils Relative 0 0 - 1 %    Neutrophils Absolute 1.78 (L) 1.85 - 7.62 Thousands/µL    Immature Grans Absolute 0.01 0.00 - 0.20 Thousand/uL    Lymphocytes Absolute 1.14 0.60 - 4.47 Thousands/µL    Monocytes Absolute 0.37 0.17 - 1.22 Thousand/µL    Eosinophils Absolute 0.04 0.00 - 0.61 Thousand/µL    Basophils Absolute 0.01 0.00 - 0.10 Thousands/µL   Comprehensive metabolic panel   Result Value Ref Range    Sodium 140 135 - 147 mmol/L    Potassium 4.1 3.5 - 5.3 mmol/L    Chloride 104 96 - 108 mmol/L    CO2 31 21 - 32 mmol/L    ANION GAP 5 mmol/L    BUN 12 5 - 25 mg/dL    Creatinine 0.49 (L) 0.60 - 1.30 mg/dL    Glucose 96 65 - 140 mg/dL    Calcium 9.5 8.4 - 10.2 mg/dL    AST 20 13 - 39 U/L    ALT 12 7 - 52 U/L    Alkaline Phosphatase 95 34 - 104 U/L    Total Protein 7.6 6.4 - 8.4 g/dL    Albumin 4.1 3.5 - 5.0 g/dL    Total Bilirubin 0.36 0.20 - 1.00 mg/dL    eGFR 99 ml/min/1.73sq m   HS Troponin 0hr (reflex protocol)   Result Value Ref Range    hs TnI 0hr 3 \"Refer to ACS Flowchart\"- see link ng/L   B-Type Natriuretic Peptide(BNP)   Result Value Ref Range    BNP 71 0 - 100 pg/mL       All labs reviewed and utilized in the medical decision making process    Radiology:    CTA ED chest PE study   Final Result      No pulmonary embolus.      No acute pulmonary disease.      No significant change since August 2022 in coarsened lung markings with mild diffuse juxtapleural reticulation, minimal traction bronchiolectasis, and mild honeycombing. 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      Pulmonary artery enlargement which can be seen with pulmonary hypertension.         Workstation performed: [id number]             All radiology studies independently viewed by me and interpreted by the radiologist.    Procedure    Procedures      ED Course of Care and Re-Assessments        Medications   iohexol (OMNIPAQUE) 350 MG/ML injection (MULTI-DOSE) 85 mL (85 mL Intravenous Given 12/19/23 8277)           FINAL IMPRESSION    Final diagnoses:   Dyspnea         DISPOSITION/PLAN      Time reflects when diagnosis was documented in both MDM as applicable and the Disposition within this note       Time User Action Codes Description Comment    12/19/2023  8:02 PM Zuleyma Carrillo Add [R06.00] Dyspnea           ED Disposition       ED Disposition   Discharge    Condition   Stable    Date/Time   Tue Dec 19, 2023  8:02 PM    Comment   Jessica Santamaria discharge to home/self care.                   Follow-up Information       Follow up With Specialties Details Why Contact Info    Patricia Dos Santos DO Family Medicine Schedule an appointment as soon as possible for a visit   [address] [place] [address]  [phone]                PATIENT REFERRED TO:    Patricia Dos Santos DO  [address] [place] [address]  [phone]    Schedule an appointment as soon as possible for a visit         DISCHARGE MEDICATIONS:    Discharge Medication List as of 12/19/2023  8:02 PM        CONTINUE these medications which have NOT CHANGED    Details   albuterol (PROVENTIL HFA,VENTOLIN HFA) 90 mcg/act inhaler INHALE 2 PUFFS EVERY 6 HOURS AS NEEDED FOR WHEEZING, Normal      ammonium lactate (LAC-HYDRIN) 12 % cream APPLY TO FEET TWICE DAILY FOR DRY SKIN, Historical Med      Biotin 1 MG CAPS Take by mouth, Historical Med      desloratadine (CLARINEX) 5 MG tablet Take 1 tablet (5 mg total) by mouth daily, Starting Tue 5/28/2019, Normal       ibuprofen (MOTRIN) 800 mg tablet TAKE 1 TABLET (800 MG) BY MOUTH 2 TIMES A DAY AS NEEDED FOR MODERATE PAIN (PAIN SCORE 4-6)., Historical Med      ketoconazole (NIZORAL) 2 % cream APPLY TWICE DAILY TO FOOT FOR 6 WEEKS, Historical Med      montelukast (SINGULAIR) 10 mg tablet TAKE 1 TABLET BY MOUTH EVERYDAY AT BEDTIME, Normal      Multiple Minerals-Vitamins (CALCIUM-MAGNESIUM-ZINC-D3) TABS Take by mouth daily., Historical Med      Multiple Vitamins-Minerals (MULTIVITAMIN WITH MINERALS) tablet Take 1 tablet by mouth daily, Historical Med      mycophenolate (CELLCEPT) 500 mg tablet TAKE THREE TABLETS BY MOUTH EVERY DAY IN THE MORNING AND TAKE TWO TABLETS BY MOUTH EVERY DAY AT BEDTIME, Historical Med      omega-3-acid ethyl esters (LOVAZA) 1 g capsule Take 2 g by mouth 2 (two) times a day, Historical Med      omeprazole (PriLOSEC) 20 mg delayed release capsule TAKE 1 CAPSULE BY MOUTH EVERY DAY, Normal      Specialty Vitamins Products (MAGNESIUM, AMINO ACID CHELATE,) 133 MG tablet Take 133 mg by mouth, Historical Med      triamcinolone (KENALOG) 0.1 % oral topical paste Apply 1 application topically 2 (two) times a day, Starting Mon 3/21/2022, Normal             No discharge procedures on file.         Zuleyma Carrillo, DO     Zuleyma Carrillo, DO  12/20/23 4677

## 2023-12-20 LAB
ATRIAL RATE: 68 BPM
P AXIS: 53 DEGREES
PR INTERVAL: 176 MS
QRS AXIS: 57 DEGREES
QRSD INTERVAL: 92 MS
QT INTERVAL: 388 MS
QTC INTERVAL: 412 MS
T WAVE AXIS: 31 DEGREES
VENTRICULAR RATE: 68 BPM

## 2024-01-15 DIAGNOSIS — K21.9 GASTROESOPHAGEAL REFLUX DISEASE WITHOUT ESOPHAGITIS: ICD-10-CM

## 2024-01-15 RX ORDER — OMEPRAZOLE 20 MG/1
CAPSULE, DELAYED RELEASE ORAL
Qty: 90 CAPSULE | Refills: 1 | Status: SHIPPED | OUTPATIENT
Start: 2024-01-15

## 2024-01-22 ENCOUNTER — RA CDI HCC (OUTPATIENT)
Dept: OTHER | Facility: HOSPITAL | Age: 69
End: 2024-01-22

## 2024-01-22 NOTE — PROGRESS NOTES
HCC coding opportunities          Chart Reviewed number of suggestions sent to Provider: 1  J84.9     Patients Insurance     Medicare Insurance: [facility] Medicare Advantage

## 2024-01-27 PROBLEM — J01.40 ACUTE NON-RECURRENT PANSINUSITIS: Status: RESOLVED | Noted: 2023-11-28 | Resolved: 2024-01-27

## 2024-02-03 DIAGNOSIS — J31.0 CHRONIC RHINITIS: ICD-10-CM

## 2024-02-03 RX ORDER — MONTELUKAST SODIUM 10 MG/1
TABLET ORAL
Qty: 90 TABLET | Refills: 1 | Status: SHIPPED | OUTPATIENT
Start: 2024-02-03

## 2024-02-21 PROBLEM — H10.13 ALLERGIC CONJUNCTIVITIS OF BOTH EYES: Status: RESOLVED | Noted: 2019-05-28 | Resolved: 2024-02-21

## 2024-05-24 ENCOUNTER — HOSPITAL ENCOUNTER (EMERGENCY)
Facility: HOSPITAL | Age: 69
Discharge: HOME | End: 2024-05-24
Attending: EMERGENCY MEDICINE
Payer: COMMERCIAL

## 2024-05-24 ENCOUNTER — APPOINTMENT (EMERGENCY)
Dept: RADIOLOGY | Facility: HOSPITAL | Age: 69
End: 2024-05-24
Payer: COMMERCIAL

## 2024-05-24 VITALS
OXYGEN SATURATION: 95 % | TEMPERATURE: 98.5 F | DIASTOLIC BLOOD PRESSURE: 67 MMHG | HEART RATE: 76 BPM | WEIGHT: 212 LBS | RESPIRATION RATE: 16 BRPM | BODY MASS INDEX: 36.39 KG/M2 | SYSTOLIC BLOOD PRESSURE: 141 MMHG

## 2024-05-24 DIAGNOSIS — J10.1 INFLUENZA A: ICD-10-CM

## 2024-05-24 DIAGNOSIS — R05.9 COUGH: ICD-10-CM

## 2024-05-24 DIAGNOSIS — R11.2 NAUSEA AND VOMITING: Primary | ICD-10-CM

## 2024-05-24 LAB
ALBUMIN SERPL BCP-MCNC: 4.3 G/DL (ref 3.5–5)
ALP SERPL-CCNC: 104 U/L (ref 34–104)
ALT SERPL W P-5'-P-CCNC: 15 U/L (ref 7–52)
ANION GAP SERPL CALCULATED.3IONS-SCNC: 8 MMOL/L (ref 4–13)
AST SERPL W P-5'-P-CCNC: 24 U/L (ref 13–39)
ATRIAL RATE: 80 BPM
BASOPHILS # BLD AUTO: 0.01 THOUSANDS/ÂΜL (ref 0–0.1)
BASOPHILS NFR BLD AUTO: 0 % (ref 0–1)
BILIRUB DIRECT SERPL-MCNC: 0.09 MG/DL (ref 0–0.2)
BILIRUB SERPL-MCNC: 0.51 MG/DL (ref 0.2–1)
BUN SERPL-MCNC: 12 MG/DL (ref 5–25)
CALCIUM SERPL-MCNC: 9 MG/DL (ref 8.4–10.2)
CARDIAC TROPONIN I PNL SERPL HS: 7 NG/L
CHLORIDE SERPL-SCNC: 98 MMOL/L (ref 96–108)
CO2 SERPL-SCNC: 28 MMOL/L (ref 21–32)
CREAT SERPL-MCNC: 0.53 MG/DL (ref 0.6–1.3)
EOSINOPHIL # BLD AUTO: 0 THOUSAND/ÂΜL (ref 0–0.61)
EOSINOPHIL NFR BLD AUTO: 0 % (ref 0–6)
ERYTHROCYTE [DISTWIDTH] IN BLOOD BY AUTOMATED COUNT: 14.1 % (ref 11.6–15.1)
FLUAV RNA RESP QL NAA+PROBE: POSITIVE
FLUBV RNA RESP QL NAA+PROBE: NEGATIVE
GFR SERPL CREATININE-BSD FRML MDRD: 96 ML/MIN/1.73SQ M
GLUCOSE SERPL-MCNC: 116 MG/DL (ref 65–140)
HCT VFR BLD AUTO: 35.4 % (ref 34.8–46.1)
HGB BLD-MCNC: 11.5 G/DL (ref 11.5–15.4)
IMM GRANULOCYTES # BLD AUTO: 0.02 THOUSAND/UL (ref 0–0.2)
IMM GRANULOCYTES NFR BLD AUTO: 0 % (ref 0–2)
LACTATE SERPL-SCNC: 0.8 MMOL/L (ref 0.5–2)
LIPASE SERPL-CCNC: 29 U/L (ref 11–82)
LYMPHOCYTES # BLD AUTO: 0.45 THOUSANDS/ÂΜL (ref 0.6–4.47)
LYMPHOCYTES NFR BLD AUTO: 9 % (ref 14–44)
MCH RBC QN AUTO: 29.9 PG (ref 26.8–34.3)
MCHC RBC AUTO-ENTMCNC: 32.5 G/DL (ref 31.4–37.4)
MCV RBC AUTO: 92 FL (ref 82–98)
MONOCYTES # BLD AUTO: 0.28 THOUSAND/ÂΜL (ref 0.17–1.22)
MONOCYTES NFR BLD AUTO: 6 % (ref 4–12)
NEUTROPHILS # BLD AUTO: 4.06 THOUSANDS/ÂΜL (ref 1.85–7.62)
NEUTS SEG NFR BLD AUTO: 85 % (ref 43–75)
NRBC BLD AUTO-RTO: 0 /100 WBCS
P AXIS: 43 DEGREES
PLATELET # BLD AUTO: 194 THOUSANDS/UL (ref 149–390)
PMV BLD AUTO: 8.8 FL (ref 8.9–12.7)
POTASSIUM SERPL-SCNC: 3.5 MMOL/L (ref 3.5–5.3)
PR INTERVAL: 174 MS
PROCALCITONIN SERPL-MCNC: 0.06 NG/ML
PROT SERPL-MCNC: 8.3 G/DL (ref 6.4–8.4)
QRS AXIS: 42 DEGREES
QRSD INTERVAL: 92 MS
QT INTERVAL: 390 MS
QTC INTERVAL: 449 MS
RBC # BLD AUTO: 3.85 MILLION/UL (ref 3.81–5.12)
RSV RNA RESP QL NAA+PROBE: NEGATIVE
SARS-COV-2 RNA RESP QL NAA+PROBE: NEGATIVE
SODIUM SERPL-SCNC: 134 MMOL/L (ref 135–147)
T WAVE AXIS: 7 DEGREES
VENTRICULAR RATE: 80 BPM
WBC # BLD AUTO: 4.82 THOUSAND/UL (ref 4.31–10.16)

## 2024-05-24 PROCEDURE — 93010 ELECTROCARDIOGRAM REPORT: CPT | Performed by: STUDENT IN AN ORGANIZED HEALTH CARE EDUCATION/TRAINING PROGRAM

## 2024-05-24 PROCEDURE — 99284 EMERGENCY DEPT VISIT MOD MDM: CPT

## 2024-05-24 PROCEDURE — 96375 TX/PRO/DX INJ NEW DRUG ADDON: CPT

## 2024-05-24 PROCEDURE — 83690 ASSAY OF LIPASE: CPT | Performed by: EMERGENCY MEDICINE

## 2024-05-24 PROCEDURE — 99285 EMERGENCY DEPT VISIT HI MDM: CPT | Performed by: EMERGENCY MEDICINE

## 2024-05-24 PROCEDURE — 80048 BASIC METABOLIC PNL TOTAL CA: CPT | Performed by: EMERGENCY MEDICINE

## 2024-05-24 PROCEDURE — 85025 COMPLETE CBC W/AUTO DIFF WBC: CPT | Performed by: EMERGENCY MEDICINE

## 2024-05-24 PROCEDURE — 36415 COLL VENOUS BLD VENIPUNCTURE: CPT | Performed by: EMERGENCY MEDICINE

## 2024-05-24 PROCEDURE — 80076 HEPATIC FUNCTION PANEL: CPT | Performed by: EMERGENCY MEDICINE

## 2024-05-24 PROCEDURE — 96365 THER/PROPH/DIAG IV INF INIT: CPT

## 2024-05-24 PROCEDURE — 71046 X-RAY EXAM CHEST 2 VIEWS: CPT

## 2024-05-24 PROCEDURE — 84484 ASSAY OF TROPONIN QUANT: CPT | Performed by: EMERGENCY MEDICINE

## 2024-05-24 PROCEDURE — 83605 ASSAY OF LACTIC ACID: CPT | Performed by: EMERGENCY MEDICINE

## 2024-05-24 PROCEDURE — 0241U HB NFCT DS VIR RESP RNA 4 TRGT: CPT | Performed by: EMERGENCY MEDICINE

## 2024-05-24 PROCEDURE — 96368 THER/DIAG CONCURRENT INF: CPT

## 2024-05-24 PROCEDURE — 84145 PROCALCITONIN (PCT): CPT | Performed by: EMERGENCY MEDICINE

## 2024-05-24 PROCEDURE — 93005 ELECTROCARDIOGRAM TRACING: CPT

## 2024-05-24 PROCEDURE — 96366 THER/PROPH/DIAG IV INF ADDON: CPT

## 2024-05-24 RX ORDER — ONDANSETRON 4 MG/1
4 TABLET, FILM COATED ORAL EVERY 6 HOURS PRN
Qty: 16 TABLET | Refills: 0 | Status: SHIPPED | OUTPATIENT
Start: 2024-05-24

## 2024-05-24 RX ORDER — FAMOTIDINE 20 MG/1
20 TABLET, FILM COATED ORAL ONCE
Status: COMPLETED | OUTPATIENT
Start: 2024-05-24 | End: 2024-05-24

## 2024-05-24 RX ORDER — ONDANSETRON 2 MG/ML
4 INJECTION INTRAMUSCULAR; INTRAVENOUS ONCE
Status: COMPLETED | OUTPATIENT
Start: 2024-05-24 | End: 2024-05-24

## 2024-05-24 RX ORDER — MAGNESIUM HYDROXIDE/ALUMINUM HYDROXICE/SIMETHICONE 120; 1200; 1200 MG/30ML; MG/30ML; MG/30ML
30 SUSPENSION ORAL ONCE
Status: COMPLETED | OUTPATIENT
Start: 2024-05-24 | End: 2024-05-24

## 2024-05-24 RX ORDER — KETOROLAC TROMETHAMINE 30 MG/ML
15 INJECTION, SOLUTION INTRAMUSCULAR; INTRAVENOUS ONCE
Status: COMPLETED | OUTPATIENT
Start: 2024-05-24 | End: 2024-05-24

## 2024-05-24 RX ORDER — DEXTROMETHORPHAN HYDROBROMIDE AND PROMETHAZINE HYDROCHLORIDE 15; 6.25 MG/5ML; MG/5ML
5 SYRUP ORAL
Qty: 118 ML | Refills: 0 | Status: SHIPPED | OUTPATIENT
Start: 2024-05-24

## 2024-05-24 RX ORDER — ACETAMINOPHEN 10 MG/ML
1000 INJECTION, SOLUTION INTRAVENOUS ONCE
Status: COMPLETED | OUTPATIENT
Start: 2024-05-24 | End: 2024-05-24

## 2024-05-24 RX ORDER — METHOCARBAMOL 500 MG/1
1000 TABLET, FILM COATED ORAL ONCE
Status: COMPLETED | OUTPATIENT
Start: 2024-05-24 | End: 2024-05-24

## 2024-05-24 RX ORDER — SODIUM CHLORIDE, SODIUM GLUCONATE, SODIUM ACETATE, POTASSIUM CHLORIDE, MAGNESIUM CHLORIDE, SODIUM PHOSPHATE, DIBASIC, AND POTASSIUM PHOSPHATE .53; .5; .37; .037; .03; .012; .00082 G/100ML; G/100ML; G/100ML; G/100ML; G/100ML; G/100ML; G/100ML
1000 INJECTION, SOLUTION INTRAVENOUS ONCE
Status: COMPLETED | OUTPATIENT
Start: 2024-05-24 | End: 2024-05-24

## 2024-05-24 RX ADMIN — ALUMINUM HYDROXIDE, MAGNESIUM HYDROXIDE, DIMETHICONE 30 ML: 400; 400; 40 SUSPENSION ORAL at 23:41

## 2024-05-24 RX ADMIN — KETOROLAC TROMETHAMINE 15 MG: 30 INJECTION, SOLUTION INTRAMUSCULAR; INTRAVENOUS at 22:19

## 2024-05-24 RX ADMIN — SODIUM CHLORIDE, SODIUM GLUCONATE, SODIUM ACETATE, POTASSIUM CHLORIDE, MAGNESIUM CHLORIDE, SODIUM PHOSPHATE, DIBASIC, AND POTASSIUM PHOSPHATE 1000 ML: .53; .5; .37; .037; .03; .012; .00082 INJECTION, SOLUTION INTRAVENOUS at 21:44

## 2024-05-24 RX ADMIN — ONDANSETRON 4 MG: 2 INJECTION INTRAMUSCULAR; INTRAVENOUS at 22:20

## 2024-05-24 RX ADMIN — METHOCARBAMOL 1000 MG: 500 TABLET ORAL at 23:41

## 2024-05-24 RX ADMIN — FAMOTIDINE 20 MG: 20 TABLET, FILM COATED ORAL at 23:41

## 2024-05-24 RX ADMIN — ACETAMINOPHEN 1000 MG: 10 INJECTION INTRAVENOUS at 22:25

## 2024-05-25 NOTE — ED PROVIDER NOTES
History  Chief Complaint   Patient presents with    Abdominal Pain     Reports arrived from DR graham with N/V, abd pain and headache since yesterday      HPI    Prior to Admission Medications   Prescriptions Last Dose Informant Patient Reported? Taking?   Biotin 1 MG CAPS   Yes No   Sig: Take by mouth   Multiple Minerals-Vitamins (CALCIUM-MAGNESIUM-ZINC-D3) TABS  Self Yes No   Sig: Take by mouth daily.   Multiple Vitamins-Minerals (MULTIVITAMIN WITH MINERALS) tablet  Self Yes No   Sig: Take 1 tablet by mouth daily   Specialty Vitamins Products (MAGNESIUM, AMINO ACID CHELATE,) 133 MG tablet  Self Yes No   Sig: Take 133 mg by mouth   albuterol (PROVENTIL HFA,VENTOLIN HFA) 90 mcg/act inhaler   No No   Sig: INHALE 2 PUFFS EVERY 6 HOURS AS NEEDED FOR WHEEZING   ammonium lactate (LAC-HYDRIN) 12 % cream   Yes No   Sig: APPLY TO FEET TWICE DAILY FOR DRY SKIN   desloratadine (CLARINEX) 5 MG tablet  Self No No   Sig: Take 1 tablet (5 mg total) by mouth daily   ibuprofen (MOTRIN) 800 mg tablet   Yes No   Sig: TAKE 1 TABLET (800 MG) BY MOUTH 2 TIMES A DAY AS NEEDED FOR MODERATE PAIN (PAIN SCORE 4-6).   ketoconazole (NIZORAL) 2 % cream   Yes No   Sig: APPLY TWICE DAILY TO FOOT FOR 6 WEEKS   montelukast (SINGULAIR) 10 mg tablet   No No   Sig: TAKE 1 TABLET BY MOUTH EVERYDAY AT BEDTIME   mycophenolate (CELLCEPT) 500 mg tablet   Yes No   Sig: TAKE THREE TABLETS BY MOUTH EVERY DAY IN THE MORNING AND TAKE TWO TABLETS BY MOUTH EVERY DAY AT BEDTIME   omega-3-acid ethyl esters (LOVAZA) 1 g capsule   Yes No   Sig: Take 2 g by mouth 2 (two) times a day   omeprazole (PriLOSEC) 20 mg delayed release capsule   No No   Sig: TAKE 1 CAPSULE BY MOUTH EVERY DAY   triamcinolone (KENALOG) 0.1 % oral topical paste   No No   Sig: Apply 1 application topically 2 (two) times a day      Facility-Administered Medications: None       Past Medical History:   Diagnosis Date    Allergic     Allergic rhinitis     [id number]  RESOLVED    Candidiasis of breast      2016 RESOLVED    Diverticulosis     Hemorrhoids     Lung nodule     Pneumonia     PONV (postoperative nausea and vomiting)     Psoriatic arthritis (HCC)     Sjogren's syndrome (HCC)     Followed by rheumatology - Dr. Rocha       Past Surgical History:   Procedure Laterality Date    BREAST LUMPECTOMY Left     COLONOSCOPY      VA BRNCHSC INCL FLUOR GDNCE DX W/CELL WASHG SPX N/A 2017    Procedure: BRONCHOSCOPY FLEXIBLE;  Surgeon: Madan Blake MD;  Location: [facility];  Service: Pulmonary    VA COLONOSCOPY FLX DX W/COLLJ SPEC WHEN PFRMD N/A 2016    Procedure: COLONOSCOPY;  Surgeon: Theresa Narvaez MD;  Location: [facility];  Service: General    UMBILICAL HERNIA REPAIR         Family History   Problem Relation Age of Onset    Hypertension Mother     Hypothyroidism Mother     Breast cancer Sister 42    Arthritis Family      I have reviewed and agree with the history as documented.    E-Cigarette/Vaping    E-Cigarette Use Never User      E-Cigarette/Vaping Substances    Nicotine No     THC No     CBD No     Flavoring No     Other No     Unknown No      Social History     Tobacco Use    Smoking status: Former     Current packs/day: 0.00     Average packs/day: 0.5 packs/day for 15.0 years (7.5 ttl pk-yrs)     Types: Cigarettes     Start date:      Quit date:      Years since quittin.4    Smokeless tobacco: Never    Tobacco comments:     DENIED HISTORY OF TOBACCO USE   Vaping Use    Vaping status: Never Used   Substance Use Topics    Alcohol use: No    Drug use: No       Review of Systems    Physical Exam  Physical Exam    Vital Signs  ED Triage Vitals [24]   Temperature Pulse Respirations Blood Pressure SpO2   98.5 °F (36.9 °C) 97 20 134/76 97 %      Temp Source Heart Rate Source Patient Position - Orthostatic VS BP Location FiO2 (%)   Oral Monitor Sitting Right arm --      Pain Score       9           Vitals:    24   BP: 134/76 135/79   Pulse: 97 86    Patient Position - Orthostatic VS: Sitting Sitting         Visual Acuity      ED Medications  Medications   multi-electrolyte (ISOLYTE-S PH 7.4) bolus 1,000 mL (1,000 mL Intravenous New Bag 5/24/24 2144)   ondansetron (ZOFRAN) injection 4 mg (4 mg Intravenous Given 5/24/24 2220)   ketorolac (TORADOL) injection 15 mg (15 mg Intravenous Given 5/24/24 2219)   acetaminophen (Ofirmev) injection 1,000 mg (0 mg Intravenous Stopped 5/24/24 2246)       Diagnostic Studies  Results Reviewed       Procedure Component Value Units Date/Time    FLU/RSV/COVID - if FLU/RSV clinically relevant [[id number]]  (Abnormal) Collected: 05/24/24 2143    Lab Status: Final result Specimen: Nares from Nose Updated: 05/24/24 2225     SARS-CoV-2 Negative     INFLUENZA A PCR Positive     INFLUENZA B PCR Negative     RSV PCR Negative    Narrative:      FOR PEDIATRIC PATIENTS - copy/paste COVID Guidelines URL to browser: [email or web address]    SARS-CoV-2 assay is a Nucleic Acid Amplification assay intended for the  qualitative detection of nucleic acid from SARS-CoV-2 in nasopharyngeal  swabs. Results are for the presumptive identification of SARS-CoV-2 RNA.    Positive results are indicative of infection with SARS-CoV-2, the virus  causing COVID-19, but do not rule out bacterial infection or co-infection  with other viruses. Laboratories within the [place] and its  territories are required to report all positive results to the appropriate  public health authorities. Negative results do not preclude SARS-CoV-2  infection and should not be used as the sole basis for treatment or other  patient management decisions. Negative results must be combined with  clinical observations, patient history, and epidemiological information.  This test has not been FDA cleared or approved.    This test has been authorized by FDA under an Emergency Use Authorization  (EUA). This test is only authorized for  the duration of time the  declaration that circumstances exist justifying the authorization of the  emergency use of an in vitro diagnostic tests for detection of SARS-CoV-2  virus and/or diagnosis of COVID-19 infection under section 564(b)(1) of  the Act, 21 U.S.C. 360bbb-3(b)(1), unless the authorization is terminated  or revoked sooner. The test has been validated but independent review by FDA  and CLIA is pending.    Test performed using [employer] Gene[employer]pert: This RT-PCR assay targets N2,  a region unique to SARS-CoV-2. A conserved region in the E-gene was chosen  for pan-Sarbecovirus detection which includes SARS-CoV-2.    According to CMS-2020-01-R, this platform meets the definition of high-throughput technology.    Procalcitonin [[id number]]  (Normal) Collected: 05/24/24 2143    Lab Status: Final result Specimen: Blood from Arm, Left Updated: 05/24/24 2213     Procalcitonin 0.06 ng/ml     HS Troponin I 2hr [[id number]]     Lab Status: No result Specimen: Blood     HS Troponin 0hr (reflex protocol) [[id number]]  (Normal) Collected: 05/24/24 2143    Lab Status: Final result Specimen: Blood from Arm, Left Updated: 05/24/24 2212     hs TnI 0hr 7 ng/L     Basic metabolic panel [[id number]]  (Abnormal) Collected: 05/24/24 2143    Lab Status: Final result Specimen: Blood from Arm, Left Updated: 05/24/24 2204     Sodium 134 mmol/L      Potassium 3.5 mmol/L      Chloride 98 mmol/L      CO2 28 mmol/L      ANION GAP 8 mmol/L      BUN 12 mg/dL      Creatinine 0.53 mg/dL      Glucose 116 mg/dL      Calcium 9.0 mg/dL      eGFR 96 ml/min/1.73sq m     Narrative:      National Kidney Disease Foundation guidelines for Chronic Kidney Disease (CKD):     Stage 1 with normal or high GFR (GFR > 90 mL/min/1.73 square meters)    Stage 2 Mild CKD (GFR = 60-89 mL/min/1.73 square meters)    Stage 3A Moderate CKD (GFR = 45-59 mL/min/1.73 square meters)    Stage 3B Moderate CKD (GFR = 30-44 mL/min/1.73 square meters)    Stage 4 Severe CKD (GFR  = 15-29 mL/min/1.73 square meters)    Stage 5 End Stage CKD (GFR <15 mL/min/1.73 square meters)  Note: GFR calculation is accurate only with a steady state creatinine    Hepatic function panel [[id number]]  (Normal) Collected: 05/24/24 2143    Lab Status: Final result Specimen: Blood from Arm, Left Updated: 05/24/24 2204     Total Bilirubin 0.51 mg/dL      Bilirubin, Direct 0.09 mg/dL      Alkaline Phosphatase 104 U/L      AST 24 U/L      ALT 15 U/L      Total Protein 8.3 g/dL      Albumin 4.3 g/dL     Lipase [[id number]]  (Normal) Collected: 05/24/24 2143    Lab Status: Final result Specimen: Blood from Arm, Left Updated: 05/24/24 2204     Lipase 29 u/L     Lactic acid, plasma (w/reflex if result > 2.0) [[id number]]  (Normal) Collected: 05/24/24 2143    Lab Status: Final result Specimen: Blood from Arm, Left Updated: 05/24/24 2202     LACTIC ACID 0.8 mmol/L     Narrative:      Result may be elevated if tourniquet was used during collection.    CBC and differential [[id number]]  (Abnormal) Collected: 05/24/24 2143    Lab Status: Final result Specimen: Blood from Arm, Left Updated: 05/24/24 2148     WBC 4.82 Thousand/uL      RBC 3.85 Million/uL      Hemoglobin 11.5 g/dL      Hematocrit 35.4 %      MCV 92 fL      MCH 29.9 pg      MCHC 32.5 g/dL      RDW 14.1 %      MPV 8.8 fL      Platelets 194 Thousands/uL      nRBC 0 /100 WBCs      Segmented % 85 %      Immature Grans % 0 %      Lymphocytes % 9 %      Monocytes % 6 %      Eosinophils Relative 0 %      Basophils Relative 0 %      Absolute Neutrophils 4.06 Thousands/µL      Absolute Immature Grans 0.02 Thousand/uL      Absolute Lymphocytes 0.45 Thousands/µL      Absolute Monocytes 0.28 Thousand/µL      Eosinophils Absolute 0.00 Thousand/µL      Basophils Absolute 0.01 Thousands/µL                    XR chest 2 views    (Results Pending)              Procedures  Procedures         ED Course                                             Medical Decision Making  Amount and/or  Complexity of Data Reviewed  Labs: ordered.  Radiology: ordered.    Risk  Prescription drug management.             Disposition  Final diagnoses:   None     ED Disposition       None          Follow-up Information    None         Patient's Medications   Discharge Prescriptions    No medications on file       No discharge procedures on file.    PDMP Review       None            ED Provider  Electronically Signed by           0 %      Basophils Relative 0 %      Absolute Neutrophils 4.06 Thousands/µL      Absolute Immature Grans 0.02 Thousand/uL      Absolute Lymphocytes 0.45 Thousands/µL      Absolute Monocytes 0.28 Thousand/µL      Eosinophils Absolute 0.00 Thousand/µL      Basophils Absolute 0.01 Thousands/µL                    XR chest 2 views   Final Result by Chata Barriga MD (05/25 0553)      No definite acute disease with increased lung markings due to pulmonary fibrosis.      Redemonstration of pulmonary artery enlargement which can be seen with pulmonary hypertension or can be a normal variant.            Workstation performed: [id number]                    Procedures  Procedures         ED Course                                             Medical Decision Making  1-year-old female here with nausea, vomiting, diarrhea and general malaise for several days.  Reports multiple sick contacts with similar symptoms.  She tested positive for influenza A today which is almost certainly the reason for her symptoms.  She was given symptomatic treatment with antiemetics, IV fluids here in the ED and reports subjective improvement of her symptoms.  Will plan to discharge with symptomatic treatment, outpatient follow-up and return precautions.    Amount and/or Complexity of Data Reviewed  Labs: ordered. Decision-making details documented in ED Course.  Radiology: ordered and independent interpretation performed. Decision-making details documented in ED Course.     Details: Chest x-ray is negative for pneumonia or pneumothorax.    Risk  OTC drugs.  Prescription drug management.             Disposition  Final diagnoses:   Nausea and vomiting   Cough   Influenza A     Time reflects when diagnosis was documented in both MDM as applicable and the Disposition within this note       Time User Action Codes Description Comment    5/24/2024 11:27 PM Yonis Bhat [R11.2] Nausea and vomiting     5/24/2024 11:27 PM Yonis Bhat Add  [R05.9] Cough     5/24/2024 11:27 PM Yonis Bhat Add [J10.1] Influenza A           ED Disposition       ED Disposition   Discharge    Condition   Stable    Date/Time   Fri May 24, 2024 11:27 PM    Comment   Jessica Santamaria discharge to home/self care.                   Follow-up Information       Follow up With Specialties Details Why Contact Info    Patricia Dos Santos DO [facility]   [address]  [address]  [place] [address]  [phone]              Discharge Medication List as of 5/24/2024 11:29 PM        START taking these medications    Details   ondansetron (ZOFRAN) 4 mg tablet Take 1 tablet (4 mg total) by mouth every 6 (six) hours as needed for nausea or vomiting, Starting Fri 5/24/2024, Normal      promethazine-dextromethorphan (PHENERGAN-DM) 6.25-15 mg/5 mL oral syrup Take 5 mL by mouth daily at bedtime as needed for cough, Starting Fri 5/24/2024, Normal           CONTINUE these medications which have NOT CHANGED    Details   albuterol (PROVENTIL HFA,VENTOLIN HFA) 90 mcg/act inhaler INHALE 2 PUFFS EVERY 6 HOURS AS NEEDED FOR WHEEZING, Normal      ammonium lactate (LAC-HYDRIN) 12 % cream APPLY TO FEET TWICE DAILY FOR DRY SKIN, Historical Med      Biotin 1 MG CAPS Take by mouth, Historical Med      desloratadine (CLARINEX) 5 MG tablet Take 1 tablet (5 mg total) by mouth daily, Starting Tue 5/28/2019, Normal      ibuprofen (MOTRIN) 800 mg tablet TAKE 1 TABLET (800 MG) BY MOUTH 2 TIMES A DAY AS NEEDED FOR MODERATE PAIN (PAIN SCORE 4-6)., Historical Med      ketoconazole (NIZORAL) 2 % cream APPLY TWICE DAILY TO FOOT FOR 6 WEEKS, Historical Med      montelukast (SINGULAIR) 10 mg tablet TAKE 1 TABLET BY MOUTH EVERYDAY AT BEDTIME, Normal      Multiple Minerals-Vitamins (CALCIUM-MAGNESIUM-ZINC-D3) TABS Take by mouth daily., Historical Med      Multiple Vitamins-Minerals (MULTIVITAMIN WITH MINERALS) tablet Take 1 tablet by mouth daily, Historical Med      mycophenolate (CELLCEPT) 500 mg tablet TAKE  THREE TABLETS BY MOUTH EVERY DAY IN THE MORNING AND TAKE TWO TABLETS BY MOUTH EVERY DAY AT BEDTIME, Historical Med      omega-3-acid ethyl esters (LOVAZA) 1 g capsule Take 2 g by mouth 2 (two) times a day, Historical Med      Specialty Vitamins Products (MAGNESIUM, AMINO ACID CHELATE,) 133 MG tablet Take 133 mg by mouth, Historical Med      triamcinolone (KENALOG) 0.1 % oral topical paste Apply 1 application topically 2 (two) times a day, Starting Mon 3/21/2022, Normal      omeprazole (PriLOSEC) 20 mg delayed release capsule TAKE 1 CAPSULE BY MOUTH EVERY DAY, Normal             No discharge procedures on file.    PDMP Review       None            ED Provider  Electronically Signed by             Yonis Bhat MD  06/12/24 0793

## 2024-06-03 ENCOUNTER — OFFICE VISIT (OUTPATIENT)
Dept: FAMILY MEDICINE CLINIC | Facility: CLINIC | Age: 69
End: 2024-06-03
Payer: COMMERCIAL

## 2024-06-03 VITALS
OXYGEN SATURATION: 99 % | WEIGHT: 215 LBS | HEART RATE: 53 BPM | DIASTOLIC BLOOD PRESSURE: 81 MMHG | HEIGHT: 64 IN | BODY MASS INDEX: 36.7 KG/M2 | SYSTOLIC BLOOD PRESSURE: 127 MMHG

## 2024-06-03 DIAGNOSIS — J84.9 ILD (INTERSTITIAL LUNG DISEASE) (HCC): ICD-10-CM

## 2024-06-03 DIAGNOSIS — R11.0 NAUSEA: ICD-10-CM

## 2024-06-03 DIAGNOSIS — E66.01 CLASS 2 SEVERE OBESITY DUE TO EXCESS CALORIES WITH SERIOUS COMORBIDITY AND BODY MASS INDEX (BMI) OF 36.0 TO 36.9 IN ADULT (HCC): ICD-10-CM

## 2024-06-03 DIAGNOSIS — K21.9 GASTROESOPHAGEAL REFLUX DISEASE WITHOUT ESOPHAGITIS: Primary | ICD-10-CM

## 2024-06-03 DIAGNOSIS — D22.9 ATYPICAL NEVI: ICD-10-CM

## 2024-06-03 DIAGNOSIS — J01.40 ACUTE NON-RECURRENT PANSINUSITIS: ICD-10-CM

## 2024-06-03 DIAGNOSIS — M35.02 SJOGREN'S SYNDROME WITH LUNG INVOLVEMENT (HCC): ICD-10-CM

## 2024-06-03 PROCEDURE — G2211 COMPLEX E/M VISIT ADD ON: HCPCS | Performed by: FAMILY MEDICINE

## 2024-06-03 PROCEDURE — 99214 OFFICE O/P EST MOD 30 MIN: CPT | Performed by: FAMILY MEDICINE

## 2024-06-03 RX ORDER — AMOXICILLIN 875 MG/1
875 TABLET, COATED ORAL 2 TIMES DAILY
Qty: 20 TABLET | Refills: 0 | Status: SHIPPED | OUTPATIENT
Start: 2024-06-03 | End: 2024-06-13

## 2024-06-03 RX ORDER — OMEPRAZOLE 40 MG/1
40 CAPSULE, DELAYED RELEASE ORAL DAILY
Qty: 90 CAPSULE | Refills: 0 | Status: SHIPPED | OUTPATIENT
Start: 2024-06-03

## 2024-06-03 NOTE — PROGRESS NOTES
Ambulatory Visit  Name: Jessica Santamaria      : 1955      MRN: [id number]  Encounter Provider: Patricia Dos Santos DO  Encounter Date: 6/3/2024   Encounter department: [facility] PRIMARY CARE    Assessment & Plan   1. Gastroesophageal reflux disease without esophagitis  Assessment & Plan:  Increase the prilosec to 40 mg gastritis diet reviewed rerfer to Gi  Orders:  -     omeprazole (PriLOSEC) 40 MG capsule; Take 1 capsule (40 mg total) by mouth daily  -     Ambulatory Referral to Gastroenterology; Future  2. Nausea  -     omeprazole (PriLOSEC) 40 MG capsule; Take 1 capsule (40 mg total) by mouth daily  -     Ambulatory Referral to Gastroenterology; Future  3. Acute non-recurrent pansinusitis  Assessment & Plan:  Patient had viral infection and ahs allergies however now that the mucous is discolored and patient has the facial pain I will treat with antibiotic amoxil   Orders:  -     amoxicillin (AMOXIL) 875 mg tablet; Take 1 tablet (875 mg total) by mouth 2 (two) times a day for 10 days  4. ILD (interstitial lung disease) (Formerly [facility])  Assessment & Plan:  Patient is seeing pulmonary She will continue singulair and albuterol I will see her in 6 months   5. Sjogren's syndrome with lung involvement ([facility])  Assessment & Plan:  Patient is seeing rheumatology and pulmonayr continue singulair and the albuterol   6. Class 2 severe obesity due to excess calories with serious comorbidity and body mass index (BMI) of 36.0 to 36.9 in adult (Formerly [facility])  Assessment & Plan:  Weight is unchanged discussed diet   7. Atypical nevi  Assessment & Plan:  Has enlarged Will refer to plastics for removal  Orders:  -     Ambulatory Referral to Plastic Surgery; Future       Chief Complaint   Patient presents with    Follow-up     Er followup for influenza       History of Present Illness     Elise is here for Er followup from May 24, 2024 she had nausea and cough Patient was diagnosed with influenza Patient now is still with cough but has  "also discolored mucous and sinus pain for absout 1 weeks Patient is taking her allergy medication and OT medication hShe continues with the nausea and belching patient feels it is worsened despite the use of prilosec 20 mg daily She has one episode of vomiting 1 week ago from this past Friday Patient also has concern with lesion on her left chest wall that is getting larger and darker in color patient weight is stable Patient with no weight loss or bowel issues She has interstitialy lung disease but that did not seem to be effected by th flu        Review of Systems   Constitutional:  Negative for fatigue, fever and unexpected weight change.   HENT:  Positive for congestion, postnasal drip, rhinorrhea, sinus pressure and sinus pain. Negative for trouble swallowing.    Eyes:  Negative for discharge and visual disturbance.   Respiratory:  Negative for cough, chest tightness, shortness of breath and wheezing.    Cardiovascular:  Negative for chest pain, palpitations and leg swelling.   Gastrointestinal:  Positive for abdominal pain and nausea. Negative for blood in stool, constipation, diarrhea and vomiting.   Genitourinary:  Negative for difficulty urinating, dysuria, frequency and hematuria.   Musculoskeletal:  Negative for arthralgias, gait problem and joint swelling.   Skin:  Negative for rash and wound.        Lesion on the left chest has gotten larger    Allergic/Immunologic: Negative for environmental allergies and food allergies.   Neurological:  Negative for dizziness, syncope, weakness, numbness and headaches.   Hematological:  Negative for adenopathy. Does not bruise/bleed easily.   Psychiatric/Behavioral:  Negative for confusion, decreased concentration and sleep disturbance. The patient is not nervous/anxious.        Objective     /81 (BP Location: Left arm, Patient Position: Sitting, Cuff Size: Large)   Pulse (!) 53   Ht 5' 4\" (1.626 m)   Wt 97.5 kg (215 lb)   SpO2 99%   BMI 36.90 kg/m² "     Physical Exam  Vitals and nursing note reviewed.   Constitutional:       Appearance: She is well-developed.   HENT:      Head: Normocephalic and atraumatic.      Comments: Maxillayr and frontal sinus pain     Right Ear: Hearing, tympanic membrane and external ear normal.      Left Ear: Hearing, tympanic membrane and external ear normal.      Nose: Rhinorrhea present.      Mouth/Throat:      Comments: PND  Eyes:      Extraocular Movements: Extraocular movements intact.      Conjunctiva/sclera: Conjunctivae normal.      Pupils: Pupils are equal, round, and reactive to light.   Neck:      Thyroid: No thyromegaly.   Cardiovascular:      Rate and Rhythm: Normal rate.      Heart sounds: Normal heart sounds.   Pulmonary:      Effort: Pulmonary effort is normal.      Breath sounds: Normal breath sounds. No wheezing or rales.   Abdominal:      General: Bowel sounds are normal. There is no distension.      Palpations: Abdomen is soft.      Tenderness: There is abdominal tenderness.      Comments: Epigastric pain to palpation   Musculoskeletal:         General: No tenderness.      Cervical back: Neck supple.   Lymphadenopathy:      Cervical: No cervical adenopathy.   Skin:     General: Skin is warm and dry.      Findings: Lesion present. No rash.      Comments: Dark colored irregular shaped lesion left chest wall    Neurological:      Mental Status: She is alert and oriented to person, place, and time.      Cranial Nerves: No cranial nerve deficit.      Coordination: Coordination normal.   Psychiatric:         Mood and Affect: Mood normal.         Behavior: Behavior normal.         Thought Content: Thought content normal.         Judgment: Judgment normal.       Administrative Statements

## 2024-06-03 NOTE — ASSESSMENT & PLAN NOTE
Patient had viral infection and ahs allergies however now that the mucous is discolored and patient has the facial pain I will treat with antibiotic amoxil

## 2024-06-03 NOTE — PATIENT INSTRUCTIONS
Diet for Stomach Ulcers and Gastritis   AMBULATORY CARE:   A diet for stomach ulcers and gastritis  is a meal plan that limits foods that irritate your stomach. Certain foods may worsen symptoms such as stomach pain, bloating, heartburn, or indigestion.  Foods to limit or avoid:  You may need to avoid acidic, spicy, or high-fat foods. Not all foods affect everyone the same way. You will need to learn which foods worsen your symptoms and limit those foods. The following are some foods that may worsen ulcer or gastritis symptoms:  Beverages:      Whole milk and chocolate milk    Hot cocoa and cola    Any beverage with caffeine    Regular and decaffeinated coffee    Peppermint and spearmint tea    Green and black tea, with or without caffeine    Orange and grapefruit juices    Drinks that contain alcohol    Spices and seasonings:      Black and red pepper    Chili powder    Mustard seed and nutmeg    Other foods:      Dairy foods made from whole milk or cream    Chocolate    Spicy or strongly flavored cheeses, such as jalapeno or black pepper    Highly seasoned, high-fat meats, such as sausage, salami, velazquze, ham, and cold cuts    Hot chiles and peppers    Tomato products, such as tomato paste, tomato sauce, or tomato juice    Foods to include:  Eat a variety of healthy foods from all the food groups. Eat fruits, vegetables, whole grains, and fat-free or low-fat dairy foods. Whole grains include whole-wheat breads, cereals, pasta, and brown rice. Choose lean meats, poultry (chicken and turkey), fish, beans, eggs, and nuts. A healthy meal plan is low in unhealthy fats, salt, and added sugar. Healthy fats include olive oil and canola oil. Ask your dietitian for more information about a healthy diet.       Other helpful guidelines:   Do not eat right before bedtime.  Stop eating at least 2 hours before bedtime.    Eat small, frequent meals.  Your stomach may tolerate small, frequent meals better than large meals.    ©  Copyright [employer] 2023 Information is for End User's use only and may not be sold, redistributed or otherwise used for commercial purposes.  The above information is an  only. It is not intended as medical advice for individual conditions or treatments. Talk to your doctor, nurse or pharmacist before following any medical regimen to see if it is safe and effective for you.

## 2024-07-03 PROBLEM — J01.40 ACUTE NON-RECURRENT PANSINUSITIS: Status: RESOLVED | Noted: 2023-06-27 | Resolved: 2024-07-03

## 2024-08-12 ENCOUNTER — CONSULT (OUTPATIENT)
Dept: GASTROENTEROLOGY | Facility: MEDICAL CENTER | Age: 69
End: 2024-08-12
Payer: COMMERCIAL

## 2024-08-12 ENCOUNTER — APPOINTMENT (OUTPATIENT)
Dept: LAB | Facility: MEDICAL CENTER | Age: 69
End: 2024-08-12
Payer: COMMERCIAL

## 2024-08-12 ENCOUNTER — TELEPHONE (OUTPATIENT)
Dept: GASTROENTEROLOGY | Facility: MEDICAL CENTER | Age: 69
End: 2024-08-12

## 2024-08-12 VITALS
TEMPERATURE: 96.6 F | HEART RATE: 72 BPM | HEIGHT: 64 IN | BODY MASS INDEX: 35.17 KG/M2 | OXYGEN SATURATION: 99 % | WEIGHT: 206 LBS | DIASTOLIC BLOOD PRESSURE: 70 MMHG | SYSTOLIC BLOOD PRESSURE: 107 MMHG

## 2024-08-12 DIAGNOSIS — K21.9 GASTROESOPHAGEAL REFLUX DISEASE WITHOUT ESOPHAGITIS: ICD-10-CM

## 2024-08-12 DIAGNOSIS — R13.10 DYSPHAGIA, UNSPECIFIED TYPE: ICD-10-CM

## 2024-08-12 DIAGNOSIS — R11.0 NAUSEA: ICD-10-CM

## 2024-08-12 DIAGNOSIS — R13.10 DYSPHAGIA, UNSPECIFIED TYPE: Primary | ICD-10-CM

## 2024-08-12 LAB — IGA SERPL-MCNC: 613 MG/DL (ref 66–433)

## 2024-08-12 PROCEDURE — 99204 OFFICE O/P NEW MOD 45 MIN: CPT | Performed by: NURSE PRACTITIONER

## 2024-08-12 PROCEDURE — 86364 TISS TRNSGLTMNASE EA IG CLAS: CPT

## 2024-08-12 PROCEDURE — 82784 ASSAY IGA/IGD/IGG/IGM EACH: CPT

## 2024-08-12 PROCEDURE — 36415 COLL VENOUS BLD VENIPUNCTURE: CPT

## 2024-08-12 RX ORDER — FAMOTIDINE 20 MG/1
20 TABLET, FILM COATED ORAL 2 TIMES DAILY
Qty: 90 TABLET | Refills: 3 | Status: SHIPPED | OUTPATIENT
Start: 2024-08-12

## 2024-08-12 RX ORDER — MELOXICAM 15 MG/1
15 TABLET ORAL DAILY
COMMUNITY
Start: 2024-06-23

## 2024-08-12 NOTE — H&P (VIEW-ONLY)
[facility] - Outpatient Consultation  Jessica Santamaria 68 y.o. female MRN: [id number]  Encounter: [id number]          ASSESSMENT AND PLAN:    Jessica Santamaria is a 68 y.o. female who presents with complaint of nausea, GERD and dysphagia.    1. Gastroesophageal reflux disease without esophagitis  2. Nausea  3.  Dysphagia    History of heartburn symptoms for 6 months that were slightly better with omeprazole 20 mg daily.  Also has esophageal dysphagia and nausea intermittently.  Dysphagia occurs with solids greater than liquids in the midesophagus.  Food and liquid advances on its own.  She awakens in the mornings with nausea.  No vomiting.  Is on meloxicam daily.  Rare caffeine use.  Rare alcohol use.  Thinks she may have had an EGD many years ago.  BMs are brown and formed daily.  Denies any melena hematochezia.  Weight has been stable.  Will rule out PUD, gastritis/esophagitis, EOE, H. pylori and celiac.    -Celiac panel, stool for H. pylori (off PPI for 2 weeks)  -Continue omeprazole 40 mg daily in the morning and add Pepcid 20 mg nightly.  Can take Pepcid 20 mg twice daily while off omeprazole.  -Antireflux diet  -Barium swallow to assess for stricture/narrowing  -EGD to rule out stricture, EOE, PUD, gastritis/esophagitis  -Follow-up in office after test    I obtained informed consent from the patient. The risks/benefits/alternatives of the procedure were discussed with the patient. Risks included, but not limited to, infection, bleeding, perforation, injury to organs in the abdomen, missed lesion and incomplete procedure were discussed. Patient was agreeable and electronic consent was signed.        ______________________________________________________________________    HPI:    Jessica Santamaria is a 68 y.o. female who presents with complaint of nausea, GERD and dysphagia.  She has past medical history of interstitial lung disease, Sjogren syndrome with lung involvement, GERD, osteoarthritis,  anticardiolipin antibody positive, vitamin D deficiency.    Presents with approximately 6 months of heartburn/reflux associated with intermittent bouts of nausea and dysphagia.  Cannot pinpoint triggers.  Rare caffeine.  Taking meloxicam daily.  Rare spicy foods.  No alcohol.  Reports nausea occurs in the morning on an empty stomach.  She was on omeprazole 20 mg daily which did help some of her symptoms for the past several months but recently it was increased to 40 mg daily.  She is feeling slightly better but is still having daily symptoms.  Also having intermittent bouts of esophageal dysphagia with solids greater than liquids.  Food/liquid advances on its own.  No weight loss.  No abdominal pain.     Labs during ER visit-CMP normal other than sodium 134 and creatinine 0.53, LFTs normal, CBC normal.      Previous EGD/colonoscopy    Colonoscopy 8/15-normal.  Repeat colonoscopy recommended in 10 years.    REVIEW OF SYSTEMS:    CONSTITUTIONAL: Denies any fever, chills, rigors, and weight loss.  HEENT: No earache or tinnitus. Denies hearing loss or visual disturbances.  CARDIOVASCULAR: No chest pain or palpitations.   RESPIRATORY: Denies any cough, hemoptysis, shortness of breath or dyspnea on exertion.  GASTROINTESTINAL: As noted in the History of Present Illness.   GENITOURINARY: No problems with urination. Denies any hematuria or dysuria.  NEUROLOGIC: No dizziness or vertigo, denies headaches.   MUSCULOSKELETAL: Denies any muscle or joint pain.   SKIN: Denies skin rashes or itching.   ENDOCRINE: Denies excessive thirst. Denies intolerance to heat or cold.  PSYCHOSOCIAL: Denies depression or anxiety. Denies any recent memory loss.       Historical Information   Past Medical History:   Diagnosis Date   • Allergic    • Allergic rhinitis     19DEC2014  RESOLVED   • Candidiasis of breast     30MAR2016 RESOLVED   • Diverticulosis    • Hemorrhoids    • Lung nodule    • Pneumonia    • PONV (postoperative nausea and  vomiting)    • Psoriatic arthritis (HCC)    • Sjogren's syndrome (HCC)     Followed by rheumatology - Dr. Rocha     Past Surgical History:   Procedure Laterality Date   • BREAST LUMPECTOMY Left    • COLONOSCOPY     • PA BRNCHSC INCL FLUOR GDNCE DX W/CELL WASHG SPX N/A 2017    Procedure: BRONCHOSCOPY FLEXIBLE;  Surgeon: Madan Blake MD;  Location: [facility];  Service: Pulmonary   • PA COLONOSCOPY FLX DX W/COLLJ SPEC WHEN PFRMD N/A 2016    Procedure: COLONOSCOPY;  Surgeon: Theresa Narvaez MD;  Location: [facility];  Service: General   • UMBILICAL HERNIA REPAIR       Social History   Social History     Substance and Sexual Activity   Alcohol Use No     Social History     Substance and Sexual Activity   Drug Use No     Social History     Tobacco Use   Smoking Status Former   • Current packs/day: 0.00   • Average packs/day: 0.5 packs/day for 15.0 years (7.5 ttl pk-yrs)   • Types: Cigarettes   • Start date:    • Quit date:    • Years since quittin.6   Smokeless Tobacco Never   Tobacco Comments    DENIED HISTORY OF TOBACCO USE     Family History   Problem Relation Age of Onset   • Hypertension Mother    • Hypothyroidism Mother    • Breast cancer Sister 42   • Arthritis Family        Meds/Allergies       Current Outpatient Medications:   •  albuterol (PROVENTIL HFA,VENTOLIN HFA) 90 mcg/act inhaler  •  ammonium lactate (LAC-HYDRIN) 12 % cream  •  Biotin 1 MG CAPS  •  desloratadine (CLARINEX) 5 MG tablet  •  famotidine (PEPCID) 20 mg tablet  •  ketoconazole (NIZORAL) 2 % cream  •  meloxicam (MOBIC) 15 mg tablet  •  montelukast (SINGULAIR) 10 mg tablet  •  Multiple Minerals-Vitamins (CALCIUM-MAGNESIUM-ZINC-D3) TABS  •  Multiple Vitamins-Minerals (MULTIVITAMIN WITH MINERALS) tablet  •  mycophenolate (CELLCEPT) 500 mg tablet  •  omega-3-acid ethyl esters (LOVAZA) 1 g capsule  •  omeprazole (PriLOSEC) 40 MG capsule  •  promethazine-dextromethorphan (PHENERGAN-DM) 6.25-15 mg/5 mL oral syrup  •  Specialty  "Vitamins Products (MAGNESIUM, AMINO ACID CHELATE,) 133 MG tablet  •  triamcinolone (KENALOG) 0.1 % oral topical paste  •  ibuprofen (MOTRIN) 800 mg tablet  •  ondansetron (ZOFRAN) 4 mg tablet    Allergies   Allergen Reactions   • Egg Yolk - Food Allergy Diarrhea     Egg Extract   • Aspirin GI Intolerance     nausea   • Sulfa Antibiotics Rash           Objective     Blood pressure 107/70, pulse 72, temperature (!) 96.6 °F (35.9 °C), temperature source Tympanic, height 5' 4\" (1.626 m), weight 93.4 kg (206 lb), SpO2 99%. Body mass index is 35.36 kg/m².        PHYSICAL EXAM:      General Appearance:   Alert, cooperative, no distress   HEENT:   Normocephalic, atraumatic, anicteric.     Neck:  Supple, symmetrical, trachea midline   Lungs:   Clear to auscultation bilaterally; no rales, rhonchi or wheezing; respirations unlabored    Heart::   Regular rate and rhythm; no murmur, rub, or gallop.   Abdomen:   Soft, non-tender, non-distended; normal bowel sounds; no masses, no organomegaly    Genitalia:   Deferred    Rectal:   Deferred    Extremities:  No cyanosis, clubbing or edema    Pulses:  2+ and symmetric    Skin:  No jaundice, rashes, or lesions    Lymph nodes:  No palpable cervical lymphadenopathy        Lab Results:   No visits with results within 1 Day(s) from this visit.   Latest known visit with results is:   Admission on 05/24/2024, Discharged on 05/24/2024   Component Date Value   • Sodium 05/24/2024 134 (L)    • Potassium 05/24/2024 3.5    • Chloride 05/24/2024 98    • CO2 05/24/2024 28    • ANION GAP 05/24/2024 8    • BUN 05/24/2024 12    • Creatinine 05/24/2024 0.53 (L)    • Glucose 05/24/2024 116    • Calcium 05/24/2024 9.0    • eGFR 05/24/2024 96    • WBC 05/24/2024 4.82    • RBC 05/24/2024 3.85    • Hemoglobin 05/24/2024 11.5    • Hematocrit 05/24/2024 35.4    • MCV 05/24/2024 92    • MCH 05/24/2024 29.9    • MCHC 05/24/2024 32.5    • RDW 05/24/2024 14.1    • MPV 05/24/2024 8.8 (L)    • Platelets 05/24/2024 " 194    • nRBC 05/24/2024 0    • Segmented % 05/24/2024 85 (H)    • Immature Grans % 05/24/2024 0    • Lymphocytes % 05/24/2024 9 (L)    • Monocytes % 05/24/2024 6    • Eosinophils Relative 05/24/2024 0    • Basophils Relative 05/24/2024 0    • Absolute Neutrophils 05/24/2024 4.06    • Absolute Immature Grans 05/24/2024 0.02    • Absolute Lymphocytes 05/24/2024 0.45 (L)    • Absolute Monocytes 05/24/2024 0.28    • Eosinophils Absolute 05/24/2024 0.00    • Basophils Absolute 05/24/2024 0.01    • Total Bilirubin 05/24/2024 0.51    • Bilirubin, Direct 05/24/2024 0.09    • Alkaline Phosphatase 05/24/2024 104    • AST 05/24/2024 24    • ALT 05/24/2024 15    • Total Protein 05/24/2024 8.3    • Albumin 05/24/2024 4.3    • hs TnI 0hr 05/24/2024 7    • SARS-CoV-2 05/24/2024 Negative    • INFLUENZA A PCR 05/24/2024 Positive (A)    • INFLUENZA B PCR 05/24/2024 Negative    • RSV PCR 05/24/2024 Negative    • Lipase 05/24/2024 29    • LACTIC ACID 05/24/2024 0.8    • Procalcitonin 05/24/2024 0.06    • Ventricular Rate 05/24/2024 80    • Atrial Rate 05/24/2024 80    • ME Interval 05/24/2024 174    • QRSD Interval 05/24/2024 92    • QT Interval 05/24/2024 390    • QTC Interval 05/24/2024 449    • P Axis 05/24/2024 43    • QRS Axis 05/24/2024 42    • T Wave Axis 05/24/2024 7        Lab Results   Component Value Date    WBC 4.82 05/24/2024    HGB 11.5 05/24/2024    HCT 35.4 05/24/2024    MCV 92 05/24/2024     05/24/2024       Lab Results   Component Value Date     03/16/2016    SODIUM 134 (L) 05/24/2024    K 3.5 05/24/2024    CL 98 05/24/2024    CO2 28 05/24/2024    ANIONGAP 6 05/05/2015    AGAP 8 05/24/2024    BUN 12 05/24/2024    CREATININE 0.53 (L) 05/24/2024    GLUC 116 05/24/2024    GLUF 89 06/19/2023    CALCIUM 9.0 05/24/2024    AST 24 05/24/2024    ALT 15 05/24/2024    ALKPHOS 104 05/24/2024    PROT 7.0 03/16/2016    TP 8.3 05/24/2024    BILITOT 0.4 03/16/2016    TBILI 0.51 05/24/2024    EGFR 96 05/24/2024       Lab  "Results   Component Value Date    CRP 1.9 04/11/2024       Lab Results   Component Value Date    YXN2LUPBNYZL 1.839 01/12/2021       No results found for: \"IRON\", \"TIBC\", \"FERRITIN\"    Radiology Results:   No results found.  "

## 2024-08-12 NOTE — PROGRESS NOTES
[facility] - Outpatient Consultation  Jessica Santamaria 68 y.o. female MRN: [id number]  Encounter: [id number]          ASSESSMENT AND PLAN:    Jessica Santamaria is a 68 y.o. female who presents with complaint of nausea, GERD and dysphagia.    1. Gastroesophageal reflux disease without esophagitis  2. Nausea  3.  Dysphagia    History of heartburn symptoms for 6 months that were slightly better with omeprazole 20 mg daily.  Also has esophageal dysphagia and nausea intermittently.  Dysphagia occurs with solids greater than liquids in the midesophagus.  Food and liquid advances on its own.  She awakens in the mornings with nausea.  No vomiting.  Is on meloxicam daily.  Rare caffeine use.  Rare alcohol use.  Thinks she may have had an EGD many years ago.  BMs are brown and formed daily.  Denies any melena hematochezia.  Weight has been stable.  Will rule out PUD, gastritis/esophagitis, EOE, H. pylori and celiac.    -Celiac panel, stool for H. pylori (off PPI for 2 weeks)  -Continue omeprazole 40 mg daily in the morning and add Pepcid 20 mg nightly.  Can take Pepcid 20 mg twice daily while off omeprazole.  -Antireflux diet  -Barium swallow to assess for stricture/narrowing  -EGD to rule out stricture, EOE, PUD, gastritis/esophagitis  -Follow-up in office after test    I obtained informed consent from the patient. The risks/benefits/alternatives of the procedure were discussed with the patient. Risks included, but not limited to, infection, bleeding, perforation, injury to organs in the abdomen, missed lesion and incomplete procedure were discussed. Patient was agreeable and electronic consent was signed.        ______________________________________________________________________    HPI:    Jessica Santamaria is a 68 y.o. female who presents with complaint of nausea, GERD and dysphagia.  She has past medical history of interstitial lung disease, Sjogren syndrome with lung involvement, GERD, osteoarthritis,  anticardiolipin antibody positive, vitamin D deficiency.    Presents with approximately 6 months of heartburn/reflux associated with intermittent bouts of nausea and dysphagia.  Cannot pinpoint triggers.  Rare caffeine.  Taking meloxicam daily.  Rare spicy foods.  No alcohol.  Reports nausea occurs in the morning on an empty stomach.  She was on omeprazole 20 mg daily which did help some of her symptoms for the past several months but recently it was increased to 40 mg daily.  She is feeling slightly better but is still having daily symptoms.  Also having intermittent bouts of esophageal dysphagia with solids greater than liquids.  Food/liquid advances on its own.  No weight loss.  No abdominal pain.     Labs during ER visit-CMP normal other than sodium 134 and creatinine 0.53, LFTs normal, CBC normal.      Previous EGD/colonoscopy    Colonoscopy 8/15-normal.  Repeat colonoscopy recommended in 10 years.    REVIEW OF SYSTEMS:    CONSTITUTIONAL: Denies any fever, chills, rigors, and weight loss.  HEENT: No earache or tinnitus. Denies hearing loss or visual disturbances.  CARDIOVASCULAR: No chest pain or palpitations.   RESPIRATORY: Denies any cough, hemoptysis, shortness of breath or dyspnea on exertion.  GASTROINTESTINAL: As noted in the History of Present Illness.   GENITOURINARY: No problems with urination. Denies any hematuria or dysuria.  NEUROLOGIC: No dizziness or vertigo, denies headaches.   MUSCULOSKELETAL: Denies any muscle or joint pain.   SKIN: Denies skin rashes or itching.   ENDOCRINE: Denies excessive thirst. Denies intolerance to heat or cold.  PSYCHOSOCIAL: Denies depression or anxiety. Denies any recent memory loss.       Historical Information   Past Medical History:   Diagnosis Date   • Allergic    • Allergic rhinitis     19DEC2014  RESOLVED   • Candidiasis of breast     30MAR2016 RESOLVED   • Diverticulosis    • Hemorrhoids    • Lung nodule    • Pneumonia    • PONV (postoperative nausea and  vomiting)    • Psoriatic arthritis (HCC)    • Sjogren's syndrome (HCC)     Followed by rheumatology - Dr. Rocha     Past Surgical History:   Procedure Laterality Date   • BREAST LUMPECTOMY Left    • COLONOSCOPY     • MD BRNCHSC INCL FLUOR GDNCE DX W/CELL WASHG SPX N/A 2017    Procedure: BRONCHOSCOPY FLEXIBLE;  Surgeon: Madan Blake MD;  Location: [facility];  Service: Pulmonary   • MD COLONOSCOPY FLX DX W/COLLJ SPEC WHEN PFRMD N/A 2016    Procedure: COLONOSCOPY;  Surgeon: Theresa Narvaez MD;  Location: [facility];  Service: General   • UMBILICAL HERNIA REPAIR       Social History   Social History     Substance and Sexual Activity   Alcohol Use No     Social History     Substance and Sexual Activity   Drug Use No     Social History     Tobacco Use   Smoking Status Former   • Current packs/day: 0.00   • Average packs/day: 0.5 packs/day for 15.0 years (7.5 ttl pk-yrs)   • Types: Cigarettes   • Start date:    • Quit date:    • Years since quittin.6   Smokeless Tobacco Never   Tobacco Comments    DENIED HISTORY OF TOBACCO USE     Family History   Problem Relation Age of Onset   • Hypertension Mother    • Hypothyroidism Mother    • Breast cancer Sister 42   • Arthritis Family        Meds/Allergies       Current Outpatient Medications:   •  albuterol (PROVENTIL HFA,VENTOLIN HFA) 90 mcg/act inhaler  •  ammonium lactate (LAC-HYDRIN) 12 % cream  •  Biotin 1 MG CAPS  •  desloratadine (CLARINEX) 5 MG tablet  •  famotidine (PEPCID) 20 mg tablet  •  ketoconazole (NIZORAL) 2 % cream  •  meloxicam (MOBIC) 15 mg tablet  •  montelukast (SINGULAIR) 10 mg tablet  •  Multiple Minerals-Vitamins (CALCIUM-MAGNESIUM-ZINC-D3) TABS  •  Multiple Vitamins-Minerals (MULTIVITAMIN WITH MINERALS) tablet  •  mycophenolate (CELLCEPT) 500 mg tablet  •  omega-3-acid ethyl esters (LOVAZA) 1 g capsule  •  omeprazole (PriLOSEC) 40 MG capsule  •  promethazine-dextromethorphan (PHENERGAN-DM) 6.25-15 mg/5 mL oral syrup  •  Specialty  "Vitamins Products (MAGNESIUM, AMINO ACID CHELATE,) 133 MG tablet  •  triamcinolone (KENALOG) 0.1 % oral topical paste  •  ibuprofen (MOTRIN) 800 mg tablet  •  ondansetron (ZOFRAN) 4 mg tablet    Allergies   Allergen Reactions   • Egg Yolk - Food Allergy Diarrhea     Egg Extract   • Aspirin GI Intolerance     nausea   • Sulfa Antibiotics Rash           Objective     Blood pressure 107/70, pulse 72, temperature (!) 96.6 °F (35.9 °C), temperature source Tympanic, height 5' 4\" (1.626 m), weight 93.4 kg (206 lb), SpO2 99%. Body mass index is 35.36 kg/m².        PHYSICAL EXAM:      General Appearance:   Alert, cooperative, no distress   HEENT:   Normocephalic, atraumatic, anicteric.     Neck:  Supple, symmetrical, trachea midline   Lungs:   Clear to auscultation bilaterally; no rales, rhonchi or wheezing; respirations unlabored    Heart::   Regular rate and rhythm; no murmur, rub, or gallop.   Abdomen:   Soft, non-tender, non-distended; normal bowel sounds; no masses, no organomegaly    Genitalia:   Deferred    Rectal:   Deferred    Extremities:  No cyanosis, clubbing or edema    Pulses:  2+ and symmetric    Skin:  No jaundice, rashes, or lesions    Lymph nodes:  No palpable cervical lymphadenopathy        Lab Results:   No visits with results within 1 Day(s) from this visit.   Latest known visit with results is:   Admission on 05/24/2024, Discharged on 05/24/2024   Component Date Value   • Sodium 05/24/2024 134 (L)    • Potassium 05/24/2024 3.5    • Chloride 05/24/2024 98    • CO2 05/24/2024 28    • ANION GAP 05/24/2024 8    • BUN 05/24/2024 12    • Creatinine 05/24/2024 0.53 (L)    • Glucose 05/24/2024 116    • Calcium 05/24/2024 9.0    • eGFR 05/24/2024 96    • WBC 05/24/2024 4.82    • RBC 05/24/2024 3.85    • Hemoglobin 05/24/2024 11.5    • Hematocrit 05/24/2024 35.4    • MCV 05/24/2024 92    • MCH 05/24/2024 29.9    • MCHC 05/24/2024 32.5    • RDW 05/24/2024 14.1    • MPV 05/24/2024 8.8 (L)    • Platelets 05/24/2024 " 194    • nRBC 05/24/2024 0    • Segmented % 05/24/2024 85 (H)    • Immature Grans % 05/24/2024 0    • Lymphocytes % 05/24/2024 9 (L)    • Monocytes % 05/24/2024 6    • Eosinophils Relative 05/24/2024 0    • Basophils Relative 05/24/2024 0    • Absolute Neutrophils 05/24/2024 4.06    • Absolute Immature Grans 05/24/2024 0.02    • Absolute Lymphocytes 05/24/2024 0.45 (L)    • Absolute Monocytes 05/24/2024 0.28    • Eosinophils Absolute 05/24/2024 0.00    • Basophils Absolute 05/24/2024 0.01    • Total Bilirubin 05/24/2024 0.51    • Bilirubin, Direct 05/24/2024 0.09    • Alkaline Phosphatase 05/24/2024 104    • AST 05/24/2024 24    • ALT 05/24/2024 15    • Total Protein 05/24/2024 8.3    • Albumin 05/24/2024 4.3    • hs TnI 0hr 05/24/2024 7    • SARS-CoV-2 05/24/2024 Negative    • INFLUENZA A PCR 05/24/2024 Positive (A)    • INFLUENZA B PCR 05/24/2024 Negative    • RSV PCR 05/24/2024 Negative    • Lipase 05/24/2024 29    • LACTIC ACID 05/24/2024 0.8    • Procalcitonin 05/24/2024 0.06    • Ventricular Rate 05/24/2024 80    • Atrial Rate 05/24/2024 80    • MN Interval 05/24/2024 174    • QRSD Interval 05/24/2024 92    • QT Interval 05/24/2024 390    • QTC Interval 05/24/2024 449    • P Axis 05/24/2024 43    • QRS Axis 05/24/2024 42    • T Wave Axis 05/24/2024 7        Lab Results   Component Value Date    WBC 4.82 05/24/2024    HGB 11.5 05/24/2024    HCT 35.4 05/24/2024    MCV 92 05/24/2024     05/24/2024       Lab Results   Component Value Date     03/16/2016    SODIUM 134 (L) 05/24/2024    K 3.5 05/24/2024    CL 98 05/24/2024    CO2 28 05/24/2024    ANIONGAP 6 05/05/2015    AGAP 8 05/24/2024    BUN 12 05/24/2024    CREATININE 0.53 (L) 05/24/2024    GLUC 116 05/24/2024    GLUF 89 06/19/2023    CALCIUM 9.0 05/24/2024    AST 24 05/24/2024    ALT 15 05/24/2024    ALKPHOS 104 05/24/2024    PROT 7.0 03/16/2016    TP 8.3 05/24/2024    BILITOT 0.4 03/16/2016    TBILI 0.51 05/24/2024    EGFR 96 05/24/2024       Lab  "Results   Component Value Date    CRP 1.9 04/11/2024       Lab Results   Component Value Date    EJC4VXRHHZJN 1.839 01/12/2021       No results found for: \"IRON\", \"TIBC\", \"FERRITIN\"    Radiology Results:   No results found.  "

## 2024-08-12 NOTE — TELEPHONE ENCOUNTER
Procedure: EGD  Date: 09/16/2024  Physician performing: Dr. Styles  Location of procedure:  [place]  Instructions given to patient: EGD  Diabetic: N/A  Clearances: N/A

## 2024-08-13 ENCOUNTER — APPOINTMENT (OUTPATIENT)
Dept: LAB | Facility: HOSPITAL | Age: 69
End: 2024-08-13
Payer: COMMERCIAL

## 2024-08-13 DIAGNOSIS — R11.0 NAUSEA: ICD-10-CM

## 2024-08-13 DIAGNOSIS — R13.10 DYSPHAGIA, UNSPECIFIED TYPE: ICD-10-CM

## 2024-08-13 DIAGNOSIS — K21.9 GASTROESOPHAGEAL REFLUX DISEASE WITHOUT ESOPHAGITIS: ICD-10-CM

## 2024-08-13 LAB — TTG IGA SER-ACNC: <2 U/ML (ref 0–3)

## 2024-08-13 PROCEDURE — 87338 HPYLORI STOOL AG IA: CPT

## 2024-08-14 ENCOUNTER — TELEPHONE (OUTPATIENT)
Dept: GASTROENTEROLOGY | Facility: MEDICAL CENTER | Age: 69
End: 2024-08-14

## 2024-08-14 NOTE — TELEPHONE ENCOUNTER
Called pt and left a VM indicating we are reaching out with a non-urgent test result that can also be viewed on [facility] and to call the office for the result or any questions or concerns.    ----- Message from SARKIS Poe sent at 8/14/2024  7:47 AM EDT -----  I am happy to report that your celiac testing was negative.

## 2024-08-15 LAB — H PYLORI AG STL QL IA: NEGATIVE

## 2024-08-16 DIAGNOSIS — J31.0 CHRONIC RHINITIS: ICD-10-CM

## 2024-08-16 RX ORDER — MONTELUKAST SODIUM 10 MG/1
TABLET ORAL
Qty: 90 TABLET | Refills: 1 | Status: SHIPPED | OUTPATIENT
Start: 2024-08-16

## 2024-08-30 ENCOUNTER — HOSPITAL ENCOUNTER (OUTPATIENT)
Dept: RADIOLOGY | Facility: HOSPITAL | Age: 69
Discharge: HOME | End: 2024-08-30
Payer: COMMERCIAL

## 2024-08-30 DIAGNOSIS — R13.10 DYSPHAGIA, UNSPECIFIED TYPE: ICD-10-CM

## 2024-08-30 PROCEDURE — 74220 X-RAY XM ESOPHAGUS 1CNTRST: CPT

## 2024-09-05 RX ORDER — SODIUM CHLORIDE 9 MG/ML
125 INJECTION, SOLUTION INTRAVENOUS CONTINUOUS
Status: CANCELLED | OUTPATIENT
Start: 2024-09-05

## 2024-09-05 RX ORDER — ONDANSETRON 2 MG/ML
4 INJECTION INTRAMUSCULAR; INTRAVENOUS ONCE AS NEEDED
Status: CANCELLED | OUTPATIENT
Start: 2024-09-05

## 2024-09-06 ENCOUNTER — ANESTHESIA (OUTPATIENT)
Dept: GASTROENTEROLOGY | Facility: HOSPITAL | Age: 69
End: 2024-09-06
Payer: COMMERCIAL

## 2024-09-06 ENCOUNTER — ANESTHESIA EVENT (OUTPATIENT)
Dept: GASTROENTEROLOGY | Facility: HOSPITAL | Age: 69
End: 2024-09-06
Payer: COMMERCIAL

## 2024-09-06 ENCOUNTER — HOSPITAL ENCOUNTER (OUTPATIENT)
Dept: GASTROENTEROLOGY | Facility: HOSPITAL | Age: 69
Setting detail: OUTPATIENT SURGERY
End: 2024-09-06
Payer: COMMERCIAL

## 2024-09-06 VITALS
BODY MASS INDEX: 35.17 KG/M2 | HEART RATE: 77 BPM | TEMPERATURE: 98 F | OXYGEN SATURATION: 98 % | DIASTOLIC BLOOD PRESSURE: 54 MMHG | RESPIRATION RATE: 16 BRPM | HEIGHT: 64 IN | SYSTOLIC BLOOD PRESSURE: 109 MMHG | WEIGHT: 206 LBS

## 2024-09-06 DIAGNOSIS — R11.0 NAUSEA: ICD-10-CM

## 2024-09-06 DIAGNOSIS — R13.10 DYSPHAGIA, UNSPECIFIED TYPE: ICD-10-CM

## 2024-09-06 DIAGNOSIS — K21.9 GASTROESOPHAGEAL REFLUX DISEASE WITHOUT ESOPHAGITIS: ICD-10-CM

## 2024-09-06 PROCEDURE — C1726 CATH, BAL DIL, NON-VASCULAR: HCPCS

## 2024-09-06 PROCEDURE — 43249 ESOPH EGD DILATION <30 MM: CPT | Performed by: INTERNAL MEDICINE

## 2024-09-06 PROCEDURE — 88305 TISSUE EXAM BY PATHOLOGIST: CPT | Performed by: PATHOLOGY

## 2024-09-06 PROCEDURE — 43239 EGD BIOPSY SINGLE/MULTIPLE: CPT | Performed by: INTERNAL MEDICINE

## 2024-09-06 RX ORDER — ONDANSETRON 2 MG/ML
4 INJECTION INTRAMUSCULAR; INTRAVENOUS ONCE AS NEEDED
Status: DISCONTINUED | OUTPATIENT
Start: 2024-09-06 | End: 2024-09-10 | Stop reason: HOSPADM

## 2024-09-06 RX ORDER — SODIUM CHLORIDE 9 MG/ML
INJECTION, SOLUTION INTRAVENOUS CONTINUOUS PRN
Status: DISCONTINUED | OUTPATIENT
Start: 2024-09-06 | End: 2024-09-06

## 2024-09-06 RX ORDER — LIDOCAINE HYDROCHLORIDE 20 MG/ML
INJECTION, SOLUTION EPIDURAL; INFILTRATION; INTRACAUDAL; PERINEURAL AS NEEDED
Status: DISCONTINUED | OUTPATIENT
Start: 2024-09-06 | End: 2024-09-06

## 2024-09-06 RX ORDER — SODIUM CHLORIDE 9 MG/ML
125 INJECTION, SOLUTION INTRAVENOUS CONTINUOUS
Status: DISCONTINUED | OUTPATIENT
Start: 2024-09-06 | End: 2024-09-10 | Stop reason: HOSPADM

## 2024-09-06 RX ORDER — PROPOFOL 10 MG/ML
INJECTION, EMULSION INTRAVENOUS AS NEEDED
Status: DISCONTINUED | OUTPATIENT
Start: 2024-09-06 | End: 2024-09-06

## 2024-09-06 RX ADMIN — LIDOCAINE HYDROCHLORIDE 100 MG: 20 INJECTION, SOLUTION EPIDURAL; INFILTRATION; INTRACAUDAL at 09:48

## 2024-09-06 RX ADMIN — PROPOFOL 70 MCG/KG/MIN: 10 INJECTION, EMULSION INTRAVENOUS at 09:49

## 2024-09-06 RX ADMIN — SODIUM CHLORIDE: 0.9 INJECTION, SOLUTION INTRAVENOUS at 09:48

## 2024-09-06 RX ADMIN — PROPOFOL 170 MG: 10 INJECTION, EMULSION INTRAVENOUS at 09:48

## 2024-09-06 RX ADMIN — SODIUM CHLORIDE 125 ML/HR: 0.9 INJECTION, SOLUTION INTRAVENOUS at 09:45

## 2024-09-06 NOTE — ANESTHESIA PREPROCEDURE EVALUATION
Procedure:  EGD    Relevant Problems   ANESTHESIA   (+) PONV (postoperative nausea and vomiting)      GI/HEPATIC   (+) Gastroesophageal reflux disease without esophagitis      MUSCULOSKELETAL   (+) Primary osteoarthritis of both knees      Rheumatology   (+) ILD (interstitial lung disease) ([facility])      Other   (+) MALINI positive   (+) Abnormal PFTs   (+) Class 2 severe obesity due to excess calories with serious comorbidity and body mass index (BMI) of 36.0 to 36.9 in adult ([facility])      1/2024  Echo:    Left Ventricle: Left ventricle is normal in size. Wall thickness is   normal. Systolic function is normal with an ejection fraction of 60-65%.   Wall motion is within normal limits. There is normal diastolic function.     Right Ventricle: Right ventricle cavity is normal. Systolic function is   normal.    Aortic Valve: The aortic valve is trileaflet. The leaflets are mildly   calcified. There is mild regurgitation. There is no evidence of aortic   valve stenosis.     Pulmonic Valve: Normal pulmonary artery pressure.     Physical Exam    Airway    Mallampati score: I  TM Distance: <3 FB       Dental   No notable dental hx     Cardiovascular  Rhythm: regular, Rate: normal    Pulmonary   Breath sounds clear to auscultation    Other Findings  post-pubertal.      Anesthesia Plan  ASA Score- 2     Anesthesia Type- IV sedation with anesthesia with ASA Monitors.         Additional Monitors:     Airway Plan:     Comment: GA PRN.       Plan Factors-Exercise tolerance (METS): >4 METS.    Chart reviewed.   Existing labs reviewed.     Patient is not a current smoker.              Induction- intravenous.    Postoperative Plan-     Perioperative Resuscitation Plan - Level 1 - Full Code.       Informed Consent- Anesthetic plan and risks discussed with patient.

## 2024-09-06 NOTE — ANESTHESIA POSTPROCEDURE EVALUATION
"Post-Op Assessment Note    CV Status:  Stable    Pain management: adequate       Mental Status:  Awake   Hydration Status:  Stable   PONV Controlled:  Controlled   Airway Patency:  Patent     Post Op Vitals Reviewed: Yes    No anethesia notable event occurred.    Staff: Anesthesiologist           /54   Pulse 77   Temp 98 °F (36.7 °C) (Temporal)   Resp 16   Ht 5' 4\" (1.626 m)   Wt 93.4 kg (206 lb)   SpO2 98%   BMI 35.36 kg/m²       BP      Temp      Pulse     Resp      SpO2        "

## 2024-09-10 PROCEDURE — 88305 TISSUE EXAM BY PATHOLOGIST: CPT | Performed by: PATHOLOGY

## 2024-09-18 DIAGNOSIS — R11.0 NAUSEA: ICD-10-CM

## 2024-09-18 DIAGNOSIS — K21.9 GASTROESOPHAGEAL REFLUX DISEASE WITHOUT ESOPHAGITIS: ICD-10-CM

## 2024-09-18 DIAGNOSIS — R13.10 DYSPHAGIA, UNSPECIFIED TYPE: ICD-10-CM

## 2024-09-18 RX ORDER — FAMOTIDINE 20 MG/1
20 TABLET, FILM COATED ORAL 2 TIMES DAILY
Qty: 180 TABLET | Refills: 1 | Status: SHIPPED | OUTPATIENT
Start: 2024-09-18

## 2024-12-02 ENCOUNTER — TELEPHONE (OUTPATIENT)
Age: 69
End: 2024-12-02

## 2024-12-02 NOTE — TELEPHONE ENCOUNTER
Last visit 06/03/2024  No upcoming appt     Patient is complaining of abdominal pain, vaginal pain and discomfort. Patient is looking for an appt this week with Dr Dos Santos. Patient only wants to visit with her PCP. Patient prefers female providers. Please advise.

## 2024-12-02 NOTE — TELEPHONE ENCOUNTER
Scheduled appt for pt, she chose thurs to wait for dr maier to be available. Will keep watching for a cancellation , will call her if anything opens up sooner

## 2024-12-05 ENCOUNTER — OFFICE VISIT (OUTPATIENT)
Dept: FAMILY MEDICINE CLINIC | Facility: CLINIC | Age: 69
End: 2024-12-05
Payer: COMMERCIAL

## 2024-12-05 VITALS
BODY MASS INDEX: 35.92 KG/M2 | OXYGEN SATURATION: 97 % | SYSTOLIC BLOOD PRESSURE: 120 MMHG | TEMPERATURE: 97.8 F | HEART RATE: 103 BPM | WEIGHT: 210.4 LBS | DIASTOLIC BLOOD PRESSURE: 82 MMHG | HEIGHT: 64 IN

## 2024-12-05 DIAGNOSIS — R10.2 PELVIC PAIN: Primary | ICD-10-CM

## 2024-12-05 DIAGNOSIS — R35.0 URINARY FREQUENCY: ICD-10-CM

## 2024-12-05 DIAGNOSIS — N81.4 CYSTOCELE WITH PROLAPSE: ICD-10-CM

## 2024-12-05 LAB
SL AMB  POCT GLUCOSE, UA: ABNORMAL
SL AMB LEUKOCYTE ESTERASE,UA: ABNORMAL
SL AMB POCT BILIRUBIN,UA: ABNORMAL
SL AMB POCT BLOOD,UA: ABNORMAL
SL AMB POCT CLARITY,UA: CLEAR
SL AMB POCT COLOR,UA: YELLOW
SL AMB POCT KETONES,UA: ABNORMAL
SL AMB POCT NITRITE,UA: ABNORMAL
SL AMB POCT PH,UA: 7
SL AMB POCT SPECIFIC GRAVITY,UA: 1.01
SL AMB POCT URINE PROTEIN: ABNORMAL
SL AMB POCT UROBILINOGEN: 0.2

## 2024-12-05 PROCEDURE — G2211 COMPLEX E/M VISIT ADD ON: HCPCS | Performed by: FAMILY MEDICINE

## 2024-12-05 PROCEDURE — 99214 OFFICE O/P EST MOD 30 MIN: CPT | Performed by: FAMILY MEDICINE

## 2024-12-05 PROCEDURE — 87086 URINE CULTURE/COLONY COUNT: CPT | Performed by: FAMILY MEDICINE

## 2024-12-05 PROCEDURE — 81002 URINALYSIS NONAUTO W/O SCOPE: CPT | Performed by: FAMILY MEDICINE

## 2024-12-05 NOTE — PROGRESS NOTES
Name: Jessica Santamaria      : 1955      MRN: [id number]  Encounter Provider: Patricia Dos Santos DO  Encounter Date: 2024   Encounter department: [facility] PRIMARY CARE  :  Assessment & Plan  Pelvic pain  Patient to see GYN will change to tylenol for pain   Orders:    Ambulatory Referral to Obstetrics / Gynecology; Future    Cystocele with prolapse    Orders:    Ambulatory Referral to Obstetrics / Gynecology; Future    Urinary frequency    Orders:    Ambulatory Referral to Obstetrics / Gynecology; Future           History of Present Illness   Chief Complaint   Patient presents with    Abdominal Pain     Lower right abdominal pain x 4-5 days   Taking ibuprofen       69-y/o F who presents with a 4-5 day hx of RLQ pain that radiates into her R lower back. Pain is intermittent in nature. Patient taking IBU 800mg by mouth QD for pain control.Patient reports 3 day hx of constipation however, now having normal BM's. Patient has a med hx significant for GERD, Sjogren's, osteoarthritis, and ILD. Patient had recent labs performed and her Vitamin D was elevated. Per patient, she was taking an OTC vitamin with Vit D3 added. Patient was advised by rheumatology to discontinue d/t elevated Vit D levels. Patient showed me a picture of her vaginal area showing a prolapse     Abdominal Pain  This is a recurrent problem. The current episode started in the past 7 days. The onset quality is sudden. The problem occurs intermittently. The most recent episode lasted -1 days. The problem has been waxing and waning. Pain location: mid lower abdomen. The pain is at a severity of 6/10. The pain is severe. The quality of the pain is cramping and sharp. The abdominal pain radiates to the right flank. Associated symptoms include constipation and frequency. The pain is aggravated by certain positions. Relieved by: NSAID. Treatments tried: IBU. The treatment provided mild relief. Prior diagnostic workup includes lower endoscopy  "and upper endoscopy. Her past medical history is significant for GERD. There is no history of abdominal surgery, colon cancer, Crohn's disease, gallstones, irritable bowel syndrome, pancreatitis, PUD or ulcerative colitis.     Review of Systems   Constitutional: Negative.    HENT: Negative.     Eyes: Negative.    Respiratory: Negative.     Cardiovascular: Negative.    Gastrointestinal:  Positive for abdominal pain and constipation.   Endocrine: Negative.    Genitourinary:  Positive for frequency.   Musculoskeletal:  Positive for back pain.   Skin: Negative.    Allergic/Immunologic: Negative.    Neurological: Negative.    Hematological: Negative.    Psychiatric/Behavioral: Negative.            Objective   /82   Pulse 103   Temp 97.8 °F (36.6 °C)   Ht 5' 4\" (1.626 m)   Wt 95.4 kg (210 lb 6.4 oz)   SpO2 97%   BMI 36.12 kg/m²      Physical Exam  Vitals reviewed.   Constitutional:       Appearance: She is well-developed.   HENT:      Head: Normocephalic and atraumatic.   Cardiovascular:      Rate and Rhythm: Normal rate and regular rhythm.   Pulmonary:      Effort: Pulmonary effort is normal.      Breath sounds: Normal breath sounds.   Abdominal:      General: Abdomen is flat. Bowel sounds are normal.      Tenderness: There is abdominal tenderness in the right lower quadrant.   Genitourinary:     Adnexa: Right adnexa normal.   Skin:     General: Skin is warm and dry.   Neurological:      General: No focal deficit present.      Mental Status: She is alert and oriented to person, place, and time.   Psychiatric:         Mood and Affect: Mood normal.         Behavior: Behavior normal.         "

## 2024-12-05 NOTE — ASSESSMENT & PLAN NOTE
Patient to see GYN will change to tylenol for pain   Orders:    Ambulatory Referral to Obstetrics / Gynecology; Future

## 2024-12-07 LAB — BACTERIA UR CULT: NORMAL

## 2024-12-08 ENCOUNTER — RESULTS FOLLOW-UP (OUTPATIENT)
Dept: FAMILY MEDICINE CLINIC | Facility: CLINIC | Age: 69
End: 2024-12-08

## 2024-12-09 NOTE — TELEPHONE ENCOUNTER
----- Message from Patricia Dos Santos DO sent at 12/8/2024  4:36 PM EST -----  Call patient urine culture is normal

## 2024-12-20 ENCOUNTER — APPOINTMENT (EMERGENCY)
Dept: CT IMAGING | Facility: HOSPITAL | Age: 69
End: 2024-12-20
Payer: COMMERCIAL

## 2024-12-20 ENCOUNTER — HOSPITAL ENCOUNTER (EMERGENCY)
Facility: HOSPITAL | Age: 69
Discharge: HOME | End: 2024-12-20
Attending: EMERGENCY MEDICINE
Payer: COMMERCIAL

## 2024-12-20 VITALS
BODY MASS INDEX: 33.99 KG/M2 | WEIGHT: 199.08 LBS | RESPIRATION RATE: 14 BRPM | SYSTOLIC BLOOD PRESSURE: 123 MMHG | OXYGEN SATURATION: 98 % | HEIGHT: 64 IN | DIASTOLIC BLOOD PRESSURE: 59 MMHG | TEMPERATURE: 97.4 F | HEART RATE: 68 BPM

## 2024-12-20 DIAGNOSIS — K59.00 CONSTIPATION: ICD-10-CM

## 2024-12-20 DIAGNOSIS — R10.9 ACUTE ABDOMINAL PAIN: Primary | ICD-10-CM

## 2024-12-20 DIAGNOSIS — R55 SYNCOPE: ICD-10-CM

## 2024-12-20 DIAGNOSIS — J84.9 ILD (INTERSTITIAL LUNG DISEASE) (HCC): ICD-10-CM

## 2024-12-20 LAB
2HR DELTA HS TROPONIN: -3 NG/L
ALBUMIN SERPL BCG-MCNC: 4.5 G/DL (ref 3.5–5)
ALP SERPL-CCNC: 94 U/L (ref 34–104)
ALT SERPL W P-5'-P-CCNC: 12 U/L (ref 7–52)
ANION GAP SERPL CALCULATED.3IONS-SCNC: 7 MMOL/L (ref 4–13)
AST SERPL W P-5'-P-CCNC: 21 U/L (ref 13–39)
ATRIAL RATE: 56 BPM
ATRIAL RATE: 60 BPM
BASOPHILS # BLD AUTO: 0.01 THOUSANDS/ΜL (ref 0–0.1)
BASOPHILS NFR BLD AUTO: 0 % (ref 0–1)
BILIRUB SERPL-MCNC: 0.55 MG/DL (ref 0.2–1)
BUN SERPL-MCNC: 15 MG/DL (ref 5–25)
CALCIUM SERPL-MCNC: 9.6 MG/DL (ref 8.4–10.2)
CARDIAC TROPONIN I PNL SERPL HS: 3 NG/L (ref ?–50)
CARDIAC TROPONIN I PNL SERPL HS: 6 NG/L (ref ?–50)
CHLORIDE SERPL-SCNC: 101 MMOL/L (ref 96–108)
CO2 SERPL-SCNC: 29 MMOL/L (ref 21–32)
CREAT SERPL-MCNC: 0.58 MG/DL (ref 0.6–1.3)
EOSINOPHIL # BLD AUTO: 0.01 THOUSAND/ΜL (ref 0–0.61)
EOSINOPHIL NFR BLD AUTO: 0 % (ref 0–6)
ERYTHROCYTE [DISTWIDTH] IN BLOOD BY AUTOMATED COUNT: 13.4 % (ref 11.6–15.1)
GFR SERPL CREATININE-BSD FRML MDRD: 93 ML/MIN/1.73SQ M
GLUCOSE SERPL-MCNC: 94 MG/DL (ref 65–140)
HCT VFR BLD AUTO: 40.5 % (ref 34.8–46.1)
HGB BLD-MCNC: 13 G/DL (ref 11.5–15.4)
IMM GRANULOCYTES # BLD AUTO: 0.01 THOUSAND/UL (ref 0–0.2)
IMM GRANULOCYTES NFR BLD AUTO: 0 % (ref 0–2)
LIPASE SERPL-CCNC: 17 U/L (ref 11–82)
LYMPHOCYTES # BLD AUTO: 0.7 THOUSANDS/ΜL (ref 0.6–4.47)
LYMPHOCYTES NFR BLD AUTO: 19 % (ref 14–44)
MCH RBC QN AUTO: 30.2 PG (ref 26.8–34.3)
MCHC RBC AUTO-ENTMCNC: 32.1 G/DL (ref 31.4–37.4)
MCV RBC AUTO: 94 FL (ref 82–98)
MONOCYTES # BLD AUTO: 0.34 THOUSAND/ΜL (ref 0.17–1.22)
MONOCYTES NFR BLD AUTO: 9 % (ref 4–12)
NEUTROPHILS # BLD AUTO: 2.68 THOUSANDS/ΜL (ref 1.85–7.62)
NEUTS SEG NFR BLD AUTO: 72 % (ref 43–75)
NRBC BLD AUTO-RTO: 0 /100 WBCS
P AXIS: 47 DEGREES
P AXIS: 61 DEGREES
PLATELET # BLD AUTO: 204 THOUSANDS/UL (ref 149–390)
PMV BLD AUTO: 8.9 FL (ref 8.9–12.7)
POTASSIUM SERPL-SCNC: 4.2 MMOL/L (ref 3.5–5.3)
PR INTERVAL: 174 MS
PR INTERVAL: 186 MS
PROT SERPL-MCNC: 8.7 G/DL (ref 6.4–8.4)
QRS AXIS: 37 DEGREES
QRS AXIS: 70 DEGREES
QRSD INTERVAL: 88 MS
QRSD INTERVAL: 92 MS
QT INTERVAL: 428 MS
QT INTERVAL: 438 MS
QTC INTERVAL: 422 MS
QTC INTERVAL: 428 MS
RBC # BLD AUTO: 4.31 MILLION/UL (ref 3.81–5.12)
SODIUM SERPL-SCNC: 137 MMOL/L (ref 135–147)
T WAVE AXIS: 27 DEGREES
T WAVE AXIS: 33 DEGREES
VENTRICULAR RATE: 56 BPM
VENTRICULAR RATE: 60 BPM
WBC # BLD AUTO: 3.75 THOUSAND/UL (ref 4.31–10.16)

## 2024-12-20 PROCEDURE — 84484 ASSAY OF TROPONIN QUANT: CPT | Performed by: EMERGENCY MEDICINE

## 2024-12-20 PROCEDURE — 99285 EMERGENCY DEPT VISIT HI MDM: CPT | Performed by: EMERGENCY MEDICINE

## 2024-12-20 PROCEDURE — 74177 CT ABD & PELVIS W/CONTRAST: CPT

## 2024-12-20 PROCEDURE — 93010 ELECTROCARDIOGRAM REPORT: CPT | Performed by: INTERNAL MEDICINE

## 2024-12-20 PROCEDURE — 83690 ASSAY OF LIPASE: CPT | Performed by: EMERGENCY MEDICINE

## 2024-12-20 PROCEDURE — 85025 COMPLETE CBC W/AUTO DIFF WBC: CPT | Performed by: EMERGENCY MEDICINE

## 2024-12-20 PROCEDURE — 96361 HYDRATE IV INFUSION ADD-ON: CPT

## 2024-12-20 PROCEDURE — 36415 COLL VENOUS BLD VENIPUNCTURE: CPT | Performed by: EMERGENCY MEDICINE

## 2024-12-20 PROCEDURE — 99284 EMERGENCY DEPT VISIT MOD MDM: CPT

## 2024-12-20 PROCEDURE — 93005 ELECTROCARDIOGRAM TRACING: CPT

## 2024-12-20 PROCEDURE — 96374 THER/PROPH/DIAG INJ IV PUSH: CPT

## 2024-12-20 PROCEDURE — 80053 COMPREHEN METABOLIC PANEL: CPT | Performed by: EMERGENCY MEDICINE

## 2024-12-20 PROCEDURE — 70450 CT HEAD/BRAIN W/O DYE: CPT

## 2024-12-20 RX ORDER — ONDANSETRON 2 MG/ML
4 INJECTION INTRAMUSCULAR; INTRAVENOUS ONCE
Status: COMPLETED | OUTPATIENT
Start: 2024-12-20 | End: 2024-12-20

## 2024-12-20 RX ORDER — FENTANYL CITRATE 50 UG/ML
50 INJECTION, SOLUTION INTRAMUSCULAR; INTRAVENOUS ONCE
Refills: 0 | Status: DISCONTINUED | OUTPATIENT
Start: 2024-12-20 | End: 2024-12-20 | Stop reason: HOSPADM

## 2024-12-20 RX ORDER — HYOSCYAMINE SULFATE 0.125 MG
0.12 TABLET ORAL EVERY 4 HOURS PRN
Qty: 30 TABLET | Refills: 0 | Status: SHIPPED | OUTPATIENT
Start: 2024-12-20

## 2024-12-20 RX ORDER — ONDANSETRON 4 MG/1
4 TABLET, FILM COATED ORAL EVERY 8 HOURS PRN
Qty: 7 TABLET | Refills: 0 | Status: SHIPPED | OUTPATIENT
Start: 2024-12-20

## 2024-12-20 RX ADMIN — ONDANSETRON 4 MG: 2 INJECTION INTRAMUSCULAR; INTRAVENOUS at 12:28

## 2024-12-20 RX ADMIN — IOHEXOL 100 ML: 350 INJECTION, SOLUTION INTRAVENOUS at 12:47

## 2024-12-20 RX ADMIN — SODIUM CHLORIDE 1000 ML: 0.9 INJECTION, SOLUTION INTRAVENOUS at 12:31

## 2024-12-20 NOTE — ED PROVIDER NOTES
Time reflects when diagnosis was documented in both MDM as applicable and the Disposition within this note       Time User Action Codes Description Comment    12/20/2024  2:41 PM Aneesh Wilson Add [R10.9] Acute abdominal pain     12/20/2024  2:41 PM Aneesh Wilson [R55] Syncope     12/20/2024  2:41 PM Aneesh Wilson Add [K59.00] Constipation     12/20/2024  2:41 PM Aneesh Wilson Add [J84.9] ILD (interstitial lung disease) ([facility])           ED Disposition       ED Disposition   Discharge    Condition   Stable    Date/Time   Fri Dec 20, 2024  2:41 PM    Comment   Jessica Santamaria discharge to home/self care.                   Assessment & Plan       Medical Decision Making  Acute abdominal pain-will do EKG/troponin noticeable presentation of acute coronary syndrome, dysrhythmia, labs showed hepatitis and pancreatitis, acute kidney or, lecture abnormality, biliary pathology, CT abdomen pelvis with acute surgical pathology such as but not limited to cholecystitis, small bowel obstruction, perforated viscus, diverticulitis, urine dip for UTI, as needed pain meds, reassess.    Syncope and fall-will do CT head no acute CNS pathology such as bleed, skull fracture, acute about dysrhythmia, doubt pulmonary embolism and PE study is not indicated.    Amount and/or Complexity of Data Reviewed  Labs: ordered.  Radiology: ordered.    Risk  Prescription drug management.        ED Course as of 12/20/24 1459   Fri Dec 20, 2024   1440 Medical and trauma workups are reviewed and benign.  Will treat for constipation, discharge to home with outpatient follow-up.       Medications   fentaNYL injection 50 mcg (50 mcg Intravenous Not Given 12/20/24 1233)   sodium chloride 0.9 % bolus 1,000 mL (0 mL Intravenous Stopped 12/20/24 1356)   ondansetron (ZOFRAN) injection 4 mg (4 mg Intravenous Given 12/20/24 1228)   iohexol (OMNIPAQUE) 350 MG/ML injection (SINGLE-DOSE) 100 mL (100 mL Intravenous Given 12/20/24 1247)       ED Risk Strat Scores                           SBIRT 20yo+      Flowsheet Row Most Recent Value   Initial Alcohol Screen: US AUDIT-C     1. How often do you have a drink containing alcohol? 0 Filed at: 12/20/2024 1131   2. How many drinks containing alcohol do you have on a typical day you are drinking?  0 Filed at: 12/20/2024 1131   3b. FEMALE Any Age, or MALE 65+: How often do you have 4 or more drinks on one occassion? 0 Filed at: 12/20/2024 1131   Audit-C Score 0 Filed at: 12/20/2024 1131   PETE: How many times in the past year have you...    Used an illegal drug or used a prescription medication for non-medical reasons? Never Filed at: 12/20/2024 1131                            History of Present Illness       Chief Complaint   Patient presents with    Abdominal Pain     Pt reports RUQ pain for a couple of week that radiates to her back, some nausea. Today woke up and felt dizzy, sweaty. Denies other symptoms at this time.     Dizziness       Past Medical History:   Diagnosis Date    Allergic     Allergic rhinitis     [id number]  RESOLVED    Candidiasis of breast     30MAR2016 RESOLVED    Diverticulosis     Hemorrhoids     Lung nodule     Pneumonia     PONV (postoperative nausea and vomiting)     Psoriatic arthritis (HCC)     Sjogren's syndrome (HCC)     Followed by rheumatology - Dr. Rocha      Past Surgical History:   Procedure Laterality Date    BREAST LUMPECTOMY Left     COLONOSCOPY      DC BRNCHSC INCL FLUOR GDNCE DX W/CELL WASHG SPX N/A 2/8/2017    Procedure: BRONCHOSCOPY FLEXIBLE;  Surgeon: Madan Blake MD;  Location: [facility];  Service: Pulmonary    DC COLONOSCOPY FLX DX W/COLLJ SPEC WHEN PFRMD N/A 8/16/2016    Procedure: COLONOSCOPY;  Surgeon: Theresa Narvaez MD;  Location: [facility];  Service: General    UMBILICAL HERNIA REPAIR        Family History   Problem Relation Age of Onset    Hypertension Mother     Hypothyroidism Mother     Breast cancer Sister 42    Arthritis Family       Social History     Tobacco Use     Smoking status: Former     Current packs/day: 0.00     Average packs/day: 0.5 packs/day for 15.0 years (7.5 ttl pk-yrs)     Types: Cigarettes     Start date:      Quit date:      Years since quittin.0    Smokeless tobacco: Never    Tobacco comments:     DENIED HISTORY OF TOBACCO USE   Vaping Use    Vaping status: Never Used   Substance Use Topics    Alcohol use: No    Drug use: No      E-Cigarette/Vaping    E-Cigarette Use Never User       E-Cigarette/Vaping Substances    Nicotine No     THC No     CBD No     Flavoring No     Other No     Unknown No       I have reviewed and agree with the history as documented.     69-year-old female presents valuation of complaints per patient planes of sharp right upper quadrant/right flank pain for the past week.  It is constant, waxing waning in intensity, without modifying factors.  Associate with nausea.  Patient states when she stood up earlier today she felt lightheaded causing her to pass out.  She reports feeling nauseous during this.  Patient woke up alert oriented, no loss of bowel or bladder, no tongue biting.  She denies traumatic injuries.      History provided by:  Patient and relative  Abdominal Pain  Associated symptoms: nausea    Associated symptoms: no chest pain, no constipation, no diarrhea, no dysuria, no fatigue, no fever, no hematuria, no shortness of breath, no sore throat and no vomiting    Dizziness  Associated symptoms: nausea    Associated symptoms: no blood in stool, no chest pain, no diarrhea, no palpitations, no shortness of breath, no vomiting and no weakness        Review of Systems   Constitutional:  Negative for activity change, appetite change, fatigue and fever.   HENT:  Negative for congestion, dental problem, ear pain, rhinorrhea and sore throat.    Eyes:  Negative for pain and redness.   Respiratory:  Negative for chest tightness, shortness of breath and wheezing.    Cardiovascular:  Negative for chest pain and palpitations.    Gastrointestinal:  Positive for abdominal pain and nausea. Negative for blood in stool, constipation, diarrhea and vomiting.   Endocrine: Negative for cold intolerance and heat intolerance.   Genitourinary:  Negative for dysuria, frequency and hematuria.   Musculoskeletal:  Negative for arthralgias and myalgias.   Skin:  Negative for color change, pallor and rash.   Neurological:  Positive for syncope and light-headedness. Negative for dizziness, weakness and numbness.   Hematological:  Does not bruise/bleed easily.   Psychiatric/Behavioral:  Negative for agitation, hallucinations and suicidal ideas.            Objective       ED Triage Vitals [12/20/24 1114]   Temperature Pulse Blood Pressure Respirations SpO2 Patient Position - Orthostatic VS   (!) 97.4 °F (36.3 °C) 60 138/65 16 90 % Sitting      Temp Source Heart Rate Source BP Location FiO2 (%) Pain Score    Oral Monitor Right arm -- 8      Vitals      Date and Time Temp Pulse SpO2 Resp BP Pain Score FACES Pain Rating User   12/20/24 1311 -- 68 98 % 14 123/59 -- -- MR   12/20/24 1114 97.4 °F (36.3 °C) 60 90 % 16 138/65 8 -- CO            Physical Exam  Vitals and nursing note reviewed.   Constitutional:       Appearance: She is well-developed.   HENT:      Head: Normocephalic and atraumatic.   Eyes:      Comments: Patient has painless full range of motion in both her eyes. Normal visual fields. No hyphema noted.   Neck:      Trachea: No tracheal deviation.      Comments: Patient is nontender palpation over her cervical, thoracic, lumbar spines. There is no step-offs, no deformities.  Cardiovascular:      Comments: No JVD noted. Heart sounds are normal. Regular rate rate and rhythm. Symmetric strong distal pulses.  Pulmonary:      Effort: Pulmonary effort is normal.      Breath sounds: Normal breath sounds.   Chest:      Chest wall: No tenderness.   Abdominal:      Comments: No external signs of trauma noted on the abdomen/pelvis. Patient is nontender palpation  of the abdomen. There is no rebound, guarding, CVA tenderness. Abdomen is nondistended. Pelvis stable, nttp.   Musculoskeletal:      Comments: Right upper extremity has full range of motion without pain. There is no tenderness palpation noted. Patient has no external signs of trauma. Patient is neurovascularly intact in this extremity. Left upper extremity has full range of motion without pain. There is no tenderness palpation noted. Patient has no external signs of trauma. Patient is neurovascularly intact in this extremity. Right lower extremity has full range of motion without pain. There is no tenderness palpation noted. Patient has no external signs of trauma. Patient is neurovascularly intact in this extremity. Left Lower  extremity has full range of motion without pain. There is no tenderness palpation noted. Patient has no external signs of trauma. Patient is neurovascularly intact in this extremity.   Skin:     Comments: No external signs of trauma.   Neurological:      Comments: Alert and oriented ×3. Normal mental status exam. Normal cranial nerves II through XII. Normal sensation and strength throughout. Normal cerebellar exam. GCS 15.   Psychiatric:         Behavior: Behavior normal.         Judgment: Judgment normal.         Results Reviewed       Procedure Component Value Units Date/Time    HS Troponin I 2hr [[id number]] Collected: 12/20/24 1443    Lab Status: In process Specimen: Blood from Arm, Left Updated: 12/20/24 1445    HS Troponin I 4hr [[id number]]     Lab Status: No result Specimen: Blood     HS Troponin 0hr (reflex protocol) [[id number]]  (Normal) Collected: 12/20/24 1227    Lab Status: Final result Specimen: Blood from Arm, Left Updated: 12/20/24 1315     hs TnI 0hr 6 ng/L     Comprehensive metabolic panel [[id number]]  (Abnormal) Collected: 12/20/24 1127    Lab Status: Final result Specimen: Blood from Arm, Left Updated: 12/20/24 1154     Sodium 137 mmol/L      Potassium 4.2 mmol/L       Chloride 101 mmol/L      CO2 29 mmol/L      ANION GAP 7 mmol/L      BUN 15 mg/dL      Creatinine 0.58 mg/dL      Glucose 94 mg/dL      Calcium 9.6 mg/dL      AST 21 U/L      ALT 12 U/L      Alkaline Phosphatase 94 U/L      Total Protein 8.7 g/dL      Albumin 4.5 g/dL      Total Bilirubin 0.55 mg/dL      eGFR 93 ml/min/1.73sq m     Narrative:      National Kidney Disease Foundation guidelines for Chronic Kidney Disease (CKD):     Stage 1 with normal or high GFR (GFR > 90 mL/min/1.73 square meters)    Stage 2 Mild CKD (GFR = 60-89 mL/min/1.73 square meters)    Stage 3A Moderate CKD (GFR = 45-59 mL/min/1.73 square meters)    Stage 3B Moderate CKD (GFR = 30-44 mL/min/1.73 square meters)    Stage 4 Severe CKD (GFR = 15-29 mL/min/1.73 square meters)    Stage 5 End Stage CKD (GFR <15 mL/min/1.73 square meters)  Note: GFR calculation is accurate only with a steady state creatinine    Lipase [[id number]]  (Normal) Collected: 12/20/24 1127    Lab Status: Final result Specimen: Blood from Arm, Left Updated: 12/20/24 1154     Lipase 17 u/L     CBC and differential [[id number]]  (Abnormal) Collected: 12/20/24 1127    Lab Status: Final result Specimen: Blood from Arm, Left Updated: 12/20/24 1133     WBC 3.75 Thousand/uL      RBC 4.31 Million/uL      Hemoglobin 13.0 g/dL      Hematocrit 40.5 %      MCV 94 fL      MCH 30.2 pg      MCHC 32.1 g/dL      RDW 13.4 %      MPV 8.9 fL      Platelets 204 Thousands/uL      nRBC 0 /100 WBCs      Segmented % 72 %      Immature Grans % 0 %      Lymphocytes % 19 %      Monocytes % 9 %      Eosinophils Relative 0 %      Basophils Relative 0 %      Absolute Neutrophils 2.68 Thousands/µL      Absolute Immature Grans 0.01 Thousand/uL      Absolute Lymphocytes 0.70 Thousands/µL      Absolute Monocytes 0.34 Thousand/µL      Eosinophils Absolute 0.01 Thousand/µL      Basophils Absolute 0.01 Thousands/µL             CT head without contrast   Final Interpretation by Pallav N Shah, MD (12/20 7211)      No  acute intracranial abnormality.      Enlarged sella and some prominent convolutions along the anterior floor of the middle cranial fossa with tortuous and mildly prominent optic nerve sheaths. In the appropriate clinical setting, consider idiopathic intracranial hypertension.                  Resident: MANOJ TINAJERO I, the attending radiologist, have reviewed the images and agree with the final report above.      Workstation performed: [id number]         CT abdomen pelvis with contrast   Final Interpretation by Lizbeth Lion MD (12/20 1864)      1) No acute abdominal or pelvic pathology.      2) No CT evidence of acute cholecystitis or pancreatitis. No biliary ductal dilatation.      3) Stool throughout the colon, suggestive of constipation. No bowel obstruction.      4) Normal appendix. Evaluation for bowel inflammation elsewhere somewhat limited by underdistention and lack of oral contrast, however no convincing inflammatory changes. Please note mild inflammation may not be apparent.      5) Additional findings as above.                  Workstation performed: [id number]             Procedures    ED Medication and Procedure Management   Prior to Admission Medications   Prescriptions Last Dose Informant Patient Reported? Taking?   Biotin 1 MG CAPS   Yes No   Sig: Take by mouth   Multiple Minerals-Vitamins (CALCIUM-MAGNESIUM-ZINC-D3) TABS  Self Yes No   Sig: Take by mouth daily.   Multiple Vitamins-Minerals (MULTIVITAMIN WITH MINERALS) tablet  Self Yes No   Sig: Take 1 tablet by mouth daily   Specialty Vitamins Products (MAGNESIUM, AMINO ACID CHELATE,) 133 MG tablet  Self Yes No   Sig: Take 133 mg by mouth   albuterol (PROVENTIL HFA,VENTOLIN HFA) 90 mcg/act inhaler   No No   Sig: INHALE 2 PUFFS EVERY 6 HOURS AS NEEDED FOR WHEEZING   ammonium lactate (LAC-HYDRIN) 12 % cream   Yes No   Sig: APPLY TO FEET TWICE DAILY FOR DRY SKIN   desloratadine (CLARINEX) 5 MG tablet  Self No No   Sig: Take 1 tablet (5 mg  total) by mouth daily   famotidine (PEPCID) 20 mg tablet   No No   Sig: TAKE 1 TABLET BY MOUTH TWICE A DAY   ketoconazole (NIZORAL) 2 % cream   Yes No   Sig: APPLY TWICE DAILY TO FOOT FOR 6 WEEKS   meloxicam (MOBIC) 15 mg tablet   Yes No   Sig: Take 15 mg by mouth daily   montelukast (SINGULAIR) 10 mg tablet   No No   Sig: TAKE 1 TABLET BY MOUTH EVERYDAY AT BEDTIME   mycophenolate (CELLCEPT) 500 mg tablet   Yes No   Sig: TAKE THREE TABLETS BY MOUTH EVERY DAY IN THE MORNING AND TAKE TWO TABLETS BY MOUTH EVERY DAY AT BEDTIME   omega-3-acid ethyl esters (LOVAZA) 1 g capsule   Yes No   Sig: Take 2 g by mouth 2 (two) times a day   omeprazole (PriLOSEC) 40 MG capsule   No No   Sig: Take 1 capsule (40 mg total) by mouth daily   promethazine-dextromethorphan (PHENERGAN-DM) 6.25-15 mg/5 mL oral syrup   No No   Sig: Take 5 mL by mouth daily at bedtime as needed for cough   triamcinolone (KENALOG) 0.1 % oral topical paste   No No   Sig: Apply 1 application topically 2 (two) times a day      Facility-Administered Medications: None     Patient's Medications   Discharge Prescriptions    HYOSCYAMINE (ANASPAZ,LEVSIN) 0.125 MG TABLET    Take 1 tablet (0.125 mg total) by mouth every 4 (four) hours as needed for cramping       Start Date: 12/20/2024End Date: --       Order Dose: 0.125 mg       Quantity: 30 tablet    Refills: 0    ONDANSETRON (ZOFRAN) 4 MG TABLET    Take 1 tablet (4 mg total) by mouth every 8 (eight) hours as needed for vomiting or nausea for up to 7 doses       Start Date: 12/20/2024End Date: --       Order Dose: 4 mg       Quantity: 7 tablet    Refills: 0     No discharge procedures on file.  ED SEPSIS DOCUMENTATION   Time reflects when diagnosis was documented in both MDM as applicable and the Disposition within this note       Time User Action Codes Description Comment    12/20/2024  2:41 PM Aneesh Wilson [R10.9] Acute abdominal pain     12/20/2024  2:41 PM Aneesh Wilson [R55] Syncope     12/20/2024  2:41  PM Hosak, Aneesh J Add [K59.00] Constipation     12/20/2024  2:41 PM Aneesh Wilson [J84.9] ILD (interstitial lung disease) (HCC)                  Aneesh Wilson MD  12/20/24 6659

## 2025-01-06 ENCOUNTER — RA CDI HCC (OUTPATIENT)
Dept: OTHER | Facility: HOSPITAL | Age: 70
End: 2025-01-06

## 2025-01-08 ENCOUNTER — CONSULT (OUTPATIENT)
Dept: OBGYN CLINIC | Facility: CLINIC | Age: 70
End: 2025-01-08
Payer: COMMERCIAL

## 2025-01-08 VITALS
HEIGHT: 64 IN | BODY MASS INDEX: 33.97 KG/M2 | DIASTOLIC BLOOD PRESSURE: 82 MMHG | SYSTOLIC BLOOD PRESSURE: 116 MMHG | WEIGHT: 199 LBS

## 2025-01-08 DIAGNOSIS — N81.4 CYSTOCELE WITH PROLAPSE: ICD-10-CM

## 2025-01-08 DIAGNOSIS — N95.0 POSTMENOPAUSAL BLEEDING: Primary | ICD-10-CM

## 2025-01-08 DIAGNOSIS — R10.2 PELVIC PAIN: ICD-10-CM

## 2025-01-08 DIAGNOSIS — R35.0 URINARY FREQUENCY: ICD-10-CM

## 2025-01-08 PROCEDURE — 99203 OFFICE O/P NEW LOW 30 MIN: CPT | Performed by: OBSTETRICS & GYNECOLOGY

## 2025-01-08 NOTE — PROGRESS NOTES
"Subjective    Patient is a 68 yo  who presents today to discuss pelvic pain and pelvic organ prolapse.    She describes RLQ pain that radiates to her back. She has also noticed recent constipation which has since resolved. The pain is worsened by moving in certain positions. Her history is also notable for a cystocele. She states that she was told about the cystocele several years ago, but it has gotten worse recently. She denies any involuntary leakage of fluid, but states that it is difficult to urinate sometimes. She also reports episodes of bleeding when wiping.    OB History          2    Para   2    Term   2            AB        Living             SAB        IAB        Ectopic        Multiple        Live Births                            Objective    Vitals:    25 1029   BP: 116/82   BP Location: Left arm   Patient Position: Sitting   Cuff Size: Large   Weight: 90.3 kg (199 lb)   Height: 5' 4\" (1.626 m)        Physical Exam  Constitutional:       Appearance: Normal appearance.   Genitourinary:      Vulva normal.      Genitourinary Comments: Stage 3 cystocele noted, with apical prolapse noted as well; no rectocele present; small, raised, erythematous, painful lesion noted on cervix at 11:00   HENT:      Head: Normocephalic and atraumatic.   Cardiovascular:      Rate and Rhythm: Normal rate.   Pulmonary:      Effort: Pulmonary effort is normal. No respiratory distress.   Abdominal:      Palpations: Abdomen is soft.      Tenderness: There is no abdominal tenderness.   Neurological:      Mental Status: She is alert.   Skin:     General: Skin is warm and dry.          Assessment    Patient Active Problem List   Diagnosis    Abnormal PFTs    MALINI positive    ILD (interstitial lung disease) (HCC)    Primary osteoarthritis of both knees    Sjogren's syndrome with lung involvement (HCC)    Anti-cardiolipin antibody positive    Seasonal allergic rhinitis due to pollen    Vitamin D deficiency    " Generalized pain    Gastroesophageal reflux disease without esophagitis    Nausea    Atypical nevi    PONV (postoperative nausea and vomiting)    Pelvic pain    Cystocele with prolapse        Plan   - Stage 3 cystocele noted on exam; referral placed to Urogynecology  - lesion noted on cervix; patient denies hx of abnormal pap smears; will have patient return to office for cervical biopsy and pap smear  - Pelvic ultrasound ordered to assess bleeding

## 2025-01-08 NOTE — PATIENT INSTRUCTIONS
- Schedule cervical biopsy and pap smear in this office  - Schedule pelvic ultrasound to assess bleeding by calling [phone]  - Schedule visit with Urogynecologist to assess the prolapse

## 2025-01-13 ENCOUNTER — HOSPITAL ENCOUNTER (OUTPATIENT)
Dept: ULTRASOUND IMAGING | Facility: HOSPITAL | Age: 70
Discharge: HOME | End: 2025-01-13
Attending: OBSTETRICS & GYNECOLOGY
Payer: COMMERCIAL

## 2025-01-13 DIAGNOSIS — N95.0 POSTMENOPAUSAL BLEEDING: ICD-10-CM

## 2025-01-13 DIAGNOSIS — R10.2 PELVIC PAIN: ICD-10-CM

## 2025-01-13 PROCEDURE — 76830 TRANSVAGINAL US NON-OB: CPT

## 2025-01-13 PROCEDURE — 76856 US EXAM PELVIC COMPLETE: CPT

## 2025-01-20 ENCOUNTER — RESULTS FOLLOW-UP (OUTPATIENT)
Dept: OBGYN CLINIC | Facility: CLINIC | Age: 70
End: 2025-01-20

## 2025-01-24 NOTE — PROGRESS NOTES
Sherlyn Amador PA-C to [facility] Clinical Pool       1/23/25  9:16 AM  MRI ordered, please get those answers from the patient asked by Adore as that will help with preauth for the MRI.  Please let patient know how to schedule.  Thank you   [facility]        NAME: Barbara Khan is a 77 y o  female  : 1955    MRN: [id number]  DATE: 2021  TIME: 2:25 PM    Assessment and Plan   Viral syndrome [B34 9]  1  Viral syndrome           Patient Instructions      increase fluids  Remain isolated  Follow up with PCP in 3-5 days  Proceed to  ER if symptoms worsen  Chief Complaint     Chief Complaint   Patient presents with    Cough     Patient presents with cough, shortness of breath, congestion and body aches since Wednesday  She was in contact with her  who was positive for covid  Patient was tested for covid on  and had a negative result  History of Present Illness         was positive for COVID  No loss of taste smell  URI   This is a new problem  The current episode started in the past 7 days  The problem has been unchanged  There has been no fever  Associated symptoms include congestion, coughing and rhinorrhea  Pertinent negatives include no abdominal pain, chest pain, diarrhea, dysuria, ear pain, headaches, joint pain, joint swelling, nausea, neck pain, plugged ear sensation, rash, sinus pain, sneezing, sore throat, swollen glands, vomiting or wheezing  She has tried nothing for the symptoms  Review of Systems   Review of Systems   HENT: Positive for congestion and rhinorrhea  Negative for ear pain, sinus pain, sneezing and sore throat  Respiratory: Positive for cough  Negative for wheezing  Cardiovascular: Negative for chest pain  Gastrointestinal: Negative for abdominal pain, diarrhea, nausea and vomiting  Genitourinary: Negative for dysuria  Musculoskeletal: Negative for joint pain and neck pain  Skin: Negative for rash  Neurological: Negative for headaches  All other systems reviewed and are negative          Current Medications       Current Outpatient Medications:     albuterol (PROAIR HFA) 90 mcg/act inhaler, Inhale 2 puffs 4 (four) times a day as needed, Disp: , Rfl:     Biotin 1 MG CAPS, Take by mouth, Disp: , Rfl:     brompheniramine-pseudoephedrine-DM 30-2-10 MG/5ML syrup, Take 5 mL by mouth 4 (four) times a day as needed for congestion or cough, Disp: 240 mL, Rfl: 0    Chromium Picolinate (CHROMIUM PICOLATE PO), Take by mouth daily  , Disp: , Rfl:     COD LIVER OIL PO, Take by mouth daily  , Disp: , Rfl:     desloratadine (CLARINEX) 5 MG tablet, Take 1 tablet (5 mg total) by mouth daily, Disp: 30 tablet, Rfl: 5    folic acid (FOLVITE) 1 mg tablet, Take 1 mg by mouth daily  , Disp: , Rfl:     ibuprofen (MOTRIN) 600 mg tablet, Take 600 mg by mouth every 6 (six) hours as needed for mild pain , Disp: , Rfl:     montelukast (SINGULAIR) 10 mg tablet, Take 1 tablet (10 mg total) by mouth daily at bedtime, Disp: 90 tablet, Rfl: 0    Multiple Minerals-Vitamins (CALCIUM-MAGNESIUM-ZINC-D3) TABS, Take by mouth daily  , Disp: , Rfl:     Multiple Vitamins-Minerals (MULTIVITAMIN WITH MINERALS) tablet, Take 1 tablet by mouth daily  , Disp: , Rfl:     olopatadine (PATANOL) 0 1 % ophthalmic solution, Administer 1 drop to both eyes 2 (two) times a day, Disp: 5 mL, Rfl: 2    omega-3-acid ethyl esters (LOVAZA) 1 g capsule, Take 2 g by mouth 2 (two) times a day , Disp: , Rfl:     Specialty Vitamins Products (MAGNESIUM, AMINO ACID CHELATE,) 133 MG tablet, Take 133 mg by mouth, Disp: , Rfl:     mycophenolate (CELLCEPT) 500 mg tablet, TAKE THREE TABLETS BY MOUTH EVERY DAY IN THE MORNING AND TAKE TWO TABLETS BY MOUTH EVERY DAY AT BEDTIME, Disp: , Rfl:     Current Allergies     Allergies as of 02/06/2021 - Reviewed 02/06/2021   Allergen Reaction Noted    Aspirin GI Intolerance 08/16/2016    Sulfa antibiotics Rash 08/16/2016            The following portions of the patient's history were reviewed and updated as appropriate: allergies, current medications, past family history, past medical history, past social history, past surgical history and problem list      Past Medical History: Diagnosis Date    Allergic     Allergic rhinitis     [id number]  RESOLVED    Candidiasis of breast     [id number] RESOLVED    Diverticulosis     Hemorrhoids     Lung nodule     Pneumonia     PONV (postoperative nausea and vomiting)     Psoriatic arthritis ([address]     Sjogren's syndrome ([facility] [address] )     Followed by rheumatology - Dr Komal Aleman       Past Surgical History:   Procedure Laterality Date    BREAST LUMPECTOMY Left     COLONOSCOPY      CO BRONCHOSCOPY,DIAGNOSTIC N/A 2/8/2017    Procedure: Brice Ryan;  Surgeon: Vipin Romero MD;  Location: [facility]; Service: Pulmonary    CO COLONOSCOPY FLX DX W/COLLJ SPEC WHEN PFRMD N/A 8/16/2016    Procedure: COLONOSCOPY;  Surgeon: Enzo Arroyo MD;  Location: [facility]; Service: General    UMBILICAL HERNIA REPAIR         Family History   Problem Relation Age of Onset    Hypertension Mother     Hypothyroidism Mother     Breast cancer Sister 43    Arthritis Family          Medications have been verified  Objective   Pulse 78   Temp 98 9 °F (37 2 °C) (Tympanic)   Resp 18   SpO2 97%   No LMP recorded  Patient is postmenopausal        Physical Exam     Physical Exam  Vitals signs and nursing note reviewed  Constitutional:       Appearance: Normal appearance  She is normal weight  HENT:      Head: Normocephalic  Right Ear: Tympanic membrane normal       Left Ear: Tympanic membrane normal       Nose: Congestion and rhinorrhea present  Cardiovascular:      Rate and Rhythm: Normal rate and regular rhythm  Pulmonary:      Effort: Pulmonary effort is normal       Breath sounds: Normal breath sounds  Lymphadenopathy:      Cervical: No cervical adenopathy  Neurological:      Mental Status: She is alert

## 2025-01-29 ENCOUNTER — PROCEDURE VISIT (OUTPATIENT)
Dept: OBGYN CLINIC | Facility: CLINIC | Age: 70
End: 2025-01-29
Payer: COMMERCIAL

## 2025-01-29 VITALS
HEIGHT: 64 IN | WEIGHT: 198 LBS | SYSTOLIC BLOOD PRESSURE: 128 MMHG | DIASTOLIC BLOOD PRESSURE: 84 MMHG | BODY MASS INDEX: 33.8 KG/M2

## 2025-01-29 DIAGNOSIS — N88.8 CERVICAL MASS: Primary | ICD-10-CM

## 2025-01-29 PROCEDURE — 87624 HPV HI-RISK TYP POOLED RSLT: CPT | Performed by: OBSTETRICS & GYNECOLOGY

## 2025-01-29 PROCEDURE — 88175 CYTOPATH C/V AUTO FLUID REDO: CPT | Performed by: PATHOLOGY

## 2025-01-29 PROCEDURE — 57500 BIOPSY OF CERVIX: CPT | Performed by: OBSTETRICS & GYNECOLOGY

## 2025-01-29 PROCEDURE — 88305 TISSUE EXAM BY PATHOLOGIST: CPT | Performed by: STUDENT IN AN ORGANIZED HEALTH CARE EDUCATION/TRAINING PROGRAM

## 2025-01-29 NOTE — PROGRESS NOTES
Cervical Procedure    Date/Time: 1/29/2025 11:00 AM    Performed by: Radha Roman MD  Authorized by: Radha Roman MD    Other Assisting Provider: No    Verbal consent obtained?: Yes    Written consent obtained?: Yes    Risks and benefits: Risks, benefits and alternatives were discussed    Consent given by:  Patient  Time Out:     Time out: Immediately prior to the procedure a time out was called    Patient states understanding of procedure being performed: Yes    Patient's understanding of procedure matches consent: Yes    Procedure consent matches procedure scheduled: Yes    Relevant documents present and verified: Yes    Test results available and properly labeled: Yes    Site marked: No    Radiology Images displayed and confirmed. If images not available, report reviewed: Yes    Required items: Required blood products, implants, devices and special equipment available    Patient identity confirmed:  Verbally with patient  Pre-procedure:     Prepped with comment:  Betadine  Indication:     Indications: cervical lesion.  Procedure:     Procedure: Biopsy of cervix only      Under satisfactory analgesia the patient was prepped and draped in the dorsal lithotomy position: yes      Alicia speculum was placed in the vagina: no (stage 4 apical prolapse, speculum not required)      Cervix was cleansed with Betadine: yes      Intracervical block was performed: no      Cervical biopsy performed with a cervical biopsy punch: yes      Cervical tissue removed with Allis/ Ring Forceps: no      Allis/Tenaculum removed and cervix homostatic: no      Monsel's solution was applied: yes      Silver nitrate was applied: yes      Specimen(s) to pathology: yes    Post-procedure:     Findings: Bleeding      Impression:  Raised, erythematous lesion noted on cervix at 11:00 position    Patient tolerance of procedure:  Tolerated well, no immediate complications  Comments:      Stage 4 apical prolapse noted; referral placed to  Urogynecology previously, number provided for patient to call

## 2025-01-30 LAB
HPV HR 12 DNA CVX QL NAA+PROBE: NEGATIVE
HPV16 DNA CVX QL NAA+PROBE: NEGATIVE
HPV18 DNA CVX QL NAA+PROBE: NEGATIVE

## 2025-02-04 ENCOUNTER — RESULTS FOLLOW-UP (OUTPATIENT)
Dept: OBGYN CLINIC | Facility: CLINIC | Age: 70
End: 2025-02-04

## 2025-02-04 LAB
LAB AP GYN PRIMARY INTERPRETATION: ABNORMAL
Lab: ABNORMAL
PATH INTERP SPEC-IMP: ABNORMAL

## 2025-02-04 PROCEDURE — 88141 CYTOPATH C/V INTERPRET: CPT | Performed by: PATHOLOGY

## 2025-02-04 PROCEDURE — 88305 TISSUE EXAM BY PATHOLOGIST: CPT | Performed by: STUDENT IN AN ORGANIZED HEALTH CARE EDUCATION/TRAINING PROGRAM

## 2025-02-09 DIAGNOSIS — J31.0 CHRONIC RHINITIS: ICD-10-CM

## 2025-02-11 RX ORDER — MONTELUKAST SODIUM 10 MG/1
10 TABLET ORAL
Qty: 90 TABLET | Refills: 1 | Status: SHIPPED | OUTPATIENT
Start: 2025-02-11

## 2025-02-27 ENCOUNTER — OFFICE VISIT (OUTPATIENT)
Dept: FAMILY MEDICINE CLINIC | Facility: CLINIC | Age: 70
End: 2025-02-27
Payer: COMMERCIAL

## 2025-02-27 VITALS
BODY MASS INDEX: 33.35 KG/M2 | TEMPERATURE: 98.3 F | HEIGHT: 63 IN | OXYGEN SATURATION: 98 % | WEIGHT: 188.2 LBS | DIASTOLIC BLOOD PRESSURE: 84 MMHG | SYSTOLIC BLOOD PRESSURE: 120 MMHG | HEART RATE: 88 BPM

## 2025-02-27 DIAGNOSIS — J84.9 ILD (INTERSTITIAL LUNG DISEASE) (HCC): ICD-10-CM

## 2025-02-27 DIAGNOSIS — E67.3 HYPERVITAMINOSIS D: ICD-10-CM

## 2025-02-27 DIAGNOSIS — E66.9 OBESITY (BMI 30-39.9): ICD-10-CM

## 2025-02-27 DIAGNOSIS — K21.9 GASTROESOPHAGEAL REFLUX DISEASE WITHOUT ESOPHAGITIS: ICD-10-CM

## 2025-02-27 DIAGNOSIS — Z00.00 MEDICARE ANNUAL WELLNESS VISIT, SUBSEQUENT: Primary | ICD-10-CM

## 2025-02-27 DIAGNOSIS — Z13.220 SCREENING, LIPID: ICD-10-CM

## 2025-02-27 DIAGNOSIS — Z13.1 SCREENING FOR DIABETES MELLITUS: ICD-10-CM

## 2025-02-27 DIAGNOSIS — M35.02 SJOGREN'S SYNDROME WITH LUNG INVOLVEMENT (HCC): ICD-10-CM

## 2025-02-27 PROBLEM — E55.9 VITAMIN D DEFICIENCY: Status: RESOLVED | Noted: 2020-11-17 | Resolved: 2025-02-27

## 2025-02-27 PROBLEM — R11.0 NAUSEA: Status: RESOLVED | Noted: 2024-06-03 | Resolved: 2025-02-27

## 2025-02-27 PROCEDURE — 99214 OFFICE O/P EST MOD 30 MIN: CPT | Performed by: FAMILY MEDICINE

## 2025-02-27 PROCEDURE — G0439 PPPS, SUBSEQ VISIT: HCPCS | Performed by: FAMILY MEDICINE

## 2025-02-27 PROCEDURE — G2211 COMPLEX E/M VISIT ADD ON: HCPCS | Performed by: FAMILY MEDICINE

## 2025-02-27 NOTE — PROGRESS NOTES
Name: Jessica Santamaria      : 1955      MRN: [id number]  Encounter Provider: Patricia Dos Santos DO  Encounter Date: 2025   Encounter department: [facility] PRIMARY [facility]ARE    Assessment & Plan  Medicare annual wellness visit, subsequent  Patient needs to complete her advanced directives She declines flu shot and shingles She will get the pneumonia shot but wants to wait until after her endometrial biopsy is done Patient is up to date on screenings        ILD (interstitial lung disease) (HCC)  Continue with the clarinex and singulair as well as the albuterol as needed        Sjogren's syndrome with lung involvement (HCC)  Continue cellcept and followup with the rheumatologist        Screening, lipid  Check labs   Orders:    Lipid panel; Future    Screening for diabetes mellitus  Check labs   Orders:    Comprehensive metabolic panel; Future    Hypervitaminosis D  Patient to have labs continue to stay off the vitamin D supplement  Orders:    Vitamin D 25 hydroxy; Future    Obesity (BMI 30-39.9)  Discussed diet and exercise Patient to work on weight loss Followup in 6 months     Orders:    Comprehensive metabolic panel; Future    Lipid panel; Future    Gastroesophageal reflux disease without esophagitis  Continue with the omeprazole and followup in 6 motnhs           Preventive health issues were discussed with patient, and age appropriate screening tests were ordered as noted in patient's After Visit Summary. Personalized health advice and appropriate referrals for health education or preventive services given if needed, as noted in patient's After Visit Summary.    History of Present Illness     Patient is here for medicare wellness and followup of interstitial lung disease sjogren's  GERD obesity and her hypervitamin D patient is overall doing OK She is have procedure for the prolapse However she is having endometrial biopsy done tomorrow Patient is doing well breathing wise Patient is seeing  rheumatology and is doing well on cellcept She has no reflux issues She is trying to watch her weight with diet        Patient Care Team:  Patricia Dos Santos DO as PCP - General (Family Medicine)  MD Madan Ma MD John Edward Connelly, DO Marian McDonald, MD as Endoscopist    Review of Systems   Constitutional:  Negative for fatigue, fever and unexpected weight change.   HENT:  Negative for congestion, sinus pain and trouble swallowing.    Eyes:  Negative for discharge and visual disturbance.   Respiratory:  Negative for cough, chest tightness, shortness of breath and wheezing.    Cardiovascular:  Negative for chest pain, palpitations and leg swelling.   Gastrointestinal:  Negative for abdominal pain, blood in stool, constipation, diarrhea, nausea and vomiting.   Genitourinary:  Positive for pelvic pain. Negative for difficulty urinating, dysuria, frequency and hematuria.   Musculoskeletal:  Positive for arthralgias. Negative for gait problem and joint swelling.        Bilateral knee pain due to DJD   Skin:  Negative for rash and wound.   Allergic/Immunologic: Negative for environmental allergies and food allergies.   Neurological:  Negative for dizziness, syncope, weakness, numbness and headaches.   Hematological:  Negative for adenopathy. Does not bruise/bleed easily.   Psychiatric/Behavioral:  Negative for confusion, decreased concentration and sleep disturbance. The patient is not nervous/anxious.      Medical History Reviewed by provider this encounter:       Annual Wellness Visit Questionnaire   Jessica is here for her Subsequent Wellness visit.     Health Risk Assessment:   Patient rates overall health as very good. Patient feels that their physical health rating is same. Patient is very satisfied with their life. Eyesight was rated as same. Hearing was rated as same. Patient feels that their emotional and mental health rating is same. Patients states they are never,  rarely angry. Patient states they are sometimes unusually tired/fatigued. Pain experienced in the last 7 days has been some. Patient's pain rating has been 5/10. Patient states that she has experienced no weight loss or gain in last 6 months. Pelvic pain due to prolapse will have procedure to fix this     Depression Screening:   PHQ-2 Score: 0      Fall Risk Screening:   In the past year, patient has experienced: no history of falling in past year      Urinary Incontinence Screening:   Patient has not leaked urine accidently in the last six months.     Home Safety:  Patient has trouble with stairs inside or outside of their home. Patient has working smoke alarms and has working carbon monoxide detector. Home safety hazards include: none.     Nutrition:   Current diet is Low Carb.     Medications:   Patient is currently taking over-the-counter supplements. OTC medications include: see medication list. Patient is able to manage medications.     Activities of Daily Living (ADLs)/Instrumental Activities of Daily Living (IADLs):   Walk and transfer into and out of bed and chair?: Yes  Dress and groom yourself?: Yes    Bathe or shower yourself?: Yes    Feed yourself? Yes  Do your laundry/housekeeping?: Yes  Manage your money, pay your bills and track your expenses?: Yes  Make your own meals?: Yes    Do your own shopping?: Yes    Previous Hospitalizations:   Any hospitalizations or ED visits within the last 12 months?: Yes    How many hospitalizations have you had in the last year?: 1-2    Hospitalization Comments: Flu visit    Advance Care Planning:   Living will: Yes    Durable POA for healthcare: No    Advanced directive: Yes    ACP document given: Yes      Cognitive Screening:   Provider or family/friend/caregiver concerned regarding cognition?: No    PREVENTIVE SCREENINGS      Cardiovascular Screening:    General: Screening Current      Diabetes Screening:     General: Screening Current      Colorectal Cancer  "Screening:     General: Screening Current      Breast Cancer Screening:     General: Screening Current      Cervical Cancer Screening:    General: Screening Not Indicated      Lung Cancer Screening:     General: Screening Not Indicated      Hepatitis C Screening:    General: Screening Current    Screening, Brief Intervention, and Referral to Treatment (SBIRT)     Screening  Typical number of drinks in a day: 0  Typical number of drinks in a week: 0  Interpretation: Low risk drinking behavior.    Social Drivers of Health     Financial Resource Strain: Low Risk  (7/25/2023)    Overall Financial Resource Strain (CARDIA)     Difficulty of Paying Living Expenses: Not hard at all   Transportation Needs: No Transportation Needs (7/25/2023)    PRAPARE - Transportation     Lack of Transportation (Medical): No     Lack of Transportation (Non-Medical): No     No results found.    Objective   /84 (BP Location: Left arm, Patient Position: Sitting, Cuff Size: Standard)   Pulse 88   Temp 98.3 °F (36.8 °C) (Temporal)   Ht 5' 3\" (1.6 m)   Wt 85.4 kg (188 lb 3.2 oz)   SpO2 98%   BMI 33.34 kg/m²     Physical Exam  Vitals and nursing note reviewed.   Constitutional:       Appearance: She is well-developed. She is obese.   HENT:      Head: Normocephalic and atraumatic.      Right Ear: Hearing, tympanic membrane and external ear normal.      Left Ear: Hearing, tympanic membrane and external ear normal.   Eyes:      Extraocular Movements: Extraocular movements intact.      Conjunctiva/sclera: Conjunctivae normal.      Pupils: Pupils are equal, round, and reactive to light.   Neck:      Thyroid: No thyromegaly.   Cardiovascular:      Rate and Rhythm: Normal rate and regular rhythm.      Heart sounds: Normal heart sounds.   Pulmonary:      Effort: Pulmonary effort is normal.      Breath sounds: Normal breath sounds. No wheezing or rales.   Abdominal:      General: Bowel sounds are normal. There is no distension.      Palpations: " Abdomen is soft.      Tenderness: There is no abdominal tenderness.   Musculoskeletal:         General: No tenderness.      Cervical back: Neck supple.   Lymphadenopathy:      Cervical: No cervical adenopathy.   Skin:     General: Skin is warm and dry.      Findings: No rash.   Neurological:      General: No focal deficit present.      Mental Status: She is alert and oriented to person, place, and time.      Cranial Nerves: No cranial nerve deficit.      Coordination: Coordination normal.   Psychiatric:         Mood and Affect: Mood normal.         Behavior: Behavior normal.         Thought Content: Thought content normal.         Judgment: Judgment normal.

## 2025-02-27 NOTE — ASSESSMENT & PLAN NOTE
Patient needs to complete her advanced directives She declines flu shot and shingles She will get the pneumonia shot but wants to wait until after her endometrial biopsy is done Patient is up to date on screenings

## 2025-02-27 NOTE — PATIENT INSTRUCTIONS
Medicare Preventive Visit Patient Instructions  Thank you for completing your Welcome to Medicare Visit or Medicare Annual Wellness Visit today. Your next wellness visit will be due in one year (2/28/2026).  The screening/preventive services that you may require over the next 5-10 years are detailed below. Some tests may not apply to you based off risk factors and/or age. Screening tests ordered at today's visit but not completed yet may show as past due. Also, please note that scanned in results may not display below.  Preventive Screenings:  Service Recommendations Previous Testing/Comments   Colorectal Cancer Screening  * Colonoscopy    * Fecal Occult Blood Test (FOBT)/Fecal Immunochemical Test (FIT)  * Fecal DNA/Cologuard Test  * Flexible Sigmoidoscopy Age: 45-75 years old   Colonoscopy: every 10 years (may be performed more frequently if at higher risk)  OR  FOBT/FIT: every 1 year  OR  Cologuard: every 3 years  OR  Sigmoidoscopy: every 5 years  Screening may be recommended earlier than age 45 if at higher risk for colorectal cancer. Also, an individualized decision between you and your healthcare provider will decide whether screening between the ages of 76-85 would be appropriate. Colonoscopy: 08/16/2016  FOBT/FIT: Not on file  Cologuard: Not on file  Sigmoidoscopy: Not on file    Screening Current     Breast Cancer Screening Age: 40+ years old  Frequency: every 1-2 years  Not required if history of left and right mastectomy Mammogram: 12/19/2024    Screening Current   Cervical Cancer Screening Between the ages of 21-29, pap smear recommended once every 3 years.   Between the ages of 30-65, can perform pap smear with HPV co-testing every 5 years.   Recommendations may differ for women with a history of total hysterectomy, cervical cancer, or abnormal pap smears in past. Pap Smear: 02/04/2025    Screening Not Indicated   Hepatitis C Screening Once for adults born between 1945 and 1965  More frequently in  patients at high risk for Hepatitis C Hep C Antibody: 02/26/2019    Screening Current   Diabetes Screening 1-2 times per year if you're at risk for diabetes or have pre-diabetes Fasting glucose: 89 mg/dL (6/19/2023)  A1C: No results in last 5 years (No results in last 5 years)  Screening Current   Cholesterol Screening Once every 5 years if you don't have a lipid disorder. May order more often based on risk factors. Lipid panel: 07/26/2023    Screening Current     Other Preventive Screenings Covered by Medicare:  Abdominal Aortic Aneurysm (AAA) Screening: covered once if your at risk. You're considered to be at risk if you have a family history of AAA.  Lung Cancer Screening: covers low dose CT scan once per year if you meet all of the following conditions: (1) Age 55-77; (2) No signs or symptoms of lung cancer; (3) Current smoker or have quit smoking within the last 15 years; (4) You have a tobacco smoking history of at least 20 pack years (packs per day multiplied by number of years you smoked); (5) You get a written order from a healthcare provider.  Glaucoma Screening: covered annually if you're considered high risk: (1) You have diabetes OR (2) Family history of glaucoma OR (3)  aged 50 and older OR (4)  American aged 65 and older  Osteoporosis Screening: covered every 2 years if you meet one of the following conditions: (1) You're estrogen deficient and at risk for osteoporosis based off medical history and other findings; (2) Have a vertebral abnormality; (3) On glucocorticoid therapy for more than 3 months; (4) Have primary hyperparathyroidism; (5) On osteoporosis medications and need to assess response to drug therapy.   Last bone density test (DXA Scan): 02/18/2019.  HIV Screening: covered annually if you're between the age of 15-65. Also covered annually if you are younger than 15 and older than 65 with risk factors for HIV infection. For pregnant patients, it is covered up to 3  times per pregnancy.    Immunizations:  Immunization Recommendations   Influenza Vaccine Annual influenza vaccination during flu season is recommended for all persons aged >= 6 months who do not have contraindications   Pneumococcal Vaccine   * Pneumococcal conjugate vaccine = PCV13 (Prevnar 13), PCV15 (Vaxneuvance), PCV20 (Prevnar 20)  * Pneumococcal polysaccharide vaccine = PPSV23 (Pneumovax) Adults 19-63 yo with certain risk factors or if 65+ yo  If never received any pneumonia vaccine: recommend Prevnar 20 (PCV20)  Give PCV20 if previously received 1 dose of PCV13 or PPSV23   Hepatitis B Vaccine 3 dose series if at intermediate or high risk (ex: diabetes, end stage renal disease, liver disease)   Respiratory syncytial virus (RSV) Vaccine - COVERED BY MEDICARE PART D  * RSVPreF3 (Arexvy) CDC recommends that adults 60 years of age and older may receive a single dose of RSV vaccine using shared clinical decision-making (SCDM)   Tetanus (Td) Vaccine - COST NOT COVERED BY MEDICARE PART B Following completion of primary series, a booster dose should be given every 10 years to maintain immunity against tetanus. Td may also be given as tetanus wound prophylaxis.   Tdap Vaccine - COST NOT COVERED BY MEDICARE PART B Recommended at least once for all adults. For pregnant patients, recommended with each pregnancy.   Shingles Vaccine (Shingrix) - COST NOT COVERED BY MEDICARE PART B  2 shot series recommended in those 19 years and older who have or will have weakened immune systems or those 50 years and older     Health Maintenance Due:      Topic Date Due   • Breast Cancer Screening: Mammogram  12/19/2025   • Colorectal Cancer Screening  08/16/2026   • Hepatitis C Screening  Completed     Immunizations Due:      Topic Date Due   • COVID-19 Vaccine (1) Never done   • Pneumococcal Vaccine: 65+ Years (3 of 3 - PPSV23, PCV20 or PCV21) 05/11/2020   • Influenza Vaccine (1) 09/01/2024     Advance Directives   What are advance  directives?  Advance directives are legal documents that state your wishes and plans for medical care. These plans are made ahead of time in case you lose your ability to make decisions for yourself. Advance directives can apply to any medical decision, such as the treatments you want, and if you want to donate organs.   What are the types of advance directives?  There are many types of advance directives, and each state has rules about how to use them. You may choose a combination of any of the following:  Living will:  This is a written record of the treatment you want. You can also choose which treatments you do not want, which to limit, and which to stop at a certain time. This includes surgery, medicine, IV fluid, and tube feedings.   Durable power of  for healthcare (DPAHC):  This is a written record that states who you want to make healthcare choices for you when you are unable to make them for yourself. This person, called a proxy, is usually a family member or a friend. You may choose more than 1 proxy.  Do not resuscitate (DNR) order:  A DNR order is used in case your heart stops beating or you stop breathing. It is a request not to have certain forms of treatment, such as CPR. A DNR order may be included in other types of advance directives.  Medical directive:  This covers the care that you want if you are in a coma, near death, or unable to make decisions for yourself. You can list the treatments you want for each condition. Treatment may include pain medicine, surgery, blood transfusions, dialysis, IV or tube feedings, and a ventilator (breathing machine).  Values history:  This document has questions about your views, beliefs, and how you feel and think about life. This information can help others choose the care that you would choose.  Why are advance directives important?  An advance directive helps you control your care. Although spoken wishes may be used, it is better to have your wishes  written down. Spoken wishes can be misunderstood, or not followed. Treatments may be given even if you do not want them. An advance directive may make it easier for your family to make difficult choices about your care.   Weight Management   Why it is important to manage your weight:  Being overweight increases your risk of health conditions such as heart disease, high blood pressure, type 2 diabetes, and certain types of cancer. It can also increase your risk for osteoarthritis, sleep apnea, and other respiratory problems. Aim for a slow, steady weight loss. Even a small amount of weight loss can lower your risk of health problems.  How to lose weight safely:  A safe and healthy way to lose weight is to eat fewer calories and get regular exercise. You can lose up about 1 pound a week by decreasing the number of calories you eat by 500 calories each day.   Healthy meal plan for weight management:  A healthy meal plan includes a variety of foods, contains fewer calories, and helps you stay healthy. A healthy meal plan includes the following:  Eat whole-grain foods more often.  A healthy meal plan should contain fiber. Fiber is the part of grains, fruits, and vegetables that is not broken down by your body. Whole-grain foods are healthy and provide extra fiber in your diet. Some examples of whole-grain foods are whole-wheat breads and pastas, oatmeal, brown rice, and bulgur.  Eat a variety of vegetables every day.  Include dark, leafy greens such as spinach, kale, merritt greens, and mustard greens. Eat yellow and orange vegetables such as carrots, sweet potatoes, and winter squash.   Eat a variety of fruits every day.  Choose fresh or canned fruit (canned in its own juice or light syrup) instead of juice. Fruit juice has very little or no fiber.  Eat low-fat dairy foods.  Drink fat-free (skim) milk or 1% milk. Eat fat-free yogurt and low-fat cottage cheese. Try low-fat cheeses such as mozzarella and other reduced-fat  cheeses.  Choose meat and other protein foods that are low in fat.  Choose beans or other legumes such as split peas or lentils. Choose fish, skinless poultry (chicken or turkey), or lean cuts of red meat (beef or pork). Before you cook meat or poultry, cut off any visible fat.   Use less fat and oil.  Try baking foods instead of frying them. Add less fat, such as margarine, sour cream, regular salad dressing and mayonnaise to foods. Eat fewer high-fat foods. Some examples of high-fat foods include french fries, doughnuts, ice cream, and cakes.  Eat fewer sweets.  Limit foods and drinks that are high in sugar. This includes candy, cookies, regular soda, and sweetened drinks.  Exercise:  Exercise at least 30 minutes per day on most days of the week. Some examples of exercise include walking, biking, dancing, and swimming. You can also fit in more physical activity by taking the stairs instead of the elevator or parking farther away from stores. Ask your healthcare provider about the best exercise plan for you.      © Copyright [employer] 2018 Information is for End User's use only and may not be sold, redistributed or otherwise used for commercial purposes. All illustrations and images included in CareNotes® are the copyrighted property of [employer] or [employer]

## 2025-02-28 ENCOUNTER — PROCEDURE VISIT (OUTPATIENT)
Dept: OBGYN CLINIC | Facility: CLINIC | Age: 70
End: 2025-02-28
Payer: COMMERCIAL

## 2025-02-28 VITALS
BODY MASS INDEX: 33.29 KG/M2 | HEIGHT: 64 IN | WEIGHT: 195 LBS | DIASTOLIC BLOOD PRESSURE: 68 MMHG | SYSTOLIC BLOOD PRESSURE: 120 MMHG

## 2025-02-28 DIAGNOSIS — N95.0 POSTMENOPAUSAL BLEEDING: Primary | ICD-10-CM

## 2025-02-28 DIAGNOSIS — R87.619 ATYPICAL CERVICAL GLANDULAR CELLS: ICD-10-CM

## 2025-02-28 PROCEDURE — 58100 BIOPSY OF UTERUS LINING: CPT | Performed by: OBSTETRICS & GYNECOLOGY

## 2025-02-28 PROCEDURE — 88305 TISSUE EXAM BY PATHOLOGIST: CPT | Performed by: PATHOLOGY

## 2025-02-28 NOTE — PROGRESS NOTES
"Endometrial biopsy    Date/Time: 2/28/2025 3:15 PM    Performed by: Radha Roman MD  Authorized by: Radha Roman MD  Universal Protocol:  procedure performed by consultantConsent: Verbal consent obtained. Written consent obtained.  Risks and benefits: risks, benefits and alternatives were discussed  Consent given by: patient  Time out: Immediately prior to procedure a \"time out\" was called to verify the correct patient, procedure, equipment, support staff and site/side marked as required.  Patient understanding: patient states understanding of the procedure being performed  Patient consent: the patient's understanding of the procedure matches consent given  Procedure consent: procedure consent matches procedure scheduled  Relevant documents: relevant documents present and verified  Test results: test results not available  Site marked: the operative site was not marked  Radiology Images displayed and confirmed. If images not available, report reviewed: imaging studies available  Required items: required blood products, implants, devices, and special equipment available  Patient identity confirmed: verbally with patient    Indication:     Indications: Post-menopausal bleeding      Chronicity of post-menopausal bleeding:  New  Procedure:     Procedure: endometrial biopsy with [place]      A bivalve speculum was placed in the vagina: yes      Cervix cleaned and prepped: yes      A paracervical block was performed: no      An intracervical block was performed: no      The cervix was dilated: no      Uterus sounded: yes      Uterus sound depth (cm):  9    Specimen collected: specimen collected and sent to pathology    Findings:     Cervix: normal      Adnexa: normal    Comments:     Procedure comments:  Scant tissue obtained      "

## 2025-02-28 NOTE — PROGRESS NOTES
Cervical Procedure    Date/Time: 2/28/2025 3:15 PM    Performed by: Radha Roman MD  Authorized by: Radha Roman MD    Other Assisting Provider: No    Verbal consent obtained?: Yes    Written consent obtained?: Yes    Risks and benefits: Risks, benefits and alternatives were discussed    Consent given by:  Patient  Time Out:     Time out: Immediately prior to the procedure a time out was called    Patient states understanding of procedure being performed: Yes    Patient's understanding of procedure matches consent: Yes    Procedure consent matches procedure scheduled: Yes    Relevant documents present and verified: Yes    Test results available and properly labeled: Yes    Required items: Required blood products, implants, devices and special equipment available    Patient identity confirmed:  Verbally with patient  Pre-procedure:     Prepped with: acetic acid    Indication:     Indications: AGC, ulceration.  Procedure:     Procedure: Biopsy of cervix only      Under satisfactory analgesia the patient was prepped and draped in the dorsal lithotomy position: yes      [place] speculum was placed in the vagina: yes      Cervix was cleansed with Betadine: yes      Intracervical block was performed: no      Cervical biopsy performed with a cervical biopsy punch: yes      Cervical tissue removed with Allis/ Ring Forceps: yes      Allis/Tenaculum removed and cervix homostatic: yes      Monsel's solution was applied: yes      Specimen(s) to pathology: yes (ECC, ulceration)    Post-procedure:     Findings: Friable cervix      Patient tolerance of procedure:  Tolerated well, no immediate complications

## 2025-03-04 ENCOUNTER — RESULTS FOLLOW-UP (OUTPATIENT)
Dept: OBGYN CLINIC | Facility: CLINIC | Age: 70
End: 2025-03-04

## 2025-03-04 PROCEDURE — 88305 TISSUE EXAM BY PATHOLOGIST: CPT | Performed by: PATHOLOGY

## 2025-03-04 NOTE — TELEPHONE ENCOUNTER
Pt returned call. Reviewed normal biopsies. She verbalized understanding and is thankful.   Spontaneous, unlabored and symmetrical

## 2025-03-11 ENCOUNTER — APPOINTMENT (OUTPATIENT)
Dept: LAB | Facility: HOSPITAL | Age: 70
End: 2025-03-11
Payer: COMMERCIAL

## 2025-03-11 DIAGNOSIS — E67.3 HYPERVITAMINOSIS D: ICD-10-CM

## 2025-03-11 DIAGNOSIS — Z13.220 SCREENING, LIPID: ICD-10-CM

## 2025-03-11 DIAGNOSIS — E66.9 OBESITY (BMI 30-39.9): ICD-10-CM

## 2025-03-11 DIAGNOSIS — Z13.1 SCREENING FOR DIABETES MELLITUS: ICD-10-CM

## 2025-03-11 LAB
25(OH)D3 SERPL-MCNC: 52.4 NG/ML (ref 30–100)
ALBUMIN SERPL BCG-MCNC: 4.3 G/DL (ref 3.5–5)
ALP SERPL-CCNC: 80 U/L (ref 34–104)
ALT SERPL W P-5'-P-CCNC: 9 U/L (ref 7–52)
ANION GAP SERPL CALCULATED.3IONS-SCNC: 6 MMOL/L (ref 4–13)
AST SERPL W P-5'-P-CCNC: 16 U/L (ref 13–39)
BILIRUB SERPL-MCNC: 0.53 MG/DL (ref 0.2–1)
BUN SERPL-MCNC: 14 MG/DL (ref 5–25)
CALCIUM SERPL-MCNC: 9.3 MG/DL (ref 8.4–10.2)
CHLORIDE SERPL-SCNC: 105 MMOL/L (ref 96–108)
CHOLEST SERPL-MCNC: 171 MG/DL (ref ?–200)
CO2 SERPL-SCNC: 29 MMOL/L (ref 21–32)
CREAT SERPL-MCNC: 0.6 MG/DL (ref 0.6–1.3)
GFR SERPL CREATININE-BSD FRML MDRD: 91 ML/MIN/1.73SQ M
GLUCOSE P FAST SERPL-MCNC: 90 MG/DL (ref 65–99)
HDLC SERPL-MCNC: 54 MG/DL
LDLC SERPL CALC-MCNC: 104 MG/DL (ref 0–100)
NONHDLC SERPL-MCNC: 117 MG/DL
POTASSIUM SERPL-SCNC: 4.4 MMOL/L (ref 3.5–5.3)
PROT SERPL-MCNC: 8.1 G/DL (ref 6.4–8.4)
SODIUM SERPL-SCNC: 140 MMOL/L (ref 135–147)
TRIGL SERPL-MCNC: 65 MG/DL (ref ?–150)

## 2025-03-11 PROCEDURE — 36415 COLL VENOUS BLD VENIPUNCTURE: CPT

## 2025-03-11 PROCEDURE — 80053 COMPREHEN METABOLIC PANEL: CPT

## 2025-03-11 PROCEDURE — 80061 LIPID PANEL: CPT

## 2025-03-11 PROCEDURE — 82306 VITAMIN D 25 HYDROXY: CPT

## 2025-03-12 ENCOUNTER — RESULTS FOLLOW-UP (OUTPATIENT)
Dept: FAMILY MEDICINE CLINIC | Facility: CLINIC | Age: 70
End: 2025-03-12

## 2025-03-15 DIAGNOSIS — R13.10 DYSPHAGIA, UNSPECIFIED TYPE: ICD-10-CM

## 2025-03-15 DIAGNOSIS — K21.9 GASTROESOPHAGEAL REFLUX DISEASE WITHOUT ESOPHAGITIS: ICD-10-CM

## 2025-03-15 DIAGNOSIS — R11.0 NAUSEA: ICD-10-CM

## 2025-03-17 RX ORDER — FAMOTIDINE 20 MG/1
20 TABLET, FILM COATED ORAL 2 TIMES DAILY
Qty: 180 TABLET | Refills: 1 | Status: SHIPPED | OUTPATIENT
Start: 2025-03-17

## 2025-03-26 ENCOUNTER — DOCUMENTATION (OUTPATIENT)
Dept: ADMINISTRATIVE | Facility: OTHER | Age: 70
End: 2025-03-26

## 2025-03-26 NOTE — PROGRESS NOTES
Annual Wellness Visit outreach is not required, an AWV was completed at the PCP office.    Thank you.  Margarette Ayala  [facility]

## 2025-03-29 PROBLEM — Z00.00 MEDICARE ANNUAL WELLNESS VISIT, SUBSEQUENT: Status: RESOLVED | Noted: 2023-07-25 | Resolved: 2025-03-29

## 2025-04-08 ENCOUNTER — TELEPHONE (OUTPATIENT)
Dept: FAMILY MEDICINE CLINIC | Facility: CLINIC | Age: 70
End: 2025-04-08

## 2025-04-08 ENCOUNTER — CONSULT (OUTPATIENT)
Dept: FAMILY MEDICINE CLINIC | Facility: CLINIC | Age: 70
End: 2025-04-08
Payer: COMMERCIAL

## 2025-04-08 ENCOUNTER — APPOINTMENT (OUTPATIENT)
Dept: LAB | Facility: HOSPITAL | Age: 70
End: 2025-04-08
Payer: COMMERCIAL

## 2025-04-08 VITALS
HEART RATE: 73 BPM | HEIGHT: 64 IN | TEMPERATURE: 97.2 F | DIASTOLIC BLOOD PRESSURE: 70 MMHG | SYSTOLIC BLOOD PRESSURE: 122 MMHG | WEIGHT: 198 LBS | RESPIRATION RATE: 16 BRPM | BODY MASS INDEX: 33.8 KG/M2 | OXYGEN SATURATION: 98 %

## 2025-04-08 DIAGNOSIS — N81.2 UTEROVAGINAL PROLAPSE, INCOMPLETE: ICD-10-CM

## 2025-04-08 DIAGNOSIS — Z01.812 PRE-OPERATIVE LABORATORY EXAMINATION: ICD-10-CM

## 2025-04-08 DIAGNOSIS — M35.02 SJOGREN'S SYNDROME WITH LUNG INVOLVEMENT (HCC): ICD-10-CM

## 2025-04-08 DIAGNOSIS — K21.9 GASTROESOPHAGEAL REFLUX DISEASE WITHOUT ESOPHAGITIS: ICD-10-CM

## 2025-04-08 DIAGNOSIS — M17.0 PRIMARY OSTEOARTHRITIS OF BOTH KNEES: ICD-10-CM

## 2025-04-08 DIAGNOSIS — R39.15 URGENCY OF URINATION: ICD-10-CM

## 2025-04-08 DIAGNOSIS — Z01.818 PREOP GENERAL PHYSICAL EXAM: ICD-10-CM

## 2025-04-08 DIAGNOSIS — N81.4 CYSTOCELE WITH PROLAPSE: Primary | ICD-10-CM

## 2025-04-08 DIAGNOSIS — J84.9 ILD (INTERSTITIAL LUNG DISEASE) (HCC): ICD-10-CM

## 2025-04-08 DIAGNOSIS — N81.11 MIDLINE CYSTOCELE: ICD-10-CM

## 2025-04-08 PROBLEM — N81.6 HERNIATION OF RECTUM INTO VAGINA: Status: ACTIVE | Noted: 2025-04-08

## 2025-04-08 PROBLEM — N81.84 ATROPHY OF SKELETAL MUSCLE OF PELVIS: Status: ACTIVE | Noted: 2025-04-08

## 2025-04-08 PROBLEM — N95.2 ATROPHIC VAGINITIS: Status: ACTIVE | Noted: 2025-04-08

## 2025-04-08 PROBLEM — N36.41 HYPERMOBILITY OF URETHRA: Status: ACTIVE | Noted: 2025-04-08

## 2025-04-08 PROBLEM — N81.9 PROLAPSE OF FEMALE GENITAL ORGANS: Status: ACTIVE | Noted: 2025-04-08

## 2025-04-08 LAB
ANION GAP SERPL CALCULATED.3IONS-SCNC: 5 MMOL/L (ref 4–13)
BASOPHILS # BLD AUTO: 0.01 THOUSANDS/ÂΜL (ref 0–0.1)
BASOPHILS NFR BLD AUTO: 0 % (ref 0–1)
BUN SERPL-MCNC: 19 MG/DL (ref 5–25)
CALCIUM SERPL-MCNC: 9.8 MG/DL (ref 8.4–10.2)
CHLORIDE SERPL-SCNC: 104 MMOL/L (ref 96–108)
CO2 SERPL-SCNC: 28 MMOL/L (ref 21–32)
CREAT SERPL-MCNC: 0.61 MG/DL (ref 0.6–1.3)
EOSINOPHIL # BLD AUTO: 0.02 THOUSAND/ÂΜL (ref 0–0.61)
EOSINOPHIL NFR BLD AUTO: 1 % (ref 0–6)
ERYTHROCYTE [DISTWIDTH] IN BLOOD BY AUTOMATED COUNT: 14.4 % (ref 11.6–15.1)
GFR SERPL CREATININE-BSD FRML MDRD: 91 ML/MIN/1.73SQ M
GLUCOSE SERPL-MCNC: 86 MG/DL (ref 65–140)
HCT VFR BLD AUTO: 35.8 % (ref 34.8–46.1)
HGB BLD-MCNC: 11.3 G/DL (ref 11.5–15.4)
IMM GRANULOCYTES # BLD AUTO: 0 THOUSAND/UL (ref 0–0.2)
IMM GRANULOCYTES NFR BLD AUTO: 0 % (ref 0–2)
LYMPHOCYTES # BLD AUTO: 0.78 THOUSANDS/ÂΜL (ref 0.6–4.47)
LYMPHOCYTES NFR BLD AUTO: 18 % (ref 14–44)
MCH RBC QN AUTO: 30.5 PG (ref 26.8–34.3)
MCHC RBC AUTO-ENTMCNC: 31.6 G/DL (ref 31.4–37.4)
MCV RBC AUTO: 97 FL (ref 82–98)
MONOCYTES # BLD AUTO: 0.45 THOUSAND/ÂΜL (ref 0.17–1.22)
MONOCYTES NFR BLD AUTO: 11 % (ref 4–12)
NEUTROPHILS # BLD AUTO: 3.03 THOUSANDS/ÂΜL (ref 1.85–7.62)
NEUTS SEG NFR BLD AUTO: 70 % (ref 43–75)
NRBC BLD AUTO-RTO: 0 /100 WBCS
PLATELET # BLD AUTO: 232 THOUSANDS/UL (ref 149–390)
PMV BLD AUTO: 8.9 FL (ref 8.9–12.7)
POTASSIUM SERPL-SCNC: 5.1 MMOL/L (ref 3.5–5.3)
RBC # BLD AUTO: 3.7 MILLION/UL (ref 3.81–5.12)
SODIUM SERPL-SCNC: 137 MMOL/L (ref 135–147)
WBC # BLD AUTO: 4.29 THOUSAND/UL (ref 4.31–10.16)

## 2025-04-08 PROCEDURE — 36415 COLL VENOUS BLD VENIPUNCTURE: CPT

## 2025-04-08 PROCEDURE — 80048 BASIC METABOLIC PNL TOTAL CA: CPT

## 2025-04-08 PROCEDURE — 99214 OFFICE O/P EST MOD 30 MIN: CPT | Performed by: FAMILY MEDICINE

## 2025-04-08 PROCEDURE — 93000 ELECTROCARDIOGRAM COMPLETE: CPT | Performed by: FAMILY MEDICINE

## 2025-04-08 PROCEDURE — G2211 COMPLEX E/M VISIT ADD ON: HCPCS | Performed by: FAMILY MEDICINE

## 2025-04-08 PROCEDURE — 85025 COMPLETE CBC W/AUTO DIFF WBC: CPT

## 2025-04-08 RX ORDER — ESTRADIOL 0.1 MG/G
CREAM VAGINAL
COMMUNITY
Start: 2025-02-19

## 2025-04-08 RX ORDER — MELOXICAM 15 MG/1
15 TABLET ORAL DAILY
Qty: 90 TABLET | Refills: 1 | Status: SHIPPED | OUTPATIENT
Start: 2025-04-08

## 2025-04-08 NOTE — PROGRESS NOTES
Pre-operative Clearance  Name: Jessica Santamaria      : 1955      MRN: [id number]  Encounter Provider: Patricia Dos Santos DO  Encounter Date: 2025   Encounter department: [facility]    :  Assessment & Plan  Cystocele with prolapse         Uterovaginal prolapse, incomplete  Patient notes from GYN and also from Dr Leroy were reviewed Patient will have surgery for correction Patient ECG was reviewed and normal Patient to have CBC and CMP labs done Patient to see me as scheduled for routine followup       Preop general physical exam  Patient clearance is cleared for surgery She is mildly anemic and also has a slightly low white blood cell count However these are stable and likely related to the sjorgren;s syndrome  Orders:    POCT ECG    Midline cystocele  For repair by Dr Leroy       Sjogren's syndrome with lung involvement (HCC)  Followup as scheduled with the rheumatology continue cellcept  Breathing is stable Patient uses the albuterol as needed       ILD (interstitial lung disease) (HCC)  Continue as needed albuterol        Gastroesophageal reflux disease without esophagitis  Stable on prilosec continue that and followup as scheduled           Pre-operative Clearance:     Revised Cardiac Risk Index:  RCI RISK CLASS I (0 risk factors, risk of major cardiac complications approximately 0.5%)    Clearance:  Patient is medically optimized (CLEARED) for proposed surgery without any additional cardiac testing.       Patient will hold the mobic 7 days prior to surgery     Medication Instructions:   - Avoid herbs or non-directed vitamins one week prior to surgery    - Avoid aspirin containing medications or non-steroidal anti-inflammatory drugs one week preceding surgery    - May take tylenol for pain up until the night before surgery    - 5HT3 Antagonist (ie, zofran):  Continue to take this medication on your normal schedule.   - H2 Blocker: Continue to take this medication on your  normal schedule.  - Hormone replacement therapy: Continue to take this medication on your normal schedule. This medication may need to be discontinued for at least 4 weeks prior to surgery if the surgical procedure is associated with high risk of blood clots. Consult with your surgeon.       History of Present Illness     Pre-Op Examination     Surgery: repair of cystocele usinge extraperitoneal colpopexy using enplace combined with anterior/posterior colporrhaphy with pubovaginal sling   Anticipated Date of Surgery: May 5, 2025   Surgeon: Dr Reid Leroy     Patient is here for preoperative clearance for uterine prolapse and also repair of urethral hypermobility Patient noticed this a few months ago She has had PAP and endometrial biopsy and those were negative for cancer patient saw Dr Leroy regarding this and they decided on this procedure Per Dr Leroy and patient it will be done under IV sedation      Previous history of bleeding disorders or clots?: No    Previous Anesthesia reaction?: Yes    Prolonged steroid use in the last 6 months?: No      Assessment of Cardiac Risk:   - Unstable or severe angina or MI in the last 6 weeks or history of stent placement in the last year?: No    - Decompensated heart failure (e.g. New onset heart failure, NYHA  Class IV heart failure, or worsening existing heart failure)?: No    - Significant arrhythmias such as high grade AV block, symptomatic ventricular arrhythmia, newly recognized ventricular tachycardia, supraventricular tachycardia with resting heart rate >100, or symptomatic bradycardia?: No    - Severe heart valve disease including aortic stenosis or symptomatic mitral stenosis?: No      Pre-operative Risk Factors:  - Elevated-risk surgery: No    - History of cerebrovascular disease: No    - History of ischemic heart disease: No    - History of congestive heart failure: No    - Pre-operative treatment with insulin: No    - Pre-operative creatinine >2 mg/dL: No       Danielson Activity Status Index (DASI):    - DASI Total Score: 28.7   - METs: 6.3     Review of Systems  Past Medical History   Past Medical History:   Diagnosis Date    Allergic     Allergic rhinitis     [id number]  RESOLVED    Candidiasis of breast     2016 RESOLVED    Diverticulosis     Hemorrhoids     Lung nodule     Pneumonia     PONV (postoperative nausea and vomiting)     Psoriatic arthritis (HCC)     Sjogren's syndrome (HCC)     Followed by rheumatology - Dr. Rocha     Past Surgical History:   Procedure Laterality Date    BREAST LUMPECTOMY Left     COLONOSCOPY      MD BRNCHSC INCL FLUOR GDNCE DX W/CELL WASHG SPX N/A 2017    Procedure: BRONCHOSCOPY FLEXIBLE;  Surgeon: Madan Blake MD;  Location: [facility];  Service: Pulmonary    MD COLONOSCOPY FLX DX W/COLLJ SPEC WHEN PFRMD N/A 2016    Procedure: COLONOSCOPY;  Surgeon: Theresa Narvaez MD;  Location: [facility];  Service: General    UMBILICAL HERNIA REPAIR       Family History   Problem Relation Age of Onset    Hypertension Mother     Hypothyroidism Mother     Breast cancer Sister 42    Arthritis Family      Social History     Tobacco Use    Smoking status: Former     Current packs/day: 0.00     Average packs/day: 0.5 packs/day for 15.0 years (7.5 ttl pk-yrs)     Types: Cigarettes     Start date:      Quit date:      Years since quittin.2    Smokeless tobacco: Never    Tobacco comments:     DENIED HISTORY OF TOBACCO USE   Vaping Use    Vaping status: Never Used   Substance and Sexual Activity    Alcohol use: No    Drug use: No    Sexual activity: Not Currently     Partners: Male     Birth control/protection: Post-menopausal     Current Outpatient Medications on File Prior to Visit   Medication Sig    albuterol (PROVENTIL HFA,VENTOLIN HFA) 90 mcg/act inhaler INHALE 2 PUFFS EVERY 6 HOURS AS NEEDED FOR WHEEZING    ammonium lactate (LAC-HYDRIN) 12 % cream APPLY TO FEET TWICE DAILY FOR DRY SKIN    desloratadine (CLARINEX) 5 MG tablet  Take 1 tablet (5 mg total) by mouth daily    estradiol (ESTRACE) 0.1 mg/g vaginal cream 1/2 gram Vaginal 3x weekly for 90 days    famotidine (PEPCID) 20 mg tablet TAKE 1 TABLET BY MOUTH TWICE A DAY    meloxicam (MOBIC) 15 mg tablet Take 15 mg by mouth daily    Multiple Minerals-Vitamins (CALCIUM-MAGNESIUM-ZINC-D3) TABS Take by mouth daily    Multiple Vitamins-Minerals (MULTIVITAMIN WITH MINERALS) tablet Take 1 tablet by mouth daily    mycophenolate (CELLCEPT) 500 mg tablet TAKE THREE TABLETS BY MOUTH EVERY DAY IN THE MORNING AND TAKE TWO TABLETS BY MOUTH EVERY DAY AT BEDTIME    omeprazole (PriLOSEC) 40 MG capsule Take 1 capsule (40 mg total) by mouth daily    ondansetron (ZOFRAN) 4 mg tablet Take 1 tablet (4 mg total) by mouth every 8 (eight) hours as needed for vomiting or nausea for up to 7 doses    Specialty Vitamins Products (MAGNESIUM, AMINO ACID CHELATE,) 133 MG tablet Take 133 mg by mouth    triamcinolone (KENALOG) 0.1 % oral topical paste Apply 1 application topically 2 (two) times a day    Biotin 1 MG CAPS Take by mouth (Patient not taking: Reported on 2/28/2025)    hyoscyamine (ANASPAZ,LEVSIN) 0.125 MG tablet Take 1 tablet (0.125 mg total) by mouth every 4 (four) hours as needed for cramping (Patient not taking: Reported on 1/8/2025)    ketoconazole (NIZORAL) 2 % cream APPLY TWICE DAILY TO FOOT FOR 6 WEEKS (Patient not taking: Reported on 4/8/2025)    montelukast (SINGULAIR) 10 mg tablet TAKE 1 TABLET BY MOUTH EVERYDAY AT BEDTIME (Patient not taking: Reported on 4/8/2025)    omega-3-acid ethyl esters (LOVAZA) 1 g capsule Take 2 g by mouth 2 (two) times a day (Patient not taking: Reported on 1/8/2025)    promethazine-dextromethorphan (PHENERGAN-DM) 6.25-15 mg/5 mL oral syrup Take 5 mL by mouth daily at bedtime as needed for cough (Patient not taking: Reported on 1/8/2025)     Allergies   Allergen Reactions    Egg Yolk - Food Allergy Diarrhea     Egg Extract    Aspirin GI Intolerance     nausea    Sulfa  "Antibiotics Rash     Objective   /70   Pulse 73   Temp (!) 97.2 °F (36.2 °C) (Temporal)   Resp 16   Ht 5' 4\" (1.626 m)   Wt 89.8 kg (198 lb)   SpO2 98%   BMI 33.99 kg/m²     Physical Exam  Constitutional:       Appearance: She is well-developed. She is obese.   HENT:      Head: Normocephalic and atraumatic.      Right Ear: Hearing, tympanic membrane and external ear normal.      Left Ear: Hearing, tympanic membrane and external ear normal.   Eyes:      Extraocular Movements: Extraocular movements intact.      Conjunctiva/sclera: Conjunctivae normal.      Pupils: Pupils are equal, round, and reactive to light.   Neck:      Thyroid: No thyromegaly.   Cardiovascular:      Rate and Rhythm: Normal rate and regular rhythm.      Heart sounds: Normal heart sounds.   Pulmonary:      Effort: Pulmonary effort is normal.      Breath sounds: Normal breath sounds. No wheezing or rales.   Abdominal:      General: Bowel sounds are normal. There is no distension.      Palpations: Abdomen is soft.      Tenderness: There is no abdominal tenderness.   Musculoskeletal:         General: No tenderness.      Cervical back: Neck supple.   Lymphadenopathy:      Cervical: No cervical adenopathy.   Skin:     General: Skin is warm and dry.      Findings: No rash.   Neurological:      General: No focal deficit present.      Mental Status: She is alert and oriented to person, place, and time.      Cranial Nerves: No cranial nerve deficit.      Coordination: Coordination normal.   Psychiatric:         Mood and Affect: Mood normal.         Behavior: Behavior normal.         Thought Content: Thought content normal.         Judgment: Judgment normal.           Patricia Dos Santos, DO  "

## 2025-04-08 NOTE — TELEPHONE ENCOUNTER
Pt in for a Pre-Op appt on 4/8/25; scheduled for surgery with Dr. Leroy on 5/5/25 for prolapse surgery. PCP needs the notes on the pt from Dr. Leroy.    Luis Felipe castillo/ [facility] @ [phone] to please call the office back or fax notes to # [phone].

## 2025-04-08 NOTE — ASSESSMENT & PLAN NOTE
Followup as scheduled with the rheumatology continue cellcept  Breathing is stable Patient uses the albuterol as needed

## 2025-04-08 NOTE — ASSESSMENT & PLAN NOTE
Patient notes from GYN and also from Dr Leroy were reviewed Patient will have surgery for correction Patient ECG was reviewed and normal Patient to have CBC and CMP labs done Patient to see me as scheduled for routine followup

## 2025-04-08 NOTE — ASSESSMENT & PLAN NOTE
Patient clearance is cleared for surgery She is mildly anemic and also has a slightly low white blood cell count However these are stable and likely related to the sjorgren;s syndrome  Orders:    POCT ECG

## 2025-04-30 ENCOUNTER — ANESTHESIA EVENT (OUTPATIENT)
Dept: PERIOP | Facility: HOSPITAL | Age: 70
End: 2025-04-30
Payer: COMMERCIAL

## 2025-04-30 NOTE — PRE-PROCEDURE INSTRUCTIONS
Pre-Surgery Instructions:   Medication Instructions    albuterol (PROVENTIL HFA,VENTOLIN HFA) 90 mcg/act inhaler Uses PRN- OK to take day of surgery    desloratadine (CLARINEX) 5 MG tablet Hold day of surgery.    famotidine (PEPCID) 20 mg tablet Take day of surgery.    meloxicam (MOBIC) 15 mg tablet Stop taking 3 days prior to surgery.    Multiple Minerals-Vitamins (CALCIUM-MAGNESIUM-ZINC-D3) TABS Stop taking 7 days prior to surgery.    Multiple Vitamins-Minerals (MULTIVITAMIN WITH MINERALS) tablet Stop taking 7 days prior to surgery.    omeprazole (PriLOSEC) 40 MG capsule Take day of surgery.    ondansetron (ZOFRAN) 4 mg tablet Uses PRN- OK to take day of surgery    Specialty Vitamins Products (MAGNESIUM, AMINO ACID CHELATE,) 133 MG tablet Stop taking 7 days prior to surgery.     Medication instructions for day of surgery reviewed. Please take all instructed medications with only a sip of water.       You will receive a call one business day prior to surgery with an arrival time and hospital directions. If your surgery is scheduled on a Monday, the hospital will be calling you on the Friday prior to your surgery. If you have not heard from anyone by 8pm, please call the hospital supervisor through the hospital  at [phone]. ([place] [phone] or [place] [phone]).    Do not eat or drink anything after midnight the night before your surgery, including candy, mints, lifesavers, or chewing gum. Do not drink alcohol 24hrs before your surgery. Try not to smoke at least 24hrs before your surgery.       Follow the pre surgery showering instructions as listed in the “My Surgical Experience Booklet” or otherwise provided by your surgeon's office. Do not use a blade to shave the surgical area 1 week before surgery. It is okay to use a clean electric clippers up to 24 hours before surgery. Do not apply any lotions, creams, including makeup, cologne, deodorant, or perfumes after showering on the day of  your surgery. Do not use dry shampoo, hair spray, hair gel, or any type of hair products.     No contact lenses, eye make-up, or artificial eyelashes. Remove nail polish, including gel polish, and any artificial, gel, or acrylic nails if possible. Remove all jewelry including rings and body piercing jewelry.     Wear causal clothing that is easy to take on and off. Consider your type of surgery.    Keep any valuables, jewelry, piercings at home. Please bring any specially ordered equipment (sling, braces) if indicated.    Arrange for a responsible person to drive you to and from the hospital on the day of your surgery. Please confirm the visitor policy for the day of your procedure when you receive your phone call with an arrival time.     Call the surgeon's office with any new illnesses, exposures, or additional questions prior to surgery.    Please reference your “My Surgical Experience Booklet” for additional information to prepare for your upcoming surgery.

## 2025-05-04 PROBLEM — N81.9 PROLAPSE OF FEMALE GENITAL ORGANS: Status: RESOLVED | Noted: 2025-04-08 | Resolved: 2025-05-04

## 2025-05-04 NOTE — DISCHARGE INSTR - AVS FIRST PAGE
Post-Urogynecologic Surgery Discharge Instructions:  1. Nothing in the vagina for six weeks  2. You may take stairs one at a time, touching each step with both feet for the first few days, then as tolerated.  3. Call the office for fever greater than 100.4'F, heavy vaginal bleeding, or increasing pain.  4. Activity as tolerated.  5. Please remove the vaginal packing in the morning on POD#3, Thursday  6. Please take the following for postoperative bowel regimen: colace 100 mg twice daily, miralax 17g daily, milk of magnesia as needed  7. Do not use topical estrogen until six weeks postoperatively    Post Operative Pain Management:  If you have cramping or mild pain you may take 600 mg Ibuprofen every 6 hours to relieve.     If you continue to have residual mild pain not entirely relieved by Ibuprofen then you may take 650 mg of tylenol every 6 hours.       If you have any questions regarding your prescriptions please call your doctor.    Apical Vaginal Repair (EnPlace System)    WHAT YOU NEED TO KNOW:   An apical vaginal repair is a procedure to lift the cervix, vagina  and surrounding structures to the normal anatomical position. This can help prevent you from having a bulge sensation in the vagina. The procedure is also called an EnPlace procedure.       **** VAGINAL PACKING WAS PLACED IN THE VAGINA- This packing will need to be removed at home on POD #3 ****      DISCHARGE INSTRUCTIONS:   Medicines:   Pain medicine:    You may need medicine to take away or decrease pain.     Learn how to take your medicine. Ask what medicine and how much you should take. Be sure you know how, when, and how often to take it.    Do not wait until the pain is severe before you take your medicine. Tell caregivers if your pain does not decrease.    Pain medicine can make you dizzy or sleepy. Prevent falls by calling someone when you get out of bed or if you need help.    Antibiotics:  This medicine is given to fight or prevent an  infection caused by bacteria. Always take your antibiotics exactly as ordered by your healthcare provider. Do not stop taking your medicine unless directed by your healthcare provider. Never save antibiotics or take leftover antibiotics that were given to you for another illness.    Take your medicine as directed.  Contact your healthcare provider if you think your medicine is not helping or if you have side effects. Tell him or her if you are allergic to any medicine. Keep a list of the medicines, vitamins, and herbs you take. Include the amounts, and when and why you take them. Bring the list or the pill bottles to follow-up visits. Carry your medicine list with you in case of an emergency.  Follow up with your healthcare provider as directed:  Write down your questions so you remember to ask them during your visits.   Self-care:   Vaginal packing: Vaginal packing was placed into the vagina. You will remove this packing on POD #3 early in the morning. (This is the 3rd day after your surgery). The office will call to remind you to remove this packing.     Sex:  Do not have sex until your healthcare provider says it is okay.    Kegel exercises:  To do kegel exercises, squeeze your pelvic floor muscles for 5 to 10 seconds, then release. Regular kegel exercises will help your pelvic floor muscles become stronger. This will help prevent you from leaking urine. Ask your healthcare provider when to start these exercises and how often to do them.    Sanitary pad:  Change your sanitary pad regularly. Keep track of how often you change the pad.    Orellana catheter:  Keep the bag below your waist. This will help prevent infection and other problems caused by urine flowing back into your bladder. Do not pull on the catheter because this can cause pain and bleeding, and the catheter could come out. Keep the catheter tubing free of kinks so your urine will flow into the bag. Your healthcare provider will remove the catheter as  soon as possible, to help prevent infection.    Wound care:  When you are allowed to bathe or shower, carefully wash your vaginal area with soap and water.     Do not put pressure on your abdomen:  This will help prevent damage to your surgery area. Do not strain, lift heavy objects, or stand for a very long time. Do not perform strenuous exercises, such as running and weight lifting.    Activity:  You may need to start walking within a few days after your procedure. Ask your healthcare provider when to start and how long you should walk. Ask about any other exercises that may be right for you.    Support socks:  You may need to wear support socks. These are tight socks that help increase the circulation in your legs until you are more active. This helps prevent blood clots.  Contact your healthcare provider if:   You soak a sanitary pad with blood every hour for 4 hours.    You have vaginal pain that does not go away even after you take pain medicine.    You have pus or a foul-smelling discharge from your genital area.     You see blood in your urine.    You have pain during sex.    You have a fever, chills, a cough, or feel weak and achy.    You have nausea and vomiting.    You have questions or concerns about your condition or care.  Seek care immediately or call 911 if:   You feel something is bulging out into your vagina or rectum and not going back in.    You cannot urinate.    Your arm or leg feels warm, tender, and painful. It may look swollen and red.    You suddenly feel lightheaded and short of breath.    You have chest pain. You may have more pain when you take a deep breath or cough. You may cough up blood.  © 2017 [employer] Information is for End User's use only and may not be sold, redistributed or otherwise used for commercial purposes. All illustrations and images included in CareNotes® are the copyrighted property of [employer].D[employer]M., Inc[employer] or [employer].  The above  information is an  only. It is not intended as medical advice for individual conditions or treatments. Talk to your doctor, nurse or pharmacist before following any medical regimen to see if it is safe and effective for you.

## 2025-05-05 ENCOUNTER — ANESTHESIA (OUTPATIENT)
Dept: PERIOP | Facility: HOSPITAL | Age: 70
End: 2025-05-05
Payer: COMMERCIAL

## 2025-05-05 ENCOUNTER — HOSPITAL ENCOUNTER (OUTPATIENT)
Facility: HOSPITAL | Age: 70
Setting detail: OUTPATIENT SURGERY
Discharge: HOME | End: 2025-05-05
Attending: OBSTETRICS & GYNECOLOGY | Admitting: OBSTETRICS & GYNECOLOGY
Payer: COMMERCIAL

## 2025-05-05 VITALS
SYSTOLIC BLOOD PRESSURE: 105 MMHG | RESPIRATION RATE: 16 BRPM | DIASTOLIC BLOOD PRESSURE: 60 MMHG | WEIGHT: 188.71 LBS | HEIGHT: 64 IN | BODY MASS INDEX: 32.22 KG/M2 | TEMPERATURE: 97.2 F | HEART RATE: 75 BPM | OXYGEN SATURATION: 100 %

## 2025-05-05 DIAGNOSIS — N81.6 RECTOCELE: ICD-10-CM

## 2025-05-05 DIAGNOSIS — N81.2 INCOMPLETE UTEROVAGINAL PROLAPSE: ICD-10-CM

## 2025-05-05 DIAGNOSIS — N81.89 OTHER FEMALE GENITAL PROLAPSE: ICD-10-CM

## 2025-05-05 DIAGNOSIS — N81.11 CYSTOCELE, MIDLINE: ICD-10-CM

## 2025-05-05 DIAGNOSIS — N36.41 HYPERMOBILITY OF URETHRA: ICD-10-CM

## 2025-05-05 PROCEDURE — 87086 URINE CULTURE/COLONY COUNT: CPT | Performed by: OBSTETRICS & GYNECOLOGY

## 2025-05-05 PROCEDURE — C1771 REP DEV, URINARY, W/SLING: HCPCS | Performed by: OBSTETRICS & GYNECOLOGY

## 2025-05-05 PROCEDURE — 87077 CULTURE AEROBIC IDENTIFY: CPT | Performed by: OBSTETRICS & GYNECOLOGY

## 2025-05-05 PROCEDURE — 87186 SC STD MICRODIL/AGAR DIL: CPT | Performed by: OBSTETRICS & GYNECOLOGY

## 2025-05-05 PROCEDURE — 51798 US URINE CAPACITY MEASURE: CPT | Performed by: OBSTETRICS & GYNECOLOGY

## 2025-05-05 DEVICE — SINGLE INCISION SLING SYSTEM
Type: IMPLANTABLE DEVICE | Site: URETHRA | Status: FUNCTIONAL
Brand: ALTIS

## 2025-05-05 RX ORDER — DIPHENHYDRAMINE HYDROCHLORIDE 50 MG/ML
INJECTION, SOLUTION INTRAMUSCULAR; INTRAVENOUS AS NEEDED
Status: DISCONTINUED | OUTPATIENT
Start: 2025-05-05 | End: 2025-05-05

## 2025-05-05 RX ORDER — SODIUM CHLORIDE, SODIUM LACTATE, POTASSIUM CHLORIDE, CALCIUM CHLORIDE 600; 310; 30; 20 MG/100ML; MG/100ML; MG/100ML; MG/100ML
125 INJECTION, SOLUTION INTRAVENOUS CONTINUOUS
Status: DISCONTINUED | OUTPATIENT
Start: 2025-05-05 | End: 2025-05-05 | Stop reason: HOSPADM

## 2025-05-05 RX ORDER — ACETAMINOPHEN 325 MG/1
975 TABLET ORAL ONCE
Status: COMPLETED | OUTPATIENT
Start: 2025-05-05 | End: 2025-05-05

## 2025-05-05 RX ORDER — SODIUM CHLORIDE, SODIUM LACTATE, POTASSIUM CHLORIDE, CALCIUM CHLORIDE 600; 310; 30; 20 MG/100ML; MG/100ML; MG/100ML; MG/100ML
20 INJECTION, SOLUTION INTRAVENOUS CONTINUOUS
Status: DISCONTINUED | OUTPATIENT
Start: 2025-05-05 | End: 2025-05-05 | Stop reason: HOSPADM

## 2025-05-05 RX ORDER — GLYCOPYRROLATE 0.2 MG/ML
INJECTION INTRAMUSCULAR; INTRAVENOUS AS NEEDED
Status: DISCONTINUED | OUTPATIENT
Start: 2025-05-05 | End: 2025-05-05

## 2025-05-05 RX ORDER — KETOROLAC TROMETHAMINE 30 MG/ML
INJECTION, SOLUTION INTRAMUSCULAR; INTRAVENOUS AS NEEDED
Status: DISCONTINUED | OUTPATIENT
Start: 2025-05-05 | End: 2025-05-05

## 2025-05-05 RX ORDER — CEFAZOLIN SODIUM 2 G/50ML
2000 SOLUTION INTRAVENOUS ONCE
Status: COMPLETED | OUTPATIENT
Start: 2025-05-05 | End: 2025-05-05

## 2025-05-05 RX ORDER — ONDANSETRON 2 MG/ML
4 INJECTION INTRAMUSCULAR; INTRAVENOUS EVERY 6 HOURS PRN
Status: DISCONTINUED | OUTPATIENT
Start: 2025-05-05 | End: 2025-05-05 | Stop reason: HOSPADM

## 2025-05-05 RX ORDER — FENTANYL CITRATE/PF 50 MCG/ML
25 SYRINGE (ML) INJECTION
Status: DISCONTINUED | OUTPATIENT
Start: 2025-05-05 | End: 2025-05-05 | Stop reason: HOSPADM

## 2025-05-05 RX ORDER — FENTANYL CITRATE 50 UG/ML
INJECTION, SOLUTION INTRAMUSCULAR; INTRAVENOUS AS NEEDED
Status: DISCONTINUED | OUTPATIENT
Start: 2025-05-05 | End: 2025-05-05

## 2025-05-05 RX ORDER — EPHEDRINE SULFATE 50 MG/ML
INJECTION INTRAVENOUS AS NEEDED
Status: DISCONTINUED | OUTPATIENT
Start: 2025-05-05 | End: 2025-05-05

## 2025-05-05 RX ORDER — ONDANSETRON 2 MG/ML
4 INJECTION INTRAMUSCULAR; INTRAVENOUS ONCE AS NEEDED
Status: DISCONTINUED | OUTPATIENT
Start: 2025-05-05 | End: 2025-05-05 | Stop reason: HOSPADM

## 2025-05-05 RX ORDER — PROPOFOL 10 MG/ML
INJECTION, EMULSION INTRAVENOUS AS NEEDED
Status: DISCONTINUED | OUTPATIENT
Start: 2025-05-05 | End: 2025-05-05

## 2025-05-05 RX ORDER — HYDROMORPHONE HCL/PF 1 MG/ML
0.5 SYRINGE (ML) INJECTION
Status: DISCONTINUED | OUTPATIENT
Start: 2025-05-05 | End: 2025-05-05 | Stop reason: HOSPADM

## 2025-05-05 RX ORDER — PROPOFOL 10 MG/ML
INJECTION, EMULSION INTRAVENOUS CONTINUOUS PRN
Status: DISCONTINUED | OUTPATIENT
Start: 2025-05-05 | End: 2025-05-05

## 2025-05-05 RX ORDER — ONDANSETRON 2 MG/ML
INJECTION INTRAMUSCULAR; INTRAVENOUS AS NEEDED
Status: DISCONTINUED | OUTPATIENT
Start: 2025-05-05 | End: 2025-05-05

## 2025-05-05 RX ORDER — MIDAZOLAM HYDROCHLORIDE 2 MG/2ML
INJECTION, SOLUTION INTRAMUSCULAR; INTRAVENOUS AS NEEDED
Status: DISCONTINUED | OUTPATIENT
Start: 2025-05-05 | End: 2025-05-05

## 2025-05-05 RX ORDER — DEXMEDETOMIDINE HYDROCHLORIDE 100 UG/ML
INJECTION, SOLUTION INTRAVENOUS AS NEEDED
Status: DISCONTINUED | OUTPATIENT
Start: 2025-05-05 | End: 2025-05-05

## 2025-05-05 RX ORDER — ACETAMINOPHEN 325 MG/1
975 TABLET ORAL EVERY 6 HOURS PRN
Status: DISCONTINUED | OUTPATIENT
Start: 2025-05-05 | End: 2025-05-05 | Stop reason: HOSPADM

## 2025-05-05 RX ORDER — FUROSEMIDE 10 MG/ML
INJECTION INTRAMUSCULAR; INTRAVENOUS AS NEEDED
Status: DISCONTINUED | OUTPATIENT
Start: 2025-05-05 | End: 2025-05-05

## 2025-05-05 RX ORDER — IBUPROFEN 600 MG/1
600 TABLET, FILM COATED ORAL EVERY 6 HOURS PRN
Status: DISCONTINUED | OUTPATIENT
Start: 2025-05-05 | End: 2025-05-05 | Stop reason: HOSPADM

## 2025-05-05 RX ORDER — MAGNESIUM HYDROXIDE 1200 MG/15ML
LIQUID ORAL AS NEEDED
Status: DISCONTINUED | OUTPATIENT
Start: 2025-05-05 | End: 2025-05-05 | Stop reason: HOSPADM

## 2025-05-05 RX ADMIN — PROPOFOL 40 MG: 10 INJECTION, EMULSION INTRAVENOUS at 08:46

## 2025-05-05 RX ADMIN — DEXMEDETOMIDINE HYDROCHLORIDE 12 MCG: 100 INJECTION, SOLUTION INTRAVENOUS at 07:42

## 2025-05-05 RX ADMIN — CEFAZOLIN SODIUM 2000 MG: 2 SOLUTION INTRAVENOUS at 07:44

## 2025-05-05 RX ADMIN — ONDANSETRON 4 MG: 2 INJECTION INTRAMUSCULAR; INTRAVENOUS at 07:36

## 2025-05-05 RX ADMIN — PROPOFOL 80 MG: 10 INJECTION, EMULSION INTRAVENOUS at 09:00

## 2025-05-05 RX ADMIN — FENTANYL CITRATE 50 MCG: 50 INJECTION INTRAMUSCULAR; INTRAVENOUS at 07:42

## 2025-05-05 RX ADMIN — PROPOFOL 80 MCG/KG/MIN: 10 INJECTION, EMULSION INTRAVENOUS at 07:42

## 2025-05-05 RX ADMIN — PROPOFOL 50 MG: 10 INJECTION, EMULSION INTRAVENOUS at 07:42

## 2025-05-05 RX ADMIN — ACETAMINOPHEN 975 MG: 325 TABLET, FILM COATED ORAL at 06:22

## 2025-05-05 RX ADMIN — EPHEDRINE SULFATE 5 MG: 50 INJECTION INTRAVENOUS at 08:27

## 2025-05-05 RX ADMIN — EPHEDRINE SULFATE 10 MG: 50 INJECTION INTRAVENOUS at 08:30

## 2025-05-05 RX ADMIN — SODIUM CHLORIDE, SODIUM LACTATE, POTASSIUM CHLORIDE, AND CALCIUM CHLORIDE 125 ML/HR: .6; .31; .03; .02 INJECTION, SOLUTION INTRAVENOUS at 06:38

## 2025-05-05 RX ADMIN — DIPHENHYDRAMINE HYDROCHLORIDE 12.5 MG: 50 INJECTION, SOLUTION INTRAMUSCULAR; INTRAVENOUS at 07:47

## 2025-05-05 RX ADMIN — SODIUM CHLORIDE, SODIUM LACTATE, POTASSIUM CHLORIDE, AND CALCIUM CHLORIDE: .6; .31; .03; .02 INJECTION, SOLUTION INTRAVENOUS at 09:06

## 2025-05-05 RX ADMIN — MIDAZOLAM 2 MG: 1 INJECTION INTRAMUSCULAR; INTRAVENOUS at 07:35

## 2025-05-05 RX ADMIN — DEXMEDETOMIDINE HYDROCHLORIDE 12 MCG: 100 INJECTION, SOLUTION INTRAVENOUS at 07:47

## 2025-05-05 RX ADMIN — GLYCOPYRROLATE 0.1 MG: 0.2 INJECTION, SOLUTION INTRAMUSCULAR; INTRAVENOUS at 07:35

## 2025-05-05 RX ADMIN — IBUPROFEN 600 MG: 600 TABLET, FILM COATED ORAL at 10:37

## 2025-05-05 RX ADMIN — KETOROLAC TROMETHAMINE 15 MG: 30 INJECTION, SOLUTION INTRAMUSCULAR; INTRAVENOUS at 09:20

## 2025-05-05 RX ADMIN — DEXMEDETOMIDINE HYDROCHLORIDE 8 MCG: 100 INJECTION, SOLUTION INTRAVENOUS at 07:35

## 2025-05-05 RX ADMIN — FENTANYL CITRATE 50 MCG: 50 INJECTION INTRAMUSCULAR; INTRAVENOUS at 07:45

## 2025-05-05 RX ADMIN — FENTANYL CITRATE 100 MCG: 50 INJECTION INTRAMUSCULAR; INTRAVENOUS at 09:11

## 2025-05-05 RX ADMIN — FUROSEMIDE 10 MG: 10 INJECTION, SOLUTION INTRAVENOUS at 08:50

## 2025-05-05 RX ADMIN — PROPOFOL 30 MG: 10 INJECTION, EMULSION INTRAVENOUS at 07:56

## 2025-05-05 NOTE — ANESTHESIA PREPROCEDURE EVALUATION
Procedure:  COLPOSUSPENSION VAGINAL EXTRAPERITONEAL(ENPLACE); EUA (Vagina )  ANTERIOR/POSTERIOR COLPORRHAPHY (Perineum)  PUBOVAGINAL SLING (Vagina )  CYSTOSCOPY (Bladder)    Relevant Problems   GI/HEPATIC   (+) Gastroesophageal reflux disease without esophagitis      MUSCULOSKELETAL   (+) Primary osteoarthritis of both knees      Rheumatology   (+) ILD (interstitial lung disease) (HCC)   (+) Sjogren's syndrome with lung involvement (HCC)      Other   (+) Anti-cardiolipin antibody positive        Physical Exam    Airway    Mallampati score: II  TM Distance: >3 FB       Dental   No notable dental hx     Cardiovascular  Cardiovascular exam normal    Pulmonary  Pulmonary exam normal     Other Findings  post-pubertal.      Anesthesia Plan  ASA Score- 3     Anesthesia Type- IV sedation with anesthesia with ASA Monitors.         Additional Monitors:     Airway Plan:     Comment: Pt has egg allergy but has received propofol in previous anesthetic with no issues.       Plan Factors-    Chart reviewed.        Patient is not a current smoker.      Obstructive sleep apnea risk education given perioperatively.        Induction- intravenous.    Postoperative Plan- Plan for postoperative opioid use.         Informed Consent- Anesthetic plan and risks discussed with patient.        NPO Status:  No vitals data found for the desired time range.

## 2025-05-05 NOTE — OP NOTE
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]    Procedure(s):  COLPOSUSPENSION VAGINAL EXTRAPERITONEAL(ENPLACE); EUA  ANTERIOR/POSTERIOR COLPORRHAPHY  PUBOVAGINAL SLING  CYSTOSCOPY    Specimen(s):  ID Type Source Tests Collected by Time Destination   A :  Urine Urine, Straight Cath URINE CULTURE Reid Leroy MD 2025 [id number]        Estimated Blood Loss:   Minimal    Anesthesia Type:   IV Sedation with Anesthesia    Operative Indications:  Incomplete uterovaginal prolapse [N81.2]  Cystocele, midline [N81.11]  Rectocele [N81.6]  Other female genital prolapse [N81.89]  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 Mild edema of bladder mucosa noted. Bilateral ureteral efflux visualized    Procedure and Technique:  Appropriate preoperative antibiotics chosen per ACOG guidelines were given.  Bilateral SCDs were placed in the lower extremities for DVT prevention prior to the institution of anesthesia.    The patient was identified in the holding area by the operating room staff and attending physician. She was taken to the operating  room where anesthesia was instituted without complications. She was placed in the dorsal lithotomy position with the legs in Ramin stirrups with care taken to avoid excessive flexion or extension of her lower extremities. The patient was prepped and draped in the usual sterile fashion. A Orellana catheter was inserted.    A finger was placed in the lateral vagina, with careful palpation of the ischial spines and sacrospinous ligaments bilaterally.  Location along the sacrospinous ligament approximately in the middle 1/3 of the ligament as well as the lower 1/3 of the ligament was carefully palpated and the spinal needle sheath was placed next to the the surgeon's finger and loaded with a 7 inch 22G spinal needle to inject the trocar site initially with Lidocaine/Epinephrine local totaling 1.5 cc. The Escala Mendt device loaded with the 0 Prolene suture was brought into the field. The device was placed immediately next to the surgeon's index finger and the device was used to place the 0 Prolene suture with anchor at this location while the surgeons finger was firmly pressed against the ligament, approximately 2 cm medial to the ischial spine using the sacrum as a guide.  Once the suture was anchored in place, which was confirmed by tenting of the ligament with gentle tugging, all instruments were removed from the vagina.  Rectal exam confirmed proper location as well. She had the exact same procedure performed on the contralateral side. Next local was injected into the cervico-vaginal junction and a 3 cm anterior vaginal epithelial incision was made along the anterior cervico-vaginal junction, until the cervical stroma was encountered. Next, one strand of the Prolene suture was passed backwards using a large gibson needle through its entering point in the vaginal wall and medially through the cervical stromal tissue. The second strand on the same side was passed caudally and proximal to the cervical os using the same  technique. The exact same procedure was performed on the contralateral side.     Attention was turned to the anterior wall where a persistent cystocele was noted. Two Allis clamps were applied horizontally along the vaginal incision to grasp the dependent portion of the cystocele for traction. The epithelium was further  from the vaginal muscularis sharply with tenotomy and metzenbaum scissors and bluntly with peanut sponges and a raytec. Excess vaginal mucosal skin was trimmed. The vaginal wall was then plicated in a vertical imbricating fashion using 2-0 PDS. We then began closure of the anterior vaginal epithelial incision with a 2-0 Vicryl sutures in a running locked stitch from lateral to medial with two separate sutures from each vaginal apex. The incision was intentionally left open after the first few throws from each side. The prolene sutures were then tied down separately, one at a time until proper apical support was obtained and then they were tied together pushing the prolapse up to the anatomic position of the vaginal apex. The 2-0 PDS stitches were then tied down. We then finished closure of the anterior vaginal epithelial incision with the two 2-0 Vicryl sutures in a running locked stitch from lateral to medial. The incision was inspected and noted to be hemostatic.      We turned our attention for performance of the single incision sling. At this time, the mid urethral zone was identified in reference to the Orellana catheter and urethral meatus and local anesthetic solution was injected into the anterior vaginal wall at the mid urethral level for hydrodissection and vasoconstriction. Next, local was injected at the midline and to the left and right of midline directing the infiltration laterally towards the cephalad aspect of the inferior pubic ramus bilaterally. Care was taken to ensure that the sulcus was flattened and free of infiltration to minimize chance for buttonholing or tapping too  close to the anterolateral sulcus. After completion of hydrodissection, 2 Allis clamps were used to grasp the anterior vaginal wall for traction. A 1.5 cm incision was made to the midline. Two Allis clamps were then placed on each cut edge of the incision for stabilization. Tenotomy scissors were used to create a small vaginal tunnel with sharp and blunt dissection above the anterior vaginal wall directed laterally towards the cephalad aspect of the inferior pubic ramus bilaterally. Dissection was carried out to the edge of the bone itself but no dissection into the obturator internus muscle. Once the dissection was complete, the sling was placed. The Altis system was used. An index finger was placed in the vagina for guidance. The Altis trocar was placed into the pre-dissected tract. The handle was held in horizontal slight upward canting to avoid buttonholing of the sulcus. Cephalad drift was used to allow passage around the inferior pubic ramus. A thumb was placed on the heel of the introducer to allow a push/pivot maneuver to place a fixed anchor into the obturator internus muscle membrane complex on the patient's left side. Proper handle deviation confirmed proper anchor placement, as well as a gentle tugging on the sling. Once the introducer was removed, it also confirmed proper anchor placement. The exact same sequence of steps was repeated on the patient's right side with adjustable anchor. Once it was complete, the introducer was removed and gentle tugging again confirmed proper anchor placement. The Orellana catheter was then used to drain the bladder and then it was removed and a diagnostic cystoscopy was performed by instilling 300 mL of fluid into the bladder. Both ureters were effluxing normally and there were no lacerations or injury in the lower urinary tract. We used Crede maneuver to elicit loss of fluid from the urethral meatus and there was loss of fluid noted. The tensioning suture was used to  adjust the tape until there was minimal to no leakage with Crede. After appropriate tensioning, the tensioning suture was cut and the vaginal incision was closed with 2-0 Vicryl suture in a running locked stitch. At this time, we placed the Orellana back in the bladder.     Attention was then turned to the posterior compartment where 2 Allis clamps were placed in the posterior fourchette over the mucocutaneous border to reduce the markedly relaxed vaginal outlet. Dilute Marcaine solution was injected into the perineum. A jessica-shaped incision was made extending from the vaginal mucosa onto the perineum. The overlying skin was removed en bloc. We then began closure of the posterior colporrhaphy with a 2-0 Vicryl suture in a running locked stitch. We reapproximated the perineal body with a 0-Vicryl interrupted suture. We closed the remainder of the colporrhaphy to the level of the hymen. We closed the perineal skin with 2-0 Vicryl in a subcuticular fashion.    No bladder, ureteral, viscus, or solid organ injury were noted at the end of the procedure. Irrigation was performed. We completed the procedure. Vaginal packing was placed in the vagina. There were no complications.The sponge, needle and instrument count were correct x2. The patient tolerated the procedure well and went to the recovery room in stable condition and she is stable at the moment of this dictation.    Dr. Leroy was present for the entire procedure.     Complications:   None apparent    Patient Disposition:  PACU       SIGNATURE: Roderick Posadas MD  DATE: May 5, 2025  TIME: 9:34 AM

## 2025-05-05 NOTE — ANESTHESIA POSTPROCEDURE EVALUATION
Post-Op Assessment Note    CV Status:  Stable  Pain Score: 0    Pain management: adequate    Multimodal analgesia used between 6 hours prior to anesthesia start to PACU discharge    Mental Status:  Alert and awake   Hydration Status:  Euvolemic   PONV Controlled:  Controlled   Airway Patency:  Patent  Two or more mitigation strategies used for obstructive sleep apnea   Post Op Vitals Reviewed: Yes    No anethesia notable event occurred.    Staff: Anesthesiologist           Last Filed PACU Vitals:  Vitals Value Taken Time   Temp 98    Pulse 98 05/05/25 0929   /57 05/05/25 0928   Resp 12    SpO2 95 % 05/05/25 0929   Vitals shown include unfiled device data.

## 2025-05-07 LAB — BACTERIA UR CULT: ABNORMAL

## (undated) DEVICE — INTENDED FOR TISSUE SEPARATION, AND OTHER PROCEDURES THAT REQUIRE A SHARP SURGICAL BLADE TO PUNCTURE OR CUT.: Brand: BARD-PARKER SAFETY BLADES SIZE 11, STERILE

## (undated) DEVICE — UNDER BUTTOCKS DRAPE W/FLUID CONTROL POUCH: Brand: CONVERTORS

## (undated) DEVICE — SPONGE CHERRY 1/2IN

## (undated) DEVICE — NEEDLE SPINAL 22G X 7IN QUINCKE

## (undated) DEVICE — REM POLYHESIVE ADULT PATIENT RETURN ELECTRODE: Brand: VALLEYLAB

## (undated) DEVICE — SCD SEQUENTIAL COMPRESSION COMFORT SLEEVE MEDIUM KNEE LENGTH: Brand: KENDALL SCD

## (undated) DEVICE — DECANTER: Brand: UNBRANDED

## (undated) DEVICE — IV FLUSH NSS 10ML POSIFLUSH

## (undated) DEVICE — ALLENTOWN DR  LUCENTE S LAP PK: Brand: CARDINAL HEALTH

## (undated) DEVICE — PACKING VAGINAL 2 IN

## (undated) DEVICE — SUT VICRYL 2-0 SH 27 IN UNDYED J417H

## (undated) DEVICE — TUBING SUCTION 5MM X 12 FT

## (undated) DEVICE — NEEDLE HYPO 23G X 1-1/2 IN

## (undated) DEVICE — EXIDINE 4 PCT

## (undated) DEVICE — CAUTERY TIP POLISHER: Brand: DEVON

## (undated) DEVICE — MEDI-VAC YANKAUER SUCTION HANDLE W/BULBOUS AND CONTROL VENT: Brand: CARDINAL HEALTH

## (undated) DEVICE — INTENT TO BE USED WITH SUTURE MATERIAL FOR TISSUE CLOSURE: Brand: RICHARD-ALLAN® NEEDLE 1/2 CIRCLE TAPER

## (undated) DEVICE — CATH FOLEY 18FR 5ML 2 WAY UNCOATED SILICONE

## (undated) DEVICE — BULB SYRINGE, IRRIGATION WITH PROTECTIVE CAP, 60 CC, INDIVIDUALLY WRAPPED: Brand: DOVER

## (undated) DEVICE — SYRINGE 3ML LL

## (undated) DEVICE — ELECTROSURGICAL DEVICE HOLSTER;FOR USE WITH MAXIMUM PEAK VOLTAGE OF 4000 V: Brand: FORCE TRIVERSE

## (undated) DEVICE — SUT PDS II 2-0 CT-2 27 IN Z333H

## (undated) DEVICE — SMOKE EVACUATION TUBING WITH 8 IN INTEGRAL WAND AND SPONGE GUARD: Brand: BUFFALO FILTER

## (undated) DEVICE — GLOVE PI ULTRA TOUCH SZ.8.0

## (undated) DEVICE — PREMIUM DRY TRAY LF: Brand: MEDLINE INDUSTRIES, INC.

## (undated) DEVICE — SUT VICRYL 0 CT-1 36 IN J946H